# Patient Record
Sex: MALE | Race: WHITE | NOT HISPANIC OR LATINO | Employment: STUDENT | URBAN - METROPOLITAN AREA
[De-identification: names, ages, dates, MRNs, and addresses within clinical notes are randomized per-mention and may not be internally consistent; named-entity substitution may affect disease eponyms.]

---
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
## 2022-02-27 ENCOUNTER — OFFICE VISIT (OUTPATIENT)
Dept: URGENT CARE | Facility: CLINIC | Age: 22
End: 2022-02-27
Payer: MEDICAID

## 2022-02-27 VITALS
TEMPERATURE: 98 F | BODY MASS INDEX: 17.5 KG/M2 | OXYGEN SATURATION: 98 % | HEIGHT: 72 IN | RESPIRATION RATE: 18 BRPM | SYSTOLIC BLOOD PRESSURE: 112 MMHG | HEART RATE: 110 BPM | DIASTOLIC BLOOD PRESSURE: 71 MMHG

## 2022-02-27 DIAGNOSIS — J40 BRONCHITIS: Primary | ICD-10-CM

## 2022-02-27 DIAGNOSIS — H65.93 BILATERAL SEROUS OTITIS MEDIA, UNSPECIFIED CHRONICITY: ICD-10-CM

## 2022-02-27 DIAGNOSIS — R05.9 COUGH: ICD-10-CM

## 2022-02-27 DIAGNOSIS — R06.2 WHEEZE: ICD-10-CM

## 2022-02-27 DIAGNOSIS — R11.10 VOMITING, INTRACTABILITY OF VOMITING NOT SPECIFIED, PRESENCE OF NAUSEA NOT SPECIFIED, UNSPECIFIED VOMITING TYPE: ICD-10-CM

## 2022-02-27 DIAGNOSIS — R50.9 FEVER IN ADULT: ICD-10-CM

## 2022-02-27 LAB
CTP QC/QA: YES
SARS-COV-2 RDRP RESP QL NAA+PROBE: NEGATIVE

## 2022-02-27 PROCEDURE — U0002 COVID-19 LAB TEST NON-CDC: HCPCS | Mod: QW,S$GLB,, | Performed by: NURSE PRACTITIONER

## 2022-02-27 PROCEDURE — 71046 XR CHEST PA AND LATERAL: ICD-10-PCS | Mod: FY,S$GLB,, | Performed by: RADIOLOGY

## 2022-02-27 PROCEDURE — 1159F MED LIST DOCD IN RCRD: CPT | Mod: CPTII,S$GLB,, | Performed by: NURSE PRACTITIONER

## 2022-02-27 PROCEDURE — 71046 X-RAY EXAM CHEST 2 VIEWS: CPT | Mod: FY,S$GLB,, | Performed by: RADIOLOGY

## 2022-02-27 PROCEDURE — 3008F BODY MASS INDEX DOCD: CPT | Mod: CPTII,S$GLB,, | Performed by: NURSE PRACTITIONER

## 2022-02-27 PROCEDURE — 1160F RVW MEDS BY RX/DR IN RCRD: CPT | Mod: CPTII,S$GLB,, | Performed by: NURSE PRACTITIONER

## 2022-02-27 PROCEDURE — 3008F PR BODY MASS INDEX (BMI) DOCUMENTED: ICD-10-PCS | Mod: CPTII,S$GLB,, | Performed by: NURSE PRACTITIONER

## 2022-02-27 PROCEDURE — 3074F SYST BP LT 130 MM HG: CPT | Mod: CPTII,S$GLB,, | Performed by: NURSE PRACTITIONER

## 2022-02-27 PROCEDURE — 99203 OFFICE O/P NEW LOW 30 MIN: CPT | Mod: S$GLB,CS,, | Performed by: NURSE PRACTITIONER

## 2022-02-27 PROCEDURE — 3074F PR MOST RECENT SYSTOLIC BLOOD PRESSURE < 130 MM HG: ICD-10-PCS | Mod: CPTII,S$GLB,, | Performed by: NURSE PRACTITIONER

## 2022-02-27 PROCEDURE — 1160F PR REVIEW ALL MEDS BY PRESCRIBER/CLIN PHARMACIST DOCUMENTED: ICD-10-PCS | Mod: CPTII,S$GLB,, | Performed by: NURSE PRACTITIONER

## 2022-02-27 PROCEDURE — 1159F PR MEDICATION LIST DOCUMENTED IN MEDICAL RECORD: ICD-10-PCS | Mod: CPTII,S$GLB,, | Performed by: NURSE PRACTITIONER

## 2022-02-27 PROCEDURE — 99203 PR OFFICE/OUTPT VISIT, NEW, LEVL III, 30-44 MIN: ICD-10-PCS | Mod: S$GLB,CS,, | Performed by: NURSE PRACTITIONER

## 2022-02-27 PROCEDURE — U0002: ICD-10-PCS | Mod: QW,S$GLB,, | Performed by: NURSE PRACTITIONER

## 2022-02-27 PROCEDURE — 3078F DIAST BP <80 MM HG: CPT | Mod: CPTII,S$GLB,, | Performed by: NURSE PRACTITIONER

## 2022-02-27 PROCEDURE — 3078F PR MOST RECENT DIASTOLIC BLOOD PRESSURE < 80 MM HG: ICD-10-PCS | Mod: CPTII,S$GLB,, | Performed by: NURSE PRACTITIONER

## 2022-02-27 RX ORDER — PREDNISONE 20 MG/1
40 TABLET ORAL DAILY
Qty: 10 TABLET | Refills: 0 | Status: SHIPPED | OUTPATIENT
Start: 2022-02-27 | End: 2022-03-04

## 2022-02-27 RX ORDER — IBUPROFEN 400 MG/1
400 TABLET ORAL EVERY 6 HOURS PRN
Qty: 30 TABLET | Refills: 0 | OUTPATIENT
Start: 2022-02-27 | End: 2022-09-07

## 2022-02-27 RX ORDER — BENZONATATE 100 MG/1
100 CAPSULE ORAL 3 TIMES DAILY PRN
Qty: 30 CAPSULE | Refills: 0 | Status: SHIPPED | OUTPATIENT
Start: 2022-02-27 | End: 2022-03-09

## 2022-02-27 RX ORDER — ALBUTEROL SULFATE 90 UG/1
2 AEROSOL, METERED RESPIRATORY (INHALATION) EVERY 4 HOURS PRN
Qty: 18 G | Refills: 0 | Status: SHIPPED | OUTPATIENT
Start: 2022-02-27

## 2022-02-27 RX ORDER — AMOXICILLIN AND CLAVULANATE POTASSIUM 875; 125 MG/1; MG/1
1 TABLET, FILM COATED ORAL EVERY 12 HOURS
Qty: 20 TABLET | Refills: 0 | Status: SHIPPED | OUTPATIENT
Start: 2022-02-27 | End: 2022-03-09

## 2022-02-27 RX ORDER — ONDANSETRON 8 MG/1
8 TABLET, ORALLY DISINTEGRATING ORAL EVERY 8 HOURS PRN
Qty: 12 TABLET | Refills: 0 | Status: SHIPPED | OUTPATIENT
Start: 2022-02-27

## 2022-02-27 NOTE — PROGRESS NOTES
Subjective:       Patient ID: Shekhar Snyder is a 21 y.o. male.    Vitals:  height is 6' (1.829 m). His oral temperature is 98.2 °F (36.8 °C). His blood pressure is 112/71 and his pulse is 110. His respiration is 18 and oxygen saturation is 98%.     Chief Complaint: URI    20 y/o male here with bilateral ear pain, sinus pressure, productive cough, fever (tactile), and chest tightness x7-10 days.  He has also been feeling fatigued with malaise.  He has had some episodes of vomiting at over the last few nights. Reports he woke up on bathroom floor twice, not realizing he fell asleep on the floor.    Pt appears stable now, no apparent distress. Well hydrated and non toxic appearance.     URI   This is a new problem. Episode onset: 5 days ago  Maximum temperature: unmeasured. Associated symptoms include congestion, coughing, ear pain, headaches (10/10), sinus pain and vomiting (after eating). Pertinent negatives include no nausea or sore throat. Treatments tried: dayquil, OTC sinus medication, ibuprofen.       Constitution: Positive for fatigue and fever.   HENT: Positive for ear pain, congestion, sinus pain and sinus pressure. Negative for sore throat.    Respiratory: Positive for cough and sputum production.    Gastrointestinal: Positive for vomiting (after eating). Negative for nausea.   Neurological: Positive for headaches (10/10).       Objective:      Physical Exam   Constitutional: He is oriented to person, place, and time. He appears well-developed. He is cooperative.  Non-toxic appearance. He does not appear ill. No distress.   HENT:   Head: Normocephalic and atraumatic.   Ears:   Right Ear: Hearing, external ear and ear canal normal. Tympanic membrane is bulging (dull).   Left Ear: Hearing, external ear and ear canal normal. Tympanic membrane is bulging (dull).   Nose: No mucosal edema (has nasal congestion), rhinorrhea or nasal deformity. No epistaxis. Right sinus exhibits maxillary sinus tenderness and  frontal sinus tenderness. Left sinus exhibits maxillary sinus tenderness and frontal sinus tenderness.   Mouth/Throat: Uvula is midline and mucous membranes are normal. No trismus in the jaw. Normal dentition. No uvula swelling. Posterior oropharyngeal erythema present. No oropharyngeal exudate or posterior oropharyngeal edema.   Eyes: Conjunctivae and lids are normal. No scleral icterus.   Neck: Trachea normal and phonation normal. Neck supple. No edema present. No erythema present. No neck rigidity present.   Cardiovascular: Normal rate, regular rhythm, normal heart sounds and normal pulses.   Pulmonary/Chest: Effort normal. No accessory muscle usage. No tachypnea and no bradypnea. No respiratory distress. He has no decreased breath sounds. He has wheezes. He has rhonchi.   Abdominal: Normal appearance. Soft. Bowel sounds are increased. flat abdomenThere is no abdominal tenderness. There is no rebound and no guarding.   Musculoskeletal: Normal range of motion.         General: No deformity. Normal range of motion.   Neurological: He is alert and oriented to person, place, and time. He exhibits normal muscle tone. Coordination normal.   Skin: Skin is warm, dry, intact, not diaphoretic and not pale.   Psychiatric: His speech is normal and behavior is normal. Judgment and thought content normal.   Nursing note and vitals reviewed.        Assessment:       1. Bronchitis    2. Wheeze    3. Fever in adult    4. Vomiting, intractability of vomiting not specified, presence of nausea not specified, unspecified vomiting type    5. Cough    6. Bilateral serous otitis media, unspecified chronicity              Results for orders placed or performed in visit on 02/27/22   POCT COVID-19 Rapid Screening   Result Value Ref Range    POC Rapid COVID Negative Negative     Acceptable Yes        XR CHEST PA AND LATERAL    Result Date: 2/27/2022  EXAMINATION: XR CHEST PA AND LATERAL CLINICAL HISTORY: fever, cough,  wheeze; Cough, unspecified TECHNIQUE: PA and lateral views of the chest were performed. COMPARISON: October 2016. FINDINGS: Cardiac silhouette is normal in size.  Lungs are symmetrically expanded.  No evidence of focal consolidative process, pneumothorax, or significant pleural effusion.  No acute osseous abnormality identified.     No acute cardiopulmonary process identified. Electronically signed by: Boone Leger MD Date:    02/27/2022 Time:    17:06  Plan:         Bronchitis  -     POCT COVID-19 Rapid Screening  -     XR CHEST PA AND LATERAL; Future; Expected date: 02/27/2022  -     amoxicillin-clavulanate 875-125mg (AUGMENTIN) 875-125 mg per tablet; Take 1 tablet by mouth every 12 (twelve) hours. for 10 days  Dispense: 20 tablet; Refill: 0  -     benzonatate (TESSALON) 100 MG capsule; Take 1 capsule (100 mg total) by mouth 3 (three) times daily as needed for Cough.  Dispense: 30 capsule; Refill: 0    Wheeze  -     XR CHEST PA AND LATERAL; Future; Expected date: 02/27/2022  -     predniSONE (DELTASONE) 20 MG tablet; Take 2 tablets (40 mg total) by mouth once daily. for 5 days  Dispense: 10 tablet; Refill: 0  -     albuterol (VENTOLIN HFA) 90 mcg/actuation inhaler; Inhale 2 puffs into the lungs every 4 (four) hours as needed for Wheezing or Shortness of Breath.  Dispense: 18 g; Refill: 0    Fever in adult  -     XR CHEST PA AND LATERAL; Future; Expected date: 02/27/2022  -     ibuprofen (ADVIL,MOTRIN) 400 MG tablet; Take 1 tablet (400 mg total) by mouth every 6 (six) hours as needed for Other or Temperature greater than (100.04).  Dispense: 30 tablet; Refill: 0    Vomiting, intractability of vomiting not specified, presence of nausea not specified, unspecified vomiting type  -     ondansetron (ZOFRAN-ODT) 8 MG TbDL; Take 1 tablet (8 mg total) by mouth every 8 (eight) hours as needed (vomiting).  Dispense: 12 tablet; Refill: 0    Cough  -     benzonatate (TESSALON) 100 MG capsule; Take 1 capsule (100 mg total) by  mouth 3 (three) times daily as needed for Cough.  Dispense: 30 capsule; Refill: 0    Bilateral serous otitis media, unspecified chronicity  -     amoxicillin-clavulanate 875-125mg (AUGMENTIN) 875-125 mg per tablet; Take 1 tablet by mouth every 12 (twelve) hours. for 10 days  Dispense: 20 tablet; Refill: 0         Patient Instructions   Oral fluids  Rest  Blow nose often  Cough therapy

## 2022-02-28 NOTE — TELEPHONE ENCOUNTER
----- Message from Bill Taylor MD sent at 6/20/2017  3:53 PM CDT -----  Contact: Pharmacy [facility]  Please call mother and let her know. Thank you  ----- Message -----  From: Cathie Diamond MA  Sent: 6/20/2017   1:19 PM  To: Bill Taylor MD    [phone] [facility]    Focalin extended release. Brand name-- ins will not pay for     Subjective:      Patient ID: Ervin Cyr is a 80 y.o. male    Hyperlipidemia  This is a chronic problem. The current episode started more than 1 year ago. The problem is controlled. Associated symptoms include chest pain. Chest Pain   Pertinent negatives include no fever, vomiting or weakness. His past medical history is significant for hyperlipidemia. Rectal Bleeding   Associated symptoms include chest pain. Pertinent negatives include no fever, no diarrhea, no vomiting and no rash. here for physical    Here with several episodes of left sided chest pain with tingling feeling sensation in that area. Seems to have resolved currently. Noted some sob associated with this at times. .    Also noted some rectal bleeding on occasion after exercising over the last month or so. No rectal pain. No diarrhea. Review of Systems   Constitutional: Negative for chills and fever. Cardiovascular: Positive for chest pain. Gastrointestinal: Positive for hematochezia. Negative for diarrhea and vomiting. Genitourinary: Negative for dysuria. Skin: Negative for rash. Neurological: Negative for weakness.      Reviewed allergy, medical, social, surgical, family and med list changes and updated   Files     Social History     Socioeconomic History    Marital status:      Spouse name: None    Number of children: None    Years of education: None    Highest education level: None   Occupational History    None   Tobacco Use    Smoking status: Former Smoker    Smokeless tobacco: Never Used   Vaping Use    Vaping Use: None   Substance and Sexual Activity    Alcohol use: Not Currently    Drug use: Not Currently    Sexual activity: Yes     Partners: Female   Other Topics Concern    None   Social History Narrative    None     Social Determinants of Health     Financial Resource Strain:     Difficulty of Paying Living Expenses: Not on file   Food Insecurity:     Worried About Running Out of [employer] [place] in the Last Year: Not on file    Ran Out of Food in the Last Year: Not on file   Transportation Needs:     Lack of Transportation (Medical): Not on file    Lack of Transportation (Non-Medical): Not on file   Physical Activity:     Days of Exercise per Week: Not on file    Minutes of Exercise per Session: Not on file   Stress:     Feeling of Stress : Not on file   Social Connections:     Frequency of Communication with Friends and Family: Not on file    Frequency of Social Gatherings with Friends and Family: Not on file    Attends Anabaptist Services: Not on file    Active Member of [address] [place] [address] [employer] or Organizations: Not on file    Attends Club or Organization Meetings: Not on file    Marital Status: Not on file   Intimate Partner Violence:     Fear of Current or Ex-Partner: Not on file    Emotionally Abused: Not on file    Physically Abused: Not on file    Sexually Abused: Not on file   Housing Stability:     Unable to Pay for Housing in the Last Year: Not on file    Number of [place] in the Last Year: Not on file    Unstable Housing in the Last Year: Not on file     Current Outpatient Medications   Medication Sig Dispense Refill    rivaroxaban (XARELTO) 20 MG TABS tablet Take 1 tablet by mouth daily (with breakfast) (Patient taking differently: Take 10 mg by mouth daily (with breakfast) ) 90 tablet 3     No current facility-administered medications for this visit. Family History   Problem Relation Age of Onset    Colon Cancer Maternal Grandmother    Hemant Riddles Cancer Mother     Other Father     Heart Attack Father     Heart Disease Father     High Blood Pressure Father     Cancer Brother      Past Medical History:   Diagnosis Date    History of DVT (deep vein thrombosis) 6/26/2020    History of pulmonary embolism 6/26/2020     Objective:   /84   Pulse 80   Resp 16   Ht 6' 1\" (1.854 m)   SpO2 97%   BMI 33.78 kg/m²     Physical Exam      PHYSICAL EXAMINATION:  Vital signs as recorded. GENERAL:  Alert, oriented male, well nourished, well developed with no acute distress with normal affect. HEENT:   Pupils equal and reactive to light and accommodation. Nares negative. Pharynx without erythema or exudate. TMs are clear. Sclerae and conjunctiva are also clear. No masses visible. NECK:  supple without masses, no adenopathy or bruits noted. Thyroid without any enlargements or nodularity. HEART:  RRR without murmurs, rubs or gallops. LUNGS:  clear to auscultation with normal breath sounds. No acute distress noted. Negative for rhonchi or wheezes. ABDOMEN:  soft times 4, nontender. Bowel sounds positive times 4. No guarding or rebound or bruits. No hepatosplenomegaly. No masses. GENITAL EXAM:  normal penis with no abnormalities noted. No hernias or testicular masses appreciated. EXTREMITIES:   Trace edema of both legs  No joint deformity or swelling noted either. SKIN: so suspicious skin lesions or masses noted. NEURO: No focal deficits noted at all. RECTAL;   Prostate size normal.   No masses. No prostate nodule  No hemorrhoids or fissures    PULSES:  Radial 2+ and equal while P.T 1+ and equal.          Assessment:          Diagnosis Orders   1. Chest pain, unspecified type  EKG 12 Lead    Scot Calvert MD, Cardiology, [place]   2. Rectal bleeding  Shania Maza MD, Gastroenterology, [place]   3. Hypertriglyceridemia     4. Special screening for malignant neoplasm of prostate  POCT Urinalysis no Micro   5. Other chronic pulmonary embolism without acute cor pulmonale (HCC)     6.  At high risk for falls           Plan:      Copd stable -on no meds   Orders Placed This Encounter   Procedures    INFLUENZA, QUADV, ADJUVANTED, 65 YRS =, IM, PF, PREFILL SYR, 0.5ML (FLUAD)    Lipid Panel     Standing Status:   Future     Standing Expiration Date:   2/28/2023     Order Specific Question:   Is Patient Fasting?/# of Hours     Answer:   9    Comprehensive Metabolic Panel Standing Status:   Future     Standing Expiration Date:   2/28/2023    CBC with Auto Differential     Standing Status:   Future     Standing Expiration Date:   8/28/2022    PSA Screening     Standing Status:   Future     Standing Expiration Date:   2/28/2023   Mercedes Chung MD, Cardiology, [place]     Referral Priority:   Routine     Referral Type:   Eval and Treat     Referral Reason:   Specialty Services Required     Referred to Provider:   Keren Hernández MD     Requested Specialty:   Cardiology     Number of Visits Requested:   Jennifer Thompson MD, Gastroenterology, [place]     Referral Priority:   Routine     Referral Type:   Eval and Treat     Referral Reason:   Specialty Services Required     Referred to Provider:   Randy Zamora MD     Requested Specialty:   Gastroenterology     Number of Visits Requested:   1    POCT Urinalysis no Micro    EKG 12 Lead     Order Specific Question:   Reason for Exam?     Answer:   Chest pain   f/u after all testing done
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
## 2022-09-07 ENCOUNTER — HOSPITAL ENCOUNTER (EMERGENCY)
Facility: HOSPITAL | Age: 22
Discharge: HOME | End: 2022-09-07
Attending: EMERGENCY MEDICINE
Payer: MEDICAID

## 2022-09-07 VITALS
RESPIRATION RATE: 16 BRPM | SYSTOLIC BLOOD PRESSURE: 132 MMHG | WEIGHT: 140 LBS | DIASTOLIC BLOOD PRESSURE: 82 MMHG | TEMPERATURE: 98 F | OXYGEN SATURATION: 100 % | BODY MASS INDEX: 18.99 KG/M2 | HEART RATE: 97 BPM

## 2022-09-07 DIAGNOSIS — M89.8X1 CLAVICLE PAIN: ICD-10-CM

## 2022-09-07 DIAGNOSIS — S42.002A CLOSED DISPLACED FRACTURE OF LEFT CLAVICLE, UNSPECIFIED PART OF CLAVICLE, INITIAL ENCOUNTER: Primary | ICD-10-CM

## 2022-09-07 DIAGNOSIS — S49.90XA SHOULDER INJURY: ICD-10-CM

## 2022-09-07 PROCEDURE — 99284 EMERGENCY DEPT VISIT MOD MDM: CPT

## 2022-09-07 PROCEDURE — 25000003 PHARM REV CODE 250: Performed by: NURSE PRACTITIONER

## 2022-09-07 RX ORDER — IBUPROFEN 800 MG/1
800 TABLET ORAL EVERY 6 HOURS PRN
Qty: 20 TABLET | Refills: 0 | Status: ON HOLD | OUTPATIENT
Start: 2022-09-07 | End: 2022-09-20 | Stop reason: HOSPADM

## 2022-09-07 RX ORDER — METHOCARBAMOL 750 MG/1
750 TABLET, FILM COATED ORAL 3 TIMES DAILY
Qty: 15 TABLET | Refills: 0 | Status: SHIPPED | OUTPATIENT
Start: 2022-09-07 | End: 2022-09-12

## 2022-09-07 RX ORDER — HYDROCODONE BITARTRATE AND ACETAMINOPHEN 5; 325 MG/1; MG/1
1 TABLET ORAL
Status: COMPLETED | OUTPATIENT
Start: 2022-09-07 | End: 2022-09-07

## 2022-09-07 RX ADMIN — HYDROCODONE BITARTRATE AND ACETAMINOPHEN 1 TABLET: 5; 325 TABLET ORAL at 01:09
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
## 2022-09-07 NOTE — Clinical Note
"Shekhar Elaine" Lillian was seen and treated in our emergency department on 9/7/2022.  He may return to work on 09/09/2022.       If you have any questions or concerns, please don't hesitate to call.      Ayah Aiken NP"

## 2022-09-07 NOTE — DISCHARGE INSTRUCTIONS

## 2022-09-14 ENCOUNTER — TELEPHONE (OUTPATIENT)
Dept: ORTHOPEDICS | Facility: CLINIC | Age: 22
End: 2022-09-14

## 2022-09-14 ENCOUNTER — HOSPITAL ENCOUNTER (OUTPATIENT)
Dept: RADIOLOGY | Facility: HOSPITAL | Age: 22
Discharge: HOME | End: 2022-09-14
Attending: ORTHOPAEDIC SURGERY
Payer: MEDICAID

## 2022-09-14 ENCOUNTER — OFFICE VISIT (OUTPATIENT)
Dept: ORTHOPEDICS | Facility: CLINIC | Age: 22
End: 2022-09-14
Payer: MEDICAID

## 2022-09-14 VITALS
DIASTOLIC BLOOD PRESSURE: 79 MMHG | HEIGHT: 72 IN | WEIGHT: 145.5 LBS | SYSTOLIC BLOOD PRESSURE: 125 MMHG | HEART RATE: 64 BPM | BODY MASS INDEX: 19.71 KG/M2

## 2022-09-14 DIAGNOSIS — Z01.818 PRE-OP TESTING: ICD-10-CM

## 2022-09-14 DIAGNOSIS — Z01.818 PREOP EXAMINATION: ICD-10-CM

## 2022-09-14 DIAGNOSIS — Z72.0 NICOTINE ABUSE: ICD-10-CM

## 2022-09-14 DIAGNOSIS — S42.022A CLOSED DISPLACED FRACTURE OF SHAFT OF LEFT CLAVICLE, INITIAL ENCOUNTER: Primary | ICD-10-CM

## 2022-09-14 PROCEDURE — 3074F SYST BP LT 130 MM HG: CPT | Mod: CPTII,,, | Performed by: ORTHOPAEDIC SURGERY

## 2022-09-14 PROCEDURE — 99215 OFFICE O/P EST HI 40 MIN: CPT | Mod: PBBFAC,PN,25 | Performed by: ORTHOPAEDIC SURGERY

## 2022-09-14 PROCEDURE — 71045 X-RAY EXAM CHEST 1 VIEW: CPT | Mod: 26,,, | Performed by: RADIOLOGY

## 2022-09-14 PROCEDURE — 1159F MED LIST DOCD IN RCRD: CPT | Mod: CPTII,,, | Performed by: ORTHOPAEDIC SURGERY

## 2022-09-14 PROCEDURE — 99204 OFFICE O/P NEW MOD 45 MIN: CPT | Mod: S$PBB,,, | Performed by: ORTHOPAEDIC SURGERY

## 2022-09-14 PROCEDURE — 71045 X-RAY EXAM CHEST 1 VIEW: CPT | Mod: TC,FY

## 2022-09-14 PROCEDURE — 3074F PR MOST RECENT SYSTOLIC BLOOD PRESSURE < 130 MM HG: ICD-10-PCS | Mod: CPTII,,, | Performed by: ORTHOPAEDIC SURGERY

## 2022-09-14 PROCEDURE — 3008F PR BODY MASS INDEX (BMI) DOCUMENTED: ICD-10-PCS | Mod: CPTII,,, | Performed by: ORTHOPAEDIC SURGERY

## 2022-09-14 PROCEDURE — 71045 XR CHEST 1 VIEW PRE-OP: ICD-10-PCS | Mod: 26,,, | Performed by: RADIOLOGY

## 2022-09-14 PROCEDURE — 99999 PR PBB SHADOW E&M-EST. PATIENT-LVL V: ICD-10-PCS | Mod: PBBFAC,,, | Performed by: ORTHOPAEDIC SURGERY

## 2022-09-14 PROCEDURE — 1159F PR MEDICATION LIST DOCUMENTED IN MEDICAL RECORD: ICD-10-PCS | Mod: CPTII,,, | Performed by: ORTHOPAEDIC SURGERY

## 2022-09-14 PROCEDURE — 99999 PR PBB SHADOW E&M-EST. PATIENT-LVL V: CPT | Mod: PBBFAC,,, | Performed by: ORTHOPAEDIC SURGERY

## 2022-09-14 PROCEDURE — 3078F PR MOST RECENT DIASTOLIC BLOOD PRESSURE < 80 MM HG: ICD-10-PCS | Mod: CPTII,,, | Performed by: ORTHOPAEDIC SURGERY

## 2022-09-14 PROCEDURE — 3078F DIAST BP <80 MM HG: CPT | Mod: CPTII,,, | Performed by: ORTHOPAEDIC SURGERY

## 2022-09-14 PROCEDURE — 99204 PR OFFICE/OUTPT VISIT, NEW, LEVL IV, 45-59 MIN: ICD-10-PCS | Mod: S$PBB,,, | Performed by: ORTHOPAEDIC SURGERY

## 2022-09-14 PROCEDURE — 3008F BODY MASS INDEX DOCD: CPT | Mod: CPTII,,, | Performed by: ORTHOPAEDIC SURGERY

## 2022-09-14 RX ORDER — ACETAMINOPHEN 325 MG/1
325 TABLET ORAL EVERY 6 HOURS PRN
COMMUNITY
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
## 2022-09-16 ENCOUNTER — LAB VISIT (OUTPATIENT)
Dept: FAMILY MEDICINE | Facility: CLINIC | Age: 22
End: 2022-09-16
Payer: MEDICAID

## 2022-09-16 DIAGNOSIS — Z01.818 PRE-OP TESTING: ICD-10-CM

## 2022-09-16 LAB — SARS-COV-2 RNA RESP QL NAA+PROBE: NOT DETECTED

## 2022-09-16 PROCEDURE — U0005 INFEC AGEN DETEC AMPLI PROBE: HCPCS | Performed by: ORTHOPAEDIC SURGERY

## 2022-09-16 PROCEDURE — U0003 INFECTIOUS AGENT DETECTION BY NUCLEIC ACID (DNA OR RNA); SEVERE ACUTE RESPIRATORY SYNDROME CORONAVIRUS 2 (SARS-COV-2) (CORONAVIRUS DISEASE [COVID-19]), AMPLIFIED PROBE TECHNIQUE, MAKING USE OF HIGH THROUGHPUT TECHNOLOGIES AS DESCRIBED BY CMS-2020-01-R: HCPCS | Performed by: ORTHOPAEDIC SURGERY

## 2022-09-20 ENCOUNTER — ANESTHESIA (OUTPATIENT)
Dept: SURGERY | Facility: HOSPITAL | Age: 22
End: 2022-09-20
Payer: MEDICAID

## 2022-09-20 ENCOUNTER — HOSPITAL ENCOUNTER (OUTPATIENT)
Facility: HOSPITAL | Age: 22
Discharge: HOME | End: 2022-09-20
Attending: ORTHOPAEDIC SURGERY | Admitting: ORTHOPAEDIC SURGERY
Payer: MEDICAID

## 2022-09-20 ENCOUNTER — ANESTHESIA EVENT (OUTPATIENT)
Dept: SURGERY | Facility: HOSPITAL | Age: 22
End: 2022-09-20
Payer: MEDICAID

## 2022-09-20 VITALS
HEART RATE: 73 BPM | RESPIRATION RATE: 18 BRPM | WEIGHT: 145 LBS | DIASTOLIC BLOOD PRESSURE: 95 MMHG | BODY MASS INDEX: 18.61 KG/M2 | TEMPERATURE: 98 F | OXYGEN SATURATION: 95 % | HEIGHT: 74 IN | SYSTOLIC BLOOD PRESSURE: 125 MMHG

## 2022-09-20 DIAGNOSIS — S42.022A CLOSED DISPLACED FRACTURE OF SHAFT OF LEFT CLAVICLE, INITIAL ENCOUNTER: Primary | ICD-10-CM

## 2022-09-20 DIAGNOSIS — S42.002A FRACTURE OF UNSPECIFIED PART OF LEFT CLAVICLE, INITIAL ENCOUNTER FOR CLOSED FRACTURE: ICD-10-CM

## 2022-09-20 LAB
CTP QC/QA: YES
CTP QC/QA: YES
SARS-COV-2 AG RESP QL IA.RAPID: NEGATIVE
SARS-COV-2 AG RESP QL IA.RAPID: NEGATIVE

## 2022-09-20 PROCEDURE — 36000710: Performed by: ORTHOPAEDIC SURGERY

## 2022-09-20 PROCEDURE — 00450 ANES PX CLAV&SCAPULA NOS: CPT | Performed by: ORTHOPAEDIC SURGERY

## 2022-09-20 PROCEDURE — C1713 ANCHOR/SCREW BN/BN,TIS/BN: HCPCS | Performed by: ORTHOPAEDIC SURGERY

## 2022-09-20 PROCEDURE — 37000008 HC ANESTHESIA 1ST 15 MINUTES: Performed by: ORTHOPAEDIC SURGERY

## 2022-09-20 PROCEDURE — 25000003 PHARM REV CODE 250: Performed by: ORTHOPAEDIC SURGERY

## 2022-09-20 PROCEDURE — 37000009 HC ANESTHESIA EA ADD 15 MINS: Performed by: ORTHOPAEDIC SURGERY

## 2022-09-20 PROCEDURE — 36000711: Performed by: ORTHOPAEDIC SURGERY

## 2022-09-20 PROCEDURE — 71000033 HC RECOVERY, INTIAL HOUR: Performed by: ORTHOPAEDIC SURGERY

## 2022-09-20 PROCEDURE — 25000003 PHARM REV CODE 250: Performed by: STUDENT IN AN ORGANIZED HEALTH CARE EDUCATION/TRAINING PROGRAM

## 2022-09-20 PROCEDURE — 27201423 OPTIME MED/SURG SUP & DEVICES STERILE SUPPLY: Performed by: ORTHOPAEDIC SURGERY

## 2022-09-20 PROCEDURE — 63600175 PHARM REV CODE 636 W HCPCS: Performed by: NURSE ANESTHETIST, CERTIFIED REGISTERED

## 2022-09-20 PROCEDURE — 71000039 HC RECOVERY, EACH ADD'L HOUR: Performed by: ORTHOPAEDIC SURGERY

## 2022-09-20 PROCEDURE — 63600175 PHARM REV CODE 636 W HCPCS: Performed by: ANESTHESIOLOGY

## 2022-09-20 PROCEDURE — 63600175 PHARM REV CODE 636 W HCPCS: Performed by: STUDENT IN AN ORGANIZED HEALTH CARE EDUCATION/TRAINING PROGRAM

## 2022-09-20 PROCEDURE — 71000015 HC POSTOP RECOV 1ST HR: Performed by: ORTHOPAEDIC SURGERY

## 2022-09-20 PROCEDURE — C9290 INJ, BUPIVACAINE LIPOSOME: HCPCS | Performed by: STUDENT IN AN ORGANIZED HEALTH CARE EDUCATION/TRAINING PROGRAM

## 2022-09-20 PROCEDURE — 23515 PR OPEN TREATMENT CLAVICULAR FRACTURE INTERNAL FX: ICD-10-PCS | Mod: LT,,, | Performed by: ORTHOPAEDIC SURGERY

## 2022-09-20 PROCEDURE — 25000003 PHARM REV CODE 250: Performed by: NURSE ANESTHETIST, CERTIFIED REGISTERED

## 2022-09-20 PROCEDURE — 23515 OPTX CLAVICULAR FX W/INT FIX: CPT | Mod: LT,,, | Performed by: ORTHOPAEDIC SURGERY

## 2022-09-20 DEVICE — SCREW BNE N LOK HEXLB 3.5X12: Type: IMPLANTABLE DEVICE | Site: CLAVICLE | Status: FUNCTIONAL

## 2022-09-20 RX ORDER — DEXAMETHASONE SODIUM PHOSPHATE 4 MG/ML
INJECTION, SOLUTION INTRA-ARTICULAR; INTRALESIONAL; INTRAMUSCULAR; INTRAVENOUS; SOFT TISSUE
Status: DISCONTINUED | OUTPATIENT
Start: 2022-09-20 | End: 2022-09-20

## 2022-09-20 RX ORDER — OXYCODONE HYDROCHLORIDE 5 MG/1
5 TABLET ORAL EVERY 6 HOURS PRN
Qty: 30 TABLET | Refills: 0 | Status: SHIPPED | OUTPATIENT
Start: 2022-09-20

## 2022-09-20 RX ORDER — PREGABALIN 75 MG/1
300 CAPSULE ORAL ONCE
Status: COMPLETED | OUTPATIENT
Start: 2022-09-20 | End: 2022-09-20

## 2022-09-20 RX ORDER — ACETAMINOPHEN 500 MG
1000 TABLET ORAL EVERY 8 HOURS
Qty: 84 TABLET | Refills: 0 | Status: SHIPPED | OUTPATIENT
Start: 2022-09-20 | End: 2022-10-04

## 2022-09-20 RX ORDER — PROPOFOL 10 MG/ML
VIAL (ML) INTRAVENOUS
Status: DISCONTINUED | OUTPATIENT
Start: 2022-09-20 | End: 2022-09-20

## 2022-09-20 RX ORDER — GABAPENTIN 300 MG/1
300 CAPSULE ORAL NIGHTLY
Qty: 14 CAPSULE | Refills: 0 | Status: SHIPPED | OUTPATIENT
Start: 2022-09-20 | End: 2022-10-10

## 2022-09-20 RX ORDER — SODIUM CHLORIDE 0.9 % (FLUSH) 0.9 %
10 SYRINGE (ML) INJECTION
Status: DISCONTINUED | OUTPATIENT
Start: 2022-09-20 | End: 2022-09-20 | Stop reason: HOSPADM

## 2022-09-20 RX ORDER — ONDANSETRON HYDROCHLORIDE 2 MG/ML
INJECTION, SOLUTION INTRAMUSCULAR; INTRAVENOUS
Status: DISCONTINUED | OUTPATIENT
Start: 2022-09-20 | End: 2022-09-20

## 2022-09-20 RX ORDER — ONDANSETRON 2 MG/ML
4 INJECTION INTRAMUSCULAR; INTRAVENOUS ONCE AS NEEDED
Status: DISCONTINUED | OUTPATIENT
Start: 2022-09-20 | End: 2022-09-20 | Stop reason: HOSPADM

## 2022-09-20 RX ORDER — ROCURONIUM BROMIDE 10 MG/ML
INJECTION, SOLUTION INTRAVENOUS
Status: DISCONTINUED | OUTPATIENT
Start: 2022-09-20 | End: 2022-09-20

## 2022-09-20 RX ORDER — SUCCINYLCHOLINE CHLORIDE 20 MG/ML
INJECTION INTRAMUSCULAR; INTRAVENOUS
Status: DISCONTINUED | OUTPATIENT
Start: 2022-09-20 | End: 2022-09-20

## 2022-09-20 RX ORDER — HYDROMORPHONE HYDROCHLORIDE 2 MG/ML
0.5 INJECTION, SOLUTION INTRAMUSCULAR; INTRAVENOUS; SUBCUTANEOUS EVERY 5 MIN PRN
Status: DISCONTINUED | OUTPATIENT
Start: 2022-09-20 | End: 2022-09-20 | Stop reason: HOSPADM

## 2022-09-20 RX ORDER — FENTANYL CITRATE 50 UG/ML
INJECTION, SOLUTION INTRAMUSCULAR; INTRAVENOUS
Status: DISCONTINUED | OUTPATIENT
Start: 2022-09-20 | End: 2022-09-20

## 2022-09-20 RX ORDER — MIDAZOLAM HYDROCHLORIDE 1 MG/ML
INJECTION, SOLUTION INTRAMUSCULAR; INTRAVENOUS
Status: DISCONTINUED | OUTPATIENT
Start: 2022-09-20 | End: 2022-09-20

## 2022-09-20 RX ORDER — CLINDAMYCIN PHOSPHATE 900 MG/50ML
900 INJECTION, SOLUTION INTRAVENOUS
Status: DISCONTINUED | OUTPATIENT
Start: 2022-09-20 | End: 2022-09-20 | Stop reason: HOSPADM

## 2022-09-20 RX ORDER — ACETAMINOPHEN 500 MG
1000 TABLET ORAL ONCE
Status: COMPLETED | OUTPATIENT
Start: 2022-09-20 | End: 2022-09-20

## 2022-09-20 RX ORDER — BUPIVACAINE HYDROCHLORIDE AND EPINEPHRINE 5; 5 MG/ML; UG/ML
INJECTION, SOLUTION EPIDURAL; INTRACAUDAL; PERINEURAL
Status: DISCONTINUED | OUTPATIENT
Start: 2022-09-20 | End: 2022-09-20 | Stop reason: HOSPADM

## 2022-09-20 RX ORDER — LIDOCAINE HCL/PF 100 MG/5ML
SYRINGE (ML) INTRAVENOUS
Status: DISCONTINUED | OUTPATIENT
Start: 2022-09-20 | End: 2022-09-20

## 2022-09-20 RX ORDER — NEOSTIGMINE METHYLSULFATE 1 MG/ML
INJECTION, SOLUTION INTRAVENOUS
Status: DISCONTINUED | OUTPATIENT
Start: 2022-09-20 | End: 2022-09-20

## 2022-09-20 RX ORDER — SODIUM CHLORIDE 9 MG/ML
INJECTION, SOLUTION INTRAVENOUS CONTINUOUS
Status: DISCONTINUED | OUTPATIENT
Start: 2022-09-20 | End: 2022-09-20 | Stop reason: HOSPADM

## 2022-09-20 RX ORDER — PHENYLEPHRINE HYDROCHLORIDE 10 MG/ML
INJECTION INTRAVENOUS
Status: DISCONTINUED | OUTPATIENT
Start: 2022-09-20 | End: 2022-09-20

## 2022-09-20 RX ORDER — ASPIRIN 81 MG/1
81 TABLET ORAL DAILY
Qty: 30 TABLET | Refills: 0 | Status: SHIPPED | OUTPATIENT
Start: 2022-09-20 | End: 2022-12-21

## 2022-09-20 RX ORDER — BUPIVACAINE HYDROCHLORIDE 2.5 MG/ML
INJECTION, SOLUTION EPIDURAL; INFILTRATION; INTRACAUDAL
Status: DISCONTINUED | OUTPATIENT
Start: 2022-09-20 | End: 2022-09-20

## 2022-09-20 RX ADMIN — DEXAMETHASONE SODIUM PHOSPHATE 8 MG: 4 INJECTION, SOLUTION INTRA-ARTICULAR; INTRALESIONAL; INTRAMUSCULAR; INTRAVENOUS; SOFT TISSUE at 12:09

## 2022-09-20 RX ADMIN — BUPIVACAINE 10 ML: 13.3 INJECTION, SUSPENSION, LIPOSOMAL INFILTRATION at 11:09

## 2022-09-20 RX ADMIN — HYDROMORPHONE HYDROCHLORIDE 0.5 MG: 2 INJECTION, SOLUTION INTRAMUSCULAR; INTRAVENOUS; SUBCUTANEOUS at 03:09

## 2022-09-20 RX ADMIN — NEOSTIGMINE METHYLSULFATE 4 MG: 1 INJECTION INTRAVENOUS at 01:09

## 2022-09-20 RX ADMIN — PHENYLEPHRINE HYDROCHLORIDE 0.25 MCG/KG/MIN: 10 INJECTION INTRAVENOUS at 12:09

## 2022-09-20 RX ADMIN — ONDANSETRON 8 MG: 2 INJECTION, SOLUTION INTRAMUSCULAR; INTRAVENOUS at 01:09

## 2022-09-20 RX ADMIN — FENTANYL CITRATE 150 MCG: 50 INJECTION, SOLUTION INTRAMUSCULAR; INTRAVENOUS at 11:09

## 2022-09-20 RX ADMIN — ACETAMINOPHEN 1000 MG: 500 TABLET ORAL at 09:09

## 2022-09-20 RX ADMIN — SUCCINYLCHOLINE CHLORIDE 120 MG: 20 INJECTION, SOLUTION INTRAMUSCULAR; INTRAVENOUS at 11:09

## 2022-09-20 RX ADMIN — PHENYLEPHRINE HYDROCHLORIDE 150 MCG: 10 INJECTION INTRAVENOUS at 12:09

## 2022-09-20 RX ADMIN — MIDAZOLAM 5 MG: 1 INJECTION INTRAMUSCULAR; INTRAVENOUS at 11:09

## 2022-09-20 RX ADMIN — PREGABALIN 300 MG: 75 CAPSULE ORAL at 09:09

## 2022-09-20 RX ADMIN — ROCURONIUM BROMIDE 45 MG: 10 INJECTION, SOLUTION INTRAVENOUS at 12:09

## 2022-09-20 RX ADMIN — PROPOFOL 40 MG: 10 INJECTION, EMULSION INTRAVENOUS at 01:09

## 2022-09-20 RX ADMIN — PHENYLEPHRINE HYDROCHLORIDE 100 MCG: 10 INJECTION INTRAVENOUS at 12:09

## 2022-09-20 RX ADMIN — ROCURONIUM BROMIDE 5 MG: 10 INJECTION, SOLUTION INTRAVENOUS at 11:09

## 2022-09-20 RX ADMIN — SODIUM CHLORIDE, SODIUM LACTATE, POTASSIUM CHLORIDE, AND CALCIUM CHLORIDE: .6; .31; .03; .02 INJECTION, SOLUTION INTRAVENOUS at 11:09

## 2022-09-20 RX ADMIN — BUPIVACAINE HYDROCHLORIDE 10 ML: 2.5 INJECTION, SOLUTION EPIDURAL; INFILTRATION; INTRACAUDAL; PERINEURAL at 11:09

## 2022-09-20 RX ADMIN — PROPOFOL 150 MG: 10 INJECTION, EMULSION INTRAVENOUS at 11:09

## 2022-09-20 RX ADMIN — CLINDAMYCIN PHOSPHATE 900 MG: 18 INJECTION, SOLUTION INTRAVENOUS at 12:09

## 2022-09-20 RX ADMIN — PHENYLEPHRINE HYDROCHLORIDE 200 MCG: 10 INJECTION INTRAVENOUS at 12:09

## 2022-09-20 RX ADMIN — LIDOCAINE HYDROCHLORIDE 80 MG: 20 INJECTION, SOLUTION INTRAVENOUS at 11:09

## 2022-09-20 RX ADMIN — GLYCOPYRROLATE 0.6 MG: 0.2 INJECTION, SOLUTION INTRAMUSCULAR; INTRAVITREAL at 01:09

## 2022-09-20 RX ADMIN — PHENYLEPHRINE HYDROCHLORIDE 150 MCG: 10 INJECTION INTRAVENOUS at 11:09

## 2022-09-20 NOTE — ANESTHESIA PROCEDURE NOTES
Peripheral Block    Patient location during procedure: pre-op   Block not for primary anesthetic.  Reason for block: at surgeon's request and post-op pain management   Post-op Pain Location: L shoulder   Start time: 9/20/2022 11:29 AM  Timeout: 9/20/2022 11:28 AM   End time: 9/20/2022 11:31 AM    Staffing  Authorizing Provider: Mikel Ramos MD  Performing Provider: Kacey Buenrostro MD    Preanesthetic Checklist  Completed: patient identified, IV checked, site marked, risks and benefits discussed, surgical consent, monitors and equipment checked, pre-op evaluation and timeout performed  Peripheral Block  Patient position: supine  Prep: ChloraPrep  Patient monitoring: heart rate, continuous pulse ox and frequent blood pressure checks  Block type: interscalene  Laterality: left  Injection technique: single shot  Needle  Needle type: Echogenic   Needle gauge: 21 G  Needle length: 2 in  Needle localization: ultrasound guidance     Assessment  Injection assessment: negative parasthesia, negative aspiration and local visualized surrounding nerve  Paresthesia pain: none  Heart rate change: no  Slow fractionated injection: yes  Pain Tolerance: comfortable throughout block and no complaints      Additional Notes  C5, C6, C7 nerve roots, carotid artery, internal jugular vein, anterior scalene, middle scalene, sternocleidomastoid, omohyoid mucle identified under ultrasound. Area cleaned with chloraprep prior to insertion of stimuplex needle. Prior to local injection, aspiration negative for heme. Posterior C5 and C6 nerve root coverage by local anesthetic visualized under ultrasound - tracked distally to see nerve with continuous local anesthetic coverage. No complaints of parasthesias or discomfort throughout block. Vitals monitored and stable throughout.

## 2022-09-20 NOTE — ANESTHESIA PROCEDURE NOTES
Intubation    Date/Time: 9/20/2022 11:55 AM  Performed by: Sasha Cao CRNA  Authorized by: Domitila Machado MD     Intubation:     Induction:  Intravenous    Intubated:  Postinduction    Mask Ventilation:  Easy mask    Attempts:  1    Attempted By:  Student (Fabi Moraes)    Method of Intubation:  Direct    Blade:  Neil 3    Laryngeal View Grade: Grade I - full view of cords      Difficult Airway Encountered?: No      Complications:  None    Airway Device:  Oral endotracheal tube    Airway Device Size:  7.5    Style/Cuff Inflation:  Cuffed (inflated to minimal occlusive pressure)    Tube secured:  23    Secured at:  The lips    Placement Verified By:  Capnometry    Complicating Factors:  None    Findings Post-Intubation:  BS equal bilateral and atraumatic/condition of teeth unchanged

## 2022-09-20 NOTE — BRIEF OP NOTE
[facility] - [facility] (Hospital)  Brief Operative Note    Surgery Date: 9/20/2022     Surgeon(s) and Role:     * Satya Reynoso IV, MD - Primary     * Saul Baldwin MD - Resident - Assisting        Pre-op Diagnosis:  Closed displaced fracture of shaft of left clavicle, initial encounter [S42.022A]    Post-op Diagnosis:  Post-Op Diagnosis Codes:     * Closed displaced fracture of shaft of left clavicle, initial encounter [S42.022A]    Procedure(s) (LRB):  ORIF, FRACTURE, CLAVICLE (Left)    Anesthesia: General    Operative Findings: see op note    Estimated Blood Loss: * No values recorded between 9/20/2022 12:25 PM and 9/20/2022  1:56 PM *         Specimens:   Specimen (24h ago, onward)      None              Discharge Note    OUTCOME: Patient tolerated treatment/procedure well without complication and is now ready for discharge.    DISPOSITION: Home or Self Care    FINAL DIAGNOSIS:  Closed displaced fracture of shaft of left clavicle    FOLLOWUP: In clinic    DISCHARGE INSTRUCTIONS:    Discharge Procedure Orders   Diet Adult Regular     Ice to affected area     Notify your health care provider if you experience any of the following:  temperature >100.4     Change dressing (specify)   Order Comments: Remove Aquacel dressing in 7 days after surgery, then cover if draining; otherwise leave open to air.    Keep wound dry until seen in follow up after surgery.     Weight bearing restrictions (specify):   Order Comments: ARACELI RENAE
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
## 2022-09-20 NOTE — PATIENT INSTRUCTIONS
Time Line After Clavicle ORIF    Day 1-2 after surgery   Out of bed as much as possible  Keep your operative arm in the sling  Do not lift anything with your operative arm    Day 3 after surgery   If bandage comes off, cover with waterproof bandage and keep incision dry    Day 10-14   Follow up with surgeon for suture or staple removal   Keep bandage in place until day 10 then remove and replace with dry sterile bandage if needed       Pain Medications  -You were given a nerve block in conjunction with your surgical procedure.  This nerve block will wear off after surgery.  When you begin feeling pain it is important for you to take your pain medication as directed.    You are prescribed the following medications; it is important to take the medications as prescribed.   -Acetaminophen 1000mg every 8 hours.  Start taking this medication as soon as you get home even if you don't have pain, then continue taking as directed.  -Gabapentin 300mg before bedtime.  Take your first dose the evening of surgery.  -Oxycodone 5mg every 6 hours AS NEEDED ONLY.  Take your first dose when you begin feeling knee pain when your nerve block wears off.  After this first dose, only take this medication if your pain is extreme.  We will not refill this medication after surgery because it is a Narcotic Pain medication.       -Aspirin 81mg daily.  Take this medication the night of surgery, then continue taking daily for 30 days to reduce risk of blood clot after surgery.

## 2022-09-20 NOTE — ANESTHESIA PREPROCEDURE EVALUATION
09/20/2022  Shekhar Snyder is a 21 y.o., male.      Pre-op Assessment     I have reviewed the Nursing Notes.    I have reviewed the Medications.     Review of Systems  Anesthesia Hx:  No problems with previous Anesthesia Denies Hx of Anesthetic complications  Denies Family Hx of Anesthesia complications.    Social:  Non-Smoker, No Alcohol Use    Hematology/Oncology:  Hematology Normal   Oncology Normal     EENT/Dental:EENT/Dental Normal   Cardiovascular:  Cardiovascular Normal Exercise tolerance: good     Pulmonary:  Pulmonary Normal    Renal/:  Renal/ Normal     Hepatic/GI:  Hepatic/GI Normal    Musculoskeletal:  Musculoskeletal Normal    Neurological:  Neurology Normal    Endocrine:  Endocrine Normal        Physical Exam  General: Well nourished    Airway:  Mallampati: II / II  Mouth Opening: Normal  TM Distance: Normal  Tongue: Normal  Neck ROM: Normal ROM    Dental:  Intact        Anesthesia Plan  Type of Anesthesia, risks & benefits discussed:    Anesthesia Type: Gen ETT  Intra-op Monitoring Plan: Standard ASA Monitors  Post Op Pain Control Plan: multimodal analgesia  Induction:  IV  Airway Plan: Direct, Post-Induction  Informed Consent: Informed consent signed with the Patient and all parties understand the risks and agree with anesthesia plan.  All questions answered.   ASA Score: 2    Ready For Surgery From Anesthesia Perspective.     .

## 2022-09-20 NOTE — ANESTHESIA POSTPROCEDURE EVALUATION
Anesthesia Post Evaluation    Patient: Shekhar Snyder    Procedure(s) Performed: Procedure(s) (LRB):  ORIF, FRACTURE, CLAVICLE (Left)    Final Anesthesia Type: MAC      Patient location during evaluation: PACU  Patient participation: Yes- Able to Participate  Level of consciousness: awake and alert  Post-procedure vital signs: reviewed and stable  Pain management: adequate  Airway patency: patent    PONV status at discharge: No PONV  Anesthetic complications: no      Cardiovascular status: blood pressure returned to baseline  Respiratory status: unassisted  Hydration status: euvolemic  Follow-up not needed.          Vitals Value Taken Time   /66 09/20/22 1425   Temp 37 °C (98.6 °F) 09/20/22 1405   Pulse 66 09/20/22 1428   Resp 15 09/20/22 1428   SpO2 95 % 09/20/22 1428   Vitals shown include unvalidated device data.      No case tracking events are documented in the log.      Pain/Isabela Score: Pain Rating Prior to Med Admin: 0 (9/20/2022  9:56 AM)  Isabela Score: 9 (9/20/2022  2:05 PM)
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
## 2022-10-03 ENCOUNTER — TELEPHONE (OUTPATIENT)
Dept: ORTHOPEDICS | Facility: CLINIC | Age: 22
End: 2022-10-03
Payer: MEDICAID

## 2022-10-03 DIAGNOSIS — G89.18 POST-OP PAIN: Primary | ICD-10-CM

## 2022-10-06 ENCOUNTER — HOSPITAL ENCOUNTER (OUTPATIENT)
Dept: RADIOLOGY | Facility: HOSPITAL | Age: 22
Discharge: HOME | End: 2022-10-06
Attending: ORTHOPAEDIC SURGERY
Payer: MEDICAID

## 2022-10-06 DIAGNOSIS — G89.18 POST-OP PAIN: ICD-10-CM

## 2022-10-06 PROCEDURE — 73000 X-RAY EXAM OF COLLAR BONE: CPT | Mod: 26,LT,, | Performed by: RADIOLOGY

## 2022-10-06 PROCEDURE — 73000 XR CLAVICLE LEFT: ICD-10-PCS | Mod: 26,LT,, | Performed by: RADIOLOGY

## 2022-10-06 PROCEDURE — 73000 X-RAY EXAM OF COLLAR BONE: CPT | Mod: TC,FY,LT

## 2022-10-10 ENCOUNTER — OFFICE VISIT (OUTPATIENT)
Dept: ORTHOPEDICS | Facility: CLINIC | Age: 22
End: 2022-10-10
Payer: MEDICAID

## 2022-10-10 VITALS
SYSTOLIC BLOOD PRESSURE: 121 MMHG | BODY MASS INDEX: 18.39 KG/M2 | WEIGHT: 143.31 LBS | HEART RATE: 86 BPM | DIASTOLIC BLOOD PRESSURE: 77 MMHG | HEIGHT: 74 IN

## 2022-10-10 DIAGNOSIS — S42.022A CLOSED DISPLACED FRACTURE OF SHAFT OF LEFT CLAVICLE, INITIAL ENCOUNTER: Primary | ICD-10-CM

## 2022-10-10 DIAGNOSIS — Z72.0 NICOTINE ABUSE: ICD-10-CM

## 2022-10-10 PROCEDURE — 1160F PR REVIEW ALL MEDS BY PRESCRIBER/CLIN PHARMACIST DOCUMENTED: ICD-10-PCS | Mod: CPTII,,, | Performed by: PHYSICIAN ASSISTANT

## 2022-10-10 PROCEDURE — 99999 PR PBB SHADOW E&M-EST. PATIENT-LVL III: CPT | Mod: PBBFAC,,, | Performed by: PHYSICIAN ASSISTANT

## 2022-10-10 PROCEDURE — 1159F PR MEDICATION LIST DOCUMENTED IN MEDICAL RECORD: ICD-10-PCS | Mod: CPTII,,, | Performed by: PHYSICIAN ASSISTANT

## 2022-10-10 PROCEDURE — 1159F MED LIST DOCD IN RCRD: CPT | Mod: CPTII,,, | Performed by: PHYSICIAN ASSISTANT

## 2022-10-10 PROCEDURE — 3078F PR MOST RECENT DIASTOLIC BLOOD PRESSURE < 80 MM HG: ICD-10-PCS | Mod: CPTII,,, | Performed by: PHYSICIAN ASSISTANT

## 2022-10-10 PROCEDURE — 3074F SYST BP LT 130 MM HG: CPT | Mod: CPTII,,, | Performed by: PHYSICIAN ASSISTANT

## 2022-10-10 PROCEDURE — 3008F BODY MASS INDEX DOCD: CPT | Mod: CPTII,,, | Performed by: PHYSICIAN ASSISTANT

## 2022-10-10 PROCEDURE — 99024 PR POST-OP FOLLOW-UP VISIT: ICD-10-PCS | Mod: ,,, | Performed by: PHYSICIAN ASSISTANT

## 2022-10-10 PROCEDURE — 99024 POSTOP FOLLOW-UP VISIT: CPT | Mod: ,,, | Performed by: PHYSICIAN ASSISTANT

## 2022-10-10 PROCEDURE — 3008F PR BODY MASS INDEX (BMI) DOCUMENTED: ICD-10-PCS | Mod: CPTII,,, | Performed by: PHYSICIAN ASSISTANT

## 2022-10-10 PROCEDURE — 3074F PR MOST RECENT SYSTOLIC BLOOD PRESSURE < 130 MM HG: ICD-10-PCS | Mod: CPTII,,, | Performed by: PHYSICIAN ASSISTANT

## 2022-10-10 PROCEDURE — 3078F DIAST BP <80 MM HG: CPT | Mod: CPTII,,, | Performed by: PHYSICIAN ASSISTANT

## 2022-10-10 PROCEDURE — 99213 OFFICE O/P EST LOW 20 MIN: CPT | Mod: PBBFAC,PN | Performed by: PHYSICIAN ASSISTANT

## 2022-10-10 PROCEDURE — 99999 PR PBB SHADOW E&M-EST. PATIENT-LVL III: ICD-10-PCS | Mod: PBBFAC,,, | Performed by: PHYSICIAN ASSISTANT

## 2022-10-10 PROCEDURE — 1160F RVW MEDS BY RX/DR IN RCRD: CPT | Mod: CPTII,,, | Performed by: PHYSICIAN ASSISTANT

## 2022-10-10 NOTE — PROGRESS NOTES
"  [facility], Orthopedics and Sports Medicine  [facility]    Shoulder Post-op Visit  10/10/2022     Diagnosis:  ***    Procedure:   (***) ***    Subjective:      Shekhar Snyder is a 22 y.o. male who is now {numbers 0-10:33242} {units:11} status post {l/r/bi:83623} shoulder surgery. {pain control:88976} The patient denies {problems:44273::"fever" , "wound drainage" , "increasing redness","pus","increasing pain","increasing swelling"}. Post op problems reported: {Problems:31540::"none"}.    ***     Objective:      Ortho/SPM Exam  General: {Exam; general:37175::"alert","appears stated age","cooperative"}   Sutures: {sutures:59343::"Sutures in place and will be removed today."}  Incision: {incision:11771::"healing well","no significant drainage","no dehiscence","no significant erythema"}  Tenderness: {wound tenderness:12262::"none"}  Forward Flexion: {degrees by 10:57161} degrees  External Rotation: {degrees by 10:25437} degrees  Elbow/Wrist/Hand: Full ROM  Neuro: Intact to light touch Ax/R/U/M  Vascular: CR<2s. Palpable radial pulse      Imaging:  ***      Assessment:       The patient is status post {l/r/bi:20970} shoulder surgery. There were no encounter diagnoses.  {condition:30932::"Doing well postoperatively."} {Blank Single:75622::"Referal to another provider (***)","Continued non-operative treatment","Operative treatment with ***","Further work-up","Continuation of post-op rehab course"} is recommended at this time. All of the patient's questions were answered.    ***     Plan:      {ppivovpostopplan:99760}       Maria De Jesus Folwer PA-C  Department of Orthopedic Surgery  [facility]  Office: [phone]  Website: [email or web address]        No orders of the defined types were placed in this encounter.       "

## 2022-10-10 NOTE — PROGRESS NOTES
[facility], Orthopedics and Sports Medicine  [facility]    Shoulder Post-op Visit  10/10/2022     Diagnosis:  Closed displaced fracture of shaft of left clavicle, initial encounter     Procedure: 9/20/22   Left Clavicle fracture ORIF     Subjective:      Shekhar Snyder is a 22 y.o. male who is now 3 weeks status post left shoulder surgery. The patient is not having any pain. The patient denies none, fever, wound drainage, increasing redness, pus, increasing pain, increasing swelling. Post op problems reported: none.    Patient missed appt last week. Thought he was coming for xrays only.  Not having any pain. Stopped smoking cigarettes but is continuing vaping.       Objective:      Ortho/SPM Exam  General: alert, appears stated age, and cooperative   Sutures: Dermabond in place.  Incision: healing well, no significant drainage, no dehiscence, no significant erythema  Tenderness: none  Forward Flexion: 40 degrees  External Rotation: 30 degrees  Elbow/Wrist/Hand: Full ROM  Neuro: Intact to light touch Ax/R/U/M  Vascular: CR<2s. Palpable radial pulse      Imaging:  X-Ray Clavicle Left  Narrative: EXAMINATION:  XR CLAVICLE LEFT    CLINICAL HISTORY:  Other acute postprocedural pain    TECHNIQUE:  Left clavicle radiograph.    COMPARISON:  Left clavicle radiograph dated 09/07/2022    FINDINGS:  Interval screw fixation of the left clavicle with improved anatomic alignment.  Left AC joint and glenohumeral articulations appear intact.  Impression: As above.    Electronically signed by: Kash Denny  Date:    10/06/2022  Time:    13:50    I have reviewed the above radiograph and agree with the findings stated by the radiologist.         Assessment:       The patient is status post left shoulder surgery. The primary encounter diagnosis was Closed displaced fracture of shaft of left clavicle, initial encounter. A diagnosis of Nicotine abuse was also pertinent to this visit.  Doing well  postoperatively. Continuation of post-op rehab course is recommended at this time. All of the patient's questions were answered.    Continue NWB LUE until 6 week PO. Continue brace use. Urged patient to stop vaping/ cigarette use.     Plan:      Tylenol 650mg TID PRN for pain.  Nonweightbearing on the operative extremity until 6 weeks after surgery.  Follow up at 6 weeks after surgery.       Maria De Jesus Flower PA-C  Department of Orthopedic Surgery  [facility]  Office: [phone]  Website: [email or web address]        No orders of the defined types were placed in this encounter.

## 2022-10-31 ENCOUNTER — OFFICE VISIT (OUTPATIENT)
Dept: URGENT CARE | Facility: CLINIC | Age: 22
End: 2022-10-31
Payer: MEDICAID

## 2022-10-31 VITALS
SYSTOLIC BLOOD PRESSURE: 117 MMHG | HEIGHT: 74 IN | BODY MASS INDEX: 18.35 KG/M2 | HEART RATE: 68 BPM | WEIGHT: 143 LBS | RESPIRATION RATE: 19 BRPM | OXYGEN SATURATION: 98 % | DIASTOLIC BLOOD PRESSURE: 76 MMHG | TEMPERATURE: 98 F

## 2022-10-31 DIAGNOSIS — Z20.2 EXPOSURE TO CHLAMYDIA: ICD-10-CM

## 2022-10-31 DIAGNOSIS — Z20.2 EXPOSURE TO SEXUALLY TRANSMITTED DISEASE (STD): Primary | ICD-10-CM

## 2022-10-31 LAB
BILIRUB UR QL STRIP: NEGATIVE
GLUCOSE UR QL STRIP: NEGATIVE
KETONES UR QL STRIP: NEGATIVE
LEUKOCYTE ESTERASE UR QL STRIP: NEGATIVE
PH, POC UA: 7.5 (ref 5–8)
POC BLOOD, URINE: NEGATIVE
POC NITRATES, URINE: NEGATIVE
PROT UR QL STRIP: NEGATIVE
SP GR UR STRIP: 1.01 (ref 1–1.03)
UROBILINOGEN UR STRIP-ACNC: NORMAL (ref 0.3–2.2)

## 2022-10-31 PROCEDURE — 1159F PR MEDICATION LIST DOCUMENTED IN MEDICAL RECORD: ICD-10-PCS | Mod: CPTII,S$GLB,, | Performed by: NURSE PRACTITIONER

## 2022-10-31 PROCEDURE — 99213 OFFICE O/P EST LOW 20 MIN: CPT | Mod: S$GLB,,, | Performed by: NURSE PRACTITIONER

## 2022-10-31 PROCEDURE — 3074F SYST BP LT 130 MM HG: CPT | Mod: CPTII,S$GLB,, | Performed by: NURSE PRACTITIONER

## 2022-10-31 PROCEDURE — 1159F MED LIST DOCD IN RCRD: CPT | Mod: CPTII,S$GLB,, | Performed by: NURSE PRACTITIONER

## 2022-10-31 PROCEDURE — 3078F DIAST BP <80 MM HG: CPT | Mod: CPTII,S$GLB,, | Performed by: NURSE PRACTITIONER

## 2022-10-31 PROCEDURE — 1160F RVW MEDS BY RX/DR IN RCRD: CPT | Mod: CPTII,S$GLB,, | Performed by: NURSE PRACTITIONER

## 2022-10-31 PROCEDURE — 87491 CHLMYD TRACH DNA AMP PROBE: CPT | Performed by: NURSE PRACTITIONER

## 2022-10-31 PROCEDURE — 3074F PR MOST RECENT SYSTOLIC BLOOD PRESSURE < 130 MM HG: ICD-10-PCS | Mod: CPTII,S$GLB,, | Performed by: NURSE PRACTITIONER

## 2022-10-31 PROCEDURE — 99213 PR OFFICE/OUTPT VISIT, EST, LEVL III, 20-29 MIN: ICD-10-PCS | Mod: S$GLB,,, | Performed by: NURSE PRACTITIONER

## 2022-10-31 PROCEDURE — 87591 N.GONORRHOEAE DNA AMP PROB: CPT | Performed by: NURSE PRACTITIONER

## 2022-10-31 PROCEDURE — 81003 POCT URINALYSIS, DIPSTICK, AUTOMATED, W/O SCOPE: ICD-10-PCS | Mod: QW,S$GLB,, | Performed by: NURSE PRACTITIONER

## 2022-10-31 PROCEDURE — 87661 TRICHOMONAS VAGINALIS AMPLIF: CPT | Performed by: NURSE PRACTITIONER

## 2022-10-31 PROCEDURE — 1160F PR REVIEW ALL MEDS BY PRESCRIBER/CLIN PHARMACIST DOCUMENTED: ICD-10-PCS | Mod: CPTII,S$GLB,, | Performed by: NURSE PRACTITIONER

## 2022-10-31 PROCEDURE — 3078F PR MOST RECENT DIASTOLIC BLOOD PRESSURE < 80 MM HG: ICD-10-PCS | Mod: CPTII,S$GLB,, | Performed by: NURSE PRACTITIONER

## 2022-10-31 PROCEDURE — 81003 URINALYSIS AUTO W/O SCOPE: CPT | Mod: QW,S$GLB,, | Performed by: NURSE PRACTITIONER

## 2022-10-31 RX ORDER — DOXYCYCLINE 100 MG/1
100 CAPSULE ORAL 2 TIMES DAILY
Qty: 14 CAPSULE | Refills: 0 | Status: SHIPPED | OUTPATIENT
Start: 2022-10-31 | End: 2022-11-07

## 2022-10-31 NOTE — PATIENT INSTRUCTIONS
STD Screening  You were tested for STDs today, we will call you in 5-7 days with the results of the testing  You were treated for chlamydia today  Please take all antibiotics as directed to completion  If you need more medication when the labs result come in, we will call you and phone them in for you.    Increase condom use to prevent occurance.      IF POSITIVE:  Notify sexual partners of the need for testing.    Complete ALL medication prescribed.    NO sexual intercourse for 7 days after treatment.   Retest to ensure infection has cleared-there are infections that require more agressive treatment.  Retest for all in 6 weeks and again in 6 months to ensure true negative results.      Today's testing will give no crediable information if you have unprotected sexual activities going forward.     Syphillis cases are rising!    Gonorrhea has RESISTANT strains which is why repeat testing after treatment is important.    Gonorrhea may be present in multiple sites from just ONE area of exposure.      REMEMBER WEAR CONDOMS AND GET TESTED OFTEN.

## 2022-10-31 NOTE — PROGRESS NOTES
"Subjective:       Patient ID: Shekhar Snyder is a 22 y.o. male.    Vitals:  height is 6' 2" (1.88 m) and weight is 64.9 kg (143 lb). His temperature is 97.8 °F (36.6 °C). His blood pressure is 117/76 and his pulse is 68. His respiration is 19 and oxygen saturation is 98%.     Chief Complaint: Exposure to STD    This is a 22 y.o. male who presents today with a chief complaint of STD concerns that started several weeks ago.Pt explain that he is having a burning feeling when he urinate.  Patient reports his new girlfriend tested positive for chlamydia recently, patient reports he had unprotected sex, Denies frequency urgency, denies back pain or flank pain, denies this testicular pain or scrotal pain or tenderness, denies penile pain or discharge, or any other symptoms      Exposure to STD  The patient's pertinent negatives include no penile pain. This is a new problem. The current episode started 1 to 4 weeks ago. The problem occurs constantly. The problem has been unchanged. The pain is mild. Associated symptoms include dysuria. Pertinent negatives include no discolored urine, flank pain, frequency or urgency. The symptoms are aggravated by urination. He has tried nothing for the symptoms. The treatment provided no relief. He is sexually active. He inconsistently uses condoms. It is possible that his partner has an STD.     Genitourinary:  Positive for dysuria. Negative for frequency, urgency, flank pain and penile pain.     Objective:      Physical Exam   Constitutional: He is oriented to person, place, and time. He appears well-developed. No distress.   HENT:   Head: Normocephalic and atraumatic.   Ears:   Right Ear: External ear normal.   Left Ear: External ear normal.   Nose: Nose normal. No nasal deformity. No epistaxis.   Mouth/Throat: Oropharynx is clear and moist and mucous membranes are normal.   Eyes: Conjunctivae and lids are normal.   Neck: Trachea normal and phonation normal. Neck supple. "   Cardiovascular: Normal rate, regular rhythm and normal heart sounds.   Pulmonary/Chest: Effort normal and breath sounds normal.   Abdominal: Normal appearance and bowel sounds are normal. He exhibits no distension. Soft. There is no abdominal tenderness.   Musculoskeletal: Normal range of motion.         General: Normal range of motion.   Neurological: He is alert and oriented to person, place, and time. He has normal reflexes.   Skin: Skin is warm, dry, intact and not diaphoretic.   Psychiatric: His speech is normal and behavior is normal. Judgment and thought content normal.   Nursing note and vitals reviewed.      Results for orders placed or performed in visit on 10/31/22   POCT Urinalysis, Dipstick, Automated, W/O Scope   Result Value Ref Range    POC Blood, Urine Negative Negative    POC Bilirubin, Urine Negative Negative    POC Urobilinogen, Urine Norm 0.3 - 2.2    POC Ketones, Urine Negative Negative    POC Protein, Urine Negative Negative    POC Nitrates, Urine Negative Negative    POC Glucose, Urine Negative Negative    pH, UA 7.5 5 - 8    POC Specific Gravity, Urine 1.015 1.003 - 1.029    POC Leukocytes, Urine Negative Negative       Patient in no acute distress.  Vitals reassuring.  Discussed results/diagnosis/plan in depth with patient in clinic. Strict precautions given to patient to monitor for worsening signs and symptoms. Advised to follow up with primary.All questions answered. Strict ER precautions given. If your symptoms worsens or fail to improve you should go to the Emergency Room. Discharge and follow-up instructions given verbally/printed. Discharge and follow-up instructions discussed with the patient who expressed understanding and willingness to comply with my recommendations.Patient voiced understanding and in agreement with current treatment plan.     Please be advised this text was dictated with CENX software and may contain errors due to translation.    Assessment:       1.  Exposure to sexually transmitted disease (STD)    2. Exposure to chlamydia          Plan:         Exposure to sexually transmitted disease (STD)  -     POCT Urinalysis, Dipstick, Automated, W/O Scope  -     C. trachomatis/N. gonorrhoeae by AMP DNA  -     Trichomonas vaginalis, RNA, Qual, Urine    Exposure to chlamydia  -     doxycycline (MONODOX) 100 MG capsule; Take 1 capsule (100 mg total) by mouth 2 (two) times daily. for 7 days  Dispense: 14 capsule; Refill: 0         Medical Decision Making:   Clinical Tests:   Lab Tests: Reviewed  Urgent Care Management:  Patient in no acute distress.  Vitals reassuring.  On exam, patient is nontoxic appearing and afebrile.  Patient with exposure to chlamydia.  STD lab work sent.  Will call Back results in 3-5 days.  Detailed education provided about STD precautions.  Patient declined STD lab work.  Proper hydration advised.  Medication prescribed and over-the-counter medications discussed.Patient voiced understanding and in agreement with current treatment plan.         Patient Instructions   STD Screening  You were tested for STDs today, we will call you in 5-7 days with the results of the testing  You were treated for chlamydia today  Please take all antibiotics as directed to completion  If you need more medication when the labs result come in, we will call you and phone them in for you.    Increase condom use to prevent occurance.      IF POSITIVE:  Notify sexual partners of the need for testing.    Complete ALL medication prescribed.    NO sexual intercourse for 7 days after treatment.   Retest to ensure infection has cleared-there are infections that require more agressive treatment.  Retest for all in 6 weeks and again in 6 months to ensure true negative results.      Today's testing will give no crediable information if you have unprotected sexual activities going forward.     Syphillis cases are rising!    Gonorrhea has RESISTANT strains which is why repeat testing  after treatment is important.    Gonorrhea may be present in multiple sites from just ONE area of exposure.      REMEMBER WEAR CONDOMS AND GET TESTED OFTEN.

## 2022-11-01 LAB
C TRACH DNA SPEC QL NAA+PROBE: DETECTED
N GONORRHOEA DNA SPEC QL NAA+PROBE: NOT DETECTED

## 2022-11-03 ENCOUNTER — TELEPHONE (OUTPATIENT)
Dept: URGENT CARE | Facility: CLINIC | Age: 22
End: 2022-11-03
Payer: MEDICAID

## 2022-11-03 LAB
T VAGINALIS RRNA SPEC QL NAA+PROBE: NOT DETECTED
TRICHOMONAS VAGINALIS RNA, QUAL, SOURCE: NORMAL

## 2022-11-09 ENCOUNTER — OFFICE VISIT (OUTPATIENT)
Dept: ORTHOPEDICS | Facility: CLINIC | Age: 22
End: 2022-11-09
Payer: MEDICAID

## 2022-11-09 VITALS
DIASTOLIC BLOOD PRESSURE: 55 MMHG | SYSTOLIC BLOOD PRESSURE: 112 MMHG | BODY MASS INDEX: 18.11 KG/M2 | HEIGHT: 74 IN | WEIGHT: 141.13 LBS | HEART RATE: 87 BPM

## 2022-11-09 DIAGNOSIS — Z72.0 NICOTINE ABUSE: ICD-10-CM

## 2022-11-09 DIAGNOSIS — S42.022D CLOSED DISPLACED FRACTURE OF SHAFT OF LEFT CLAVICLE WITH ROUTINE HEALING, SUBSEQUENT ENCOUNTER: Primary | ICD-10-CM

## 2022-11-09 DIAGNOSIS — S42.022A CLOSED DISPLACED FRACTURE OF SHAFT OF LEFT CLAVICLE, INITIAL ENCOUNTER: Primary | ICD-10-CM

## 2022-11-09 PROCEDURE — 3074F SYST BP LT 130 MM HG: CPT | Mod: CPTII,,, | Performed by: PHYSICIAN ASSISTANT

## 2022-11-09 PROCEDURE — 1159F PR MEDICATION LIST DOCUMENTED IN MEDICAL RECORD: ICD-10-PCS | Mod: CPTII,,, | Performed by: PHYSICIAN ASSISTANT

## 2022-11-09 PROCEDURE — 1159F MED LIST DOCD IN RCRD: CPT | Mod: CPTII,,, | Performed by: PHYSICIAN ASSISTANT

## 2022-11-09 PROCEDURE — 99999 PR PBB SHADOW E&M-EST. PATIENT-LVL III: ICD-10-PCS | Mod: PBBFAC,,, | Performed by: PHYSICIAN ASSISTANT

## 2022-11-09 PROCEDURE — 3008F BODY MASS INDEX DOCD: CPT | Mod: CPTII,,, | Performed by: PHYSICIAN ASSISTANT

## 2022-11-09 PROCEDURE — 3074F PR MOST RECENT SYSTOLIC BLOOD PRESSURE < 130 MM HG: ICD-10-PCS | Mod: CPTII,,, | Performed by: PHYSICIAN ASSISTANT

## 2022-11-09 PROCEDURE — 99999 PR PBB SHADOW E&M-EST. PATIENT-LVL III: CPT | Mod: PBBFAC,,, | Performed by: PHYSICIAN ASSISTANT

## 2022-11-09 PROCEDURE — 99024 POSTOP FOLLOW-UP VISIT: CPT | Mod: ,,, | Performed by: PHYSICIAN ASSISTANT

## 2022-11-09 PROCEDURE — 3078F DIAST BP <80 MM HG: CPT | Mod: CPTII,,, | Performed by: PHYSICIAN ASSISTANT

## 2022-11-09 PROCEDURE — 99024 PR POST-OP FOLLOW-UP VISIT: ICD-10-PCS | Mod: ,,, | Performed by: PHYSICIAN ASSISTANT

## 2022-11-09 PROCEDURE — 99213 OFFICE O/P EST LOW 20 MIN: CPT | Mod: PBBFAC,PN | Performed by: PHYSICIAN ASSISTANT

## 2022-11-09 PROCEDURE — 3008F PR BODY MASS INDEX (BMI) DOCUMENTED: ICD-10-PCS | Mod: CPTII,,, | Performed by: PHYSICIAN ASSISTANT

## 2022-11-09 PROCEDURE — 3078F PR MOST RECENT DIASTOLIC BLOOD PRESSURE < 80 MM HG: ICD-10-PCS | Mod: CPTII,,, | Performed by: PHYSICIAN ASSISTANT

## 2022-11-09 NOTE — PROGRESS NOTES
[facility], Orthopedics and Sports Medicine  [facility]    Shoulder Post-op Visit  11/09/2022     Diagnosis:  Closed displaced fracture of shaft of left clavicle, initial encounter      Procedure: 9/20/22   Left Clavicle fracture ORIF     Subjective:      Shekhar Snyder is a 22 y.o. male who is now 7 weeks status post left shoulder surgery. The patient is not having any pain. The patient denies none, fever, wound drainage, increasing redness, pus, increasing pain, increasing swelling. Post op problems reported: none.    Patient was scheduled today as a 3 mon visit but actually a 6 week follow up. Was supposed to see Dr. Reynoso.   No pain or complaints today. Using his arm and weightbearing without any problems. Still vaping.      Objective:      Ortho/SPM Exam  General: alert, appears stated age, and cooperative   Sutures: Sutures out.  Incision: healing well, no significant drainage, no dehiscence, no significant erythema  Tenderness: none  Forward Flexion: 180 degrees  External Rotation: 90 degrees  Elbow/Wrist/Hand: Full ROM  Neuro: Intact to light touch Ax/R/U/M  Vascular: CR<2s. Palpable radial pulse      Imaging:  No images taken today       Assessment:       The patient is status post left shoulder surgery. The primary encounter diagnosis was Closed displaced fracture of shaft of left clavicle with routine healing, subsequent encounter. A diagnosis of Nicotine abuse was also pertinent to this visit.  Doing well postoperatively. Continuation of post-op rehab course is recommended at this time. All of the patient's questions were answered.    Continue WBAT LUE.      Plan:      Weightbearing as tolerated on operative extremity.  Gradually return to all activities as tolerated.  Follow up at 3 months after surgery.       Maria De Jesus Flower PA-C  Department of Orthopedic Surgery  [facility]  Office: [phone]  Website: [email or web address]        No orders  of the defined types were placed in this encounter.

## 2022-11-20 ENCOUNTER — TELEPHONE (OUTPATIENT)
Dept: URGENT CARE | Facility: CLINIC | Age: 22
End: 2022-11-20
Payer: MEDICAID

## 2022-12-21 ENCOUNTER — OFFICE VISIT (OUTPATIENT)
Dept: ORTHOPEDICS | Facility: CLINIC | Age: 22
End: 2022-12-21
Payer: MEDICAID

## 2022-12-21 VITALS
BODY MASS INDEX: 18.31 KG/M2 | DIASTOLIC BLOOD PRESSURE: 74 MMHG | SYSTOLIC BLOOD PRESSURE: 117 MMHG | HEIGHT: 74 IN | HEART RATE: 71 BPM | WEIGHT: 142.63 LBS

## 2022-12-21 DIAGNOSIS — S42.022D CLOSED DISPLACED FRACTURE OF SHAFT OF LEFT CLAVICLE WITH ROUTINE HEALING, SUBSEQUENT ENCOUNTER: Primary | ICD-10-CM

## 2022-12-21 PROCEDURE — 1160F RVW MEDS BY RX/DR IN RCRD: CPT | Mod: CPTII,,, | Performed by: PHYSICIAN ASSISTANT

## 2022-12-21 PROCEDURE — 1159F PR MEDICATION LIST DOCUMENTED IN MEDICAL RECORD: ICD-10-PCS | Mod: CPTII,,, | Performed by: PHYSICIAN ASSISTANT

## 2022-12-21 PROCEDURE — 3074F SYST BP LT 130 MM HG: CPT | Mod: CPTII,,, | Performed by: PHYSICIAN ASSISTANT

## 2022-12-21 PROCEDURE — 99024 POSTOP FOLLOW-UP VISIT: CPT | Mod: ,,, | Performed by: PHYSICIAN ASSISTANT

## 2022-12-21 PROCEDURE — 3008F PR BODY MASS INDEX (BMI) DOCUMENTED: ICD-10-PCS | Mod: CPTII,,, | Performed by: PHYSICIAN ASSISTANT

## 2022-12-21 PROCEDURE — 99213 OFFICE O/P EST LOW 20 MIN: CPT | Mod: PBBFAC,PN | Performed by: PHYSICIAN ASSISTANT

## 2022-12-21 PROCEDURE — 3008F BODY MASS INDEX DOCD: CPT | Mod: CPTII,,, | Performed by: PHYSICIAN ASSISTANT

## 2022-12-21 PROCEDURE — 3078F DIAST BP <80 MM HG: CPT | Mod: CPTII,,, | Performed by: PHYSICIAN ASSISTANT

## 2022-12-21 PROCEDURE — 3074F PR MOST RECENT SYSTOLIC BLOOD PRESSURE < 130 MM HG: ICD-10-PCS | Mod: CPTII,,, | Performed by: PHYSICIAN ASSISTANT

## 2022-12-21 PROCEDURE — 99024 PR POST-OP FOLLOW-UP VISIT: ICD-10-PCS | Mod: ,,, | Performed by: PHYSICIAN ASSISTANT

## 2022-12-21 PROCEDURE — 1159F MED LIST DOCD IN RCRD: CPT | Mod: CPTII,,, | Performed by: PHYSICIAN ASSISTANT

## 2022-12-21 PROCEDURE — 3078F PR MOST RECENT DIASTOLIC BLOOD PRESSURE < 80 MM HG: ICD-10-PCS | Mod: CPTII,,, | Performed by: PHYSICIAN ASSISTANT

## 2022-12-21 PROCEDURE — 99999 PR PBB SHADOW E&M-EST. PATIENT-LVL III: CPT | Mod: PBBFAC,,, | Performed by: PHYSICIAN ASSISTANT

## 2022-12-21 PROCEDURE — 99999 PR PBB SHADOW E&M-EST. PATIENT-LVL III: ICD-10-PCS | Mod: PBBFAC,,, | Performed by: PHYSICIAN ASSISTANT

## 2022-12-21 PROCEDURE — 1160F PR REVIEW ALL MEDS BY PRESCRIBER/CLIN PHARMACIST DOCUMENTED: ICD-10-PCS | Mod: CPTII,,, | Performed by: PHYSICIAN ASSISTANT

## 2022-12-21 NOTE — PROGRESS NOTES
[facility], Orthopedics and Sports Medicine  [facility]    Shoulder Post-op Visit  12/21/2022     Diagnosis:  Closed displaced fracture of shaft of left clavicle, initial encounter      Procedure: 9/20/22   Left Clavicle fracture ORIF     Subjective:      Shekhar Snyder is a 22 y.o. male who is now 3 months status post left shoulder surgery. The patient is not having any pain. The patient denies none, fever, wound drainage, increasing redness, pus, increasing pain, increasing swelling. Post op problems reported: none.  Patient doing well. Has some stiffness with neck motion.      Objective:      Ortho/SPM Exam  General: alert, appears stated age, and cooperative   Sutures: Sutures out.  Incision: healing well, no significant drainage, no dehiscence, no significant erythema  Tenderness: none  Forward Flexion: 180 degrees  External Rotation: 90 degrees  Elbow/Wrist/Hand: Full ROM  Neuro: Intact to light touch Ax/R/U/M  Vascular: CR<2s. Palpable radial pulse      Imaging:  None       Assessment:       The patient is status post left shoulder surgery. The encounter diagnosis was Closed displaced fracture of shaft of left clavicle with routine healing, subsequent encounter.  Doing well postoperatively. Continuation of post-op rehab course is recommended at this time. All of the patient's questions were answered.    Patient doing well. No complaints today.      Plan:      Weightbearing as tolerated on operative extremity.  Follow up as needed.       Maria De Jesus Flower PA-C  Department of Orthopedic Surgery  [facility]  Office: [phone]  Website: [email or web address]        No orders of the defined types were placed in this encounter.

## 2023-06-12 ENCOUNTER — OFFICE VISIT (OUTPATIENT)
Dept: URGENT CARE | Facility: CLINIC | Age: 23
End: 2023-06-12
Payer: COMMERCIAL

## 2023-06-12 VITALS
RESPIRATION RATE: 18 BRPM | SYSTOLIC BLOOD PRESSURE: 120 MMHG | BODY MASS INDEX: 18.22 KG/M2 | DIASTOLIC BLOOD PRESSURE: 70 MMHG | TEMPERATURE: 98 F | HEART RATE: 84 BPM | WEIGHT: 142 LBS | OXYGEN SATURATION: 99 % | HEIGHT: 74 IN

## 2023-06-12 DIAGNOSIS — K00.7 TOOTH ERUPTION: Primary | ICD-10-CM

## 2023-06-12 PROCEDURE — 99213 OFFICE O/P EST LOW 20 MIN: CPT | Mod: S$GLB,,, | Performed by: NURSE PRACTITIONER

## 2023-06-12 PROCEDURE — 99213 PR OFFICE/OUTPT VISIT, EST, LEVL III, 20-29 MIN: ICD-10-PCS | Mod: S$GLB,,, | Performed by: NURSE PRACTITIONER

## 2023-06-12 NOTE — PROGRESS NOTES
Subjective:      Patient ID: Shekhar Snyder is a 22 y.o. male.    Chief Complaint: No chief complaint on file.    HPI  ROS  Objective:     Physical Exam   Assessment:      No diagnosis found.  Plan:                 No follow-ups on file.

## 2023-06-12 NOTE — PROGRESS NOTES
"Subjective:      Patient ID: Shekhar Snyder is a 22 y.o. male.    Vitals:  height is 6' 2" (1.88 m) and weight is 64.4 kg (142 lb). His temperature is 97.6 °F (36.4 °C). His blood pressure is 120/70 and his pulse is 84. His respiration is 18 and oxygen saturation is 99%.     Chief Complaint: Dental Pain    Pt states he has been having upper gum and dental concern, sx started a week ago, pain scale 0/10 , he does not have a dentist   States it feels like a "hole back there."    Dental Pain   This is a new problem. The current episode started in the past 7 days. The problem occurs constantly. The problem has been gradually worsening. The pain is at a severity of 0/10. The patient is experiencing no pain. Pertinent negatives include no difficulty swallowing, facial pain, fever, oral bleeding, sinus pressure or thermal sensitivity. He has tried nothing for the symptoms.     Constitution: Negative for fever.   HENT:  Negative for sinus pressure.    Skin:  Negative for erythema.    Objective:     Physical Exam   Constitutional: He is oriented to person, place, and time. He appears well-developed.  Non-toxic appearance. He does not appear ill. No distress.   HENT:   Head: Normocephalic and atraumatic. Head is without abrasion, without contusion and without laceration.   Ears:   Right Ear: External ear normal.   Left Ear: External ear normal.   Nose: Nose normal.   Mouth/Throat: Uvula is midline, oropharynx is clear and moist and mucous membranes are normal. No dental abscesses.       Eyes: Conjunctivae, EOM and lids are normal. Pupils are equal, round, and reactive to light.   Neck: Trachea normal and phonation normal. Neck supple.   Cardiovascular: Normal rate, regular rhythm and normal heart sounds.   Pulmonary/Chest: Effort normal and breath sounds normal. No stridor. No respiratory distress.   Musculoskeletal: Normal range of motion.         General: Normal range of motion.   Neurological: He is alert and oriented to " person, place, and time.   Skin: Skin is warm, dry, intact, not diaphoretic and no rash. Capillary refill takes less than 2 seconds. No abrasion, No burn, No bruising, No erythema and No ecchymosis   Psychiatric: His speech is normal and behavior is normal. Judgment and thought content normal.   Nursing note and vitals reviewed.    Assessment:     1. Tooth eruption        Plan:       Tooth eruption        Patient Instructions   Follow up closely with dentist.  Return here or go to the ER as needed for worsening condition.

## 2024-04-24 ENCOUNTER — OFFICE VISIT (OUTPATIENT)
Dept: URGENT CARE | Facility: CLINIC | Age: 24
End: 2024-04-24
Payer: COMMERCIAL

## 2024-04-24 VITALS
HEART RATE: 120 BPM | WEIGHT: 142 LBS | RESPIRATION RATE: 18 BRPM | DIASTOLIC BLOOD PRESSURE: 70 MMHG | BODY MASS INDEX: 18.22 KG/M2 | TEMPERATURE: 102 F | OXYGEN SATURATION: 95 % | SYSTOLIC BLOOD PRESSURE: 121 MMHG | HEIGHT: 74 IN

## 2024-04-24 DIAGNOSIS — R06.2 WHEEZING WITHOUT DIAGNOSIS OF ASTHMA: ICD-10-CM

## 2024-04-24 DIAGNOSIS — J20.8 ACUTE BACTERIAL BRONCHITIS: Primary | ICD-10-CM

## 2024-04-24 DIAGNOSIS — B96.89 ACUTE BACTERIAL BRONCHITIS: Primary | ICD-10-CM

## 2024-04-24 DIAGNOSIS — R05.9 COUGH, UNSPECIFIED TYPE: ICD-10-CM

## 2024-04-24 DIAGNOSIS — R50.9 FEVER, UNSPECIFIED FEVER CAUSE: ICD-10-CM

## 2024-04-24 DIAGNOSIS — R06.2 WHEEZING: ICD-10-CM

## 2024-04-24 LAB
CTP QC/QA: YES
CTP QC/QA: YES
POC MOLECULAR INFLUENZA A AGN: NEGATIVE
POC MOLECULAR INFLUENZA B AGN: NEGATIVE
SARS-COV-2 AG RESP QL IA.RAPID: NEGATIVE

## 2024-04-24 PROCEDURE — 87502 INFLUENZA DNA AMP PROBE: CPT | Mod: QW,S$GLB,,

## 2024-04-24 PROCEDURE — 94640 AIRWAY INHALATION TREATMENT: CPT | Mod: S$GLB,,,

## 2024-04-24 PROCEDURE — 99214 OFFICE O/P EST MOD 30 MIN: CPT | Mod: 25,S$GLB,,

## 2024-04-24 PROCEDURE — 87811 SARS-COV-2 COVID19 W/OPTIC: CPT | Mod: QW,S$GLB,,

## 2024-04-24 PROCEDURE — 71046 X-RAY EXAM CHEST 2 VIEWS: CPT | Mod: FY,S$GLB,, | Performed by: RADIOLOGY

## 2024-04-24 RX ORDER — AZITHROMYCIN 250 MG/1
TABLET, FILM COATED ORAL
Qty: 6 TABLET | Refills: 0 | Status: SHIPPED | OUTPATIENT
Start: 2024-04-24 | End: 2024-04-29

## 2024-04-24 RX ORDER — PROMETHAZINE HYDROCHLORIDE AND DEXTROMETHORPHAN HYDROBROMIDE 6.25; 15 MG/5ML; MG/5ML
5 SYRUP ORAL EVERY 4 HOURS PRN
Qty: 118 ML | Refills: 0 | Status: SHIPPED | OUTPATIENT
Start: 2024-04-24

## 2024-04-24 RX ORDER — ALBUTEROL SULFATE 90 UG/1
2 AEROSOL, METERED RESPIRATORY (INHALATION) EVERY 4 HOURS PRN
Qty: 18 G | Refills: 0 | Status: SHIPPED | OUTPATIENT
Start: 2024-04-24

## 2024-04-24 RX ORDER — PREDNISONE 20 MG/1
40 TABLET ORAL DAILY
Qty: 10 TABLET | Refills: 0 | Status: SHIPPED | OUTPATIENT
Start: 2024-04-24 | End: 2024-04-29

## 2024-04-24 RX ORDER — ALBUTEROL SULFATE 0.83 MG/ML
2.5 SOLUTION RESPIRATORY (INHALATION)
Status: COMPLETED | OUTPATIENT
Start: 2024-04-24 | End: 2024-04-24

## 2024-04-24 RX ORDER — ACETAMINOPHEN 500 MG
1000 TABLET ORAL
Status: COMPLETED | OUTPATIENT
Start: 2024-04-24 | End: 2024-04-24

## 2024-04-24 RX ADMIN — Medication 1000 MG: at 03:04

## 2024-04-24 RX ADMIN — ALBUTEROL SULFATE 2.5 MG: 0.83 SOLUTION RESPIRATORY (INHALATION) at 03:04

## 2024-04-24 NOTE — PROGRESS NOTES
"Subjective:      Patient ID: Shekhar Snyder is a 23 y.o. male.    Vitals:  height is 6' 2" (1.88 m) and weight is 64.4 kg (141 lb 15.6 oz). His oral temperature is 102 °F (38.9 °C) (abnormal). His blood pressure is 121/70 and his pulse is 120 (abnormal). His respiration is 18 and oxygen saturation is 95%.     Chief Complaint: Cough    Pt arrives for cough,runny nose, and wheezing . Symptoms started yesterday . At home tx included ibuprofen with no relief.    Provider note starts below:  Patient presents to clinic for evaluation of cough, wheezing, rhinorrhea, congestion, headache, and fever.  States he recently went on a fishing trip in [place] and returned this morning.  Yesterday patient noticed cough, however most of symptoms started in the middle of the night last night.  He took ibuprofen yesterday but no medications today.  Mohamud contacts.  Patient does report history of pneumonia approximately once a year as a teenager.  No history of asthma.  Patient denies any ear pain, sinus pain, sore throat, abdominal pain, chest pain, palpitations, shortness for breath, blood in sputum, nausea, vomiting.  No other complaints.    Cough  This is a new problem. The current episode started yesterday. The problem has been rapidly worsening. The problem occurs constantly. The cough is Non-productive. Associated symptoms include chills, a fever, headaches, myalgias, nasal congestion, postnasal drip and wheezing. Pertinent negatives include no chest pain, ear pain, hemoptysis, rash, sore throat or shortness of breath. Treatments tried: ibuprofen. The treatment provided no relief.       Constitution: Positive for chills and fever. Negative for activity change, appetite change, sweating, fatigue and generalized weakness.   HENT:  Positive for congestion and postnasal drip. Negative for ear pain, ear discharge, sinus pain, sinus pressure, sore throat, trouble swallowing and voice change.         +rhinorrhea   Neck: Negative for " neck pain and neck stiffness.   Cardiovascular:  Negative for chest pain, leg swelling, palpitations and sob on exertion.   Eyes:  Negative for eye discharge and eye itching.   Respiratory:  Positive for cough and wheezing. Negative for chest tightness, sputum production, bloody sputum, shortness of breath and stridor.    Gastrointestinal:  Negative for abdominal pain, nausea and vomiting.   Musculoskeletal:  Positive for muscle ache. Negative for muscle cramps.   Skin:  Negative for pale and rash.   Neurological:  Positive for headaches. Negative for dizziness, light-headedness, passing out, disorientation, altered mental status, loss of consciousness, numbness and tingling.   Psychiatric/Behavioral:  Negative for altered mental status, disorientation and confusion.       Objective:     Physical Exam   Constitutional: He is oriented to person, place, and time.  Non-toxic appearance. He appears ill. No distress.   HENT:   Head: Normocephalic and atraumatic.   Ears:   Right Ear: Tympanic membrane, external ear and ear canal normal.   Left Ear: Tympanic membrane, external ear and ear canal normal.   Nose: Rhinorrhea present.   Mouth/Throat: Mucous membranes are moist. No oropharyngeal exudate or posterior oropharyngeal erythema. Oropharynx is clear.   Eyes: Conjunctivae are normal. Extraocular movement intact   Neck: Neck supple.   Cardiovascular: Regular rhythm, normal heart sounds and normal pulses. Tachycardia present.   Pulmonary/Chest: Effort normal. No respiratory distress. He has wheezes. He has no rhonchi. He has no rales.         Comments: Inspiratory and expiratory wheezes bilaterally.  Able to speak in full sentences.  No nasal flaring.  No tripoding.  No accessory muscle use.  No cyanosis.    Musculoskeletal: Normal range of motion.         General: Normal range of motion.   Lymphadenopathy:     He has no cervical adenopathy.   Neurological: He is alert, oriented to person, place, and time and at baseline.    Skin: Skin is warm and dry.   Psychiatric: His behavior is normal. Mood normal.   Nursing note and vitals reviewed.    Assessment:     XR Chest  Impression: Clear lungs.   Radiologist: Ernestina Burleson MD     Results for orders placed or performed in visit on 04/24/24   SARS Coronavirus 2 Antigen, POCT Manual Read   Result Value Ref Range    SARS Coronavirus 2 Antigen Negative Negative     Acceptable Yes    POCT Influenza A/B MOLECULAR   Result Value Ref Range    POC Molecular Influenza A Ag Negative Negative    POC Molecular Influenza B Ag Negative Negative     Acceptable Yes        1. Acute bacterial bronchitis    2. Cough, unspecified type    3. Fever, unspecified fever cause    4. Wheezing    5. Wheezing without diagnosis of asthma        Plan:     Acute bacterial bronchitis  -     albuterol (VENTOLIN HFA) 90 mcg/actuation inhaler; Inhale 2 puffs into the lungs every 4 (four) hours as needed for Wheezing or Shortness of Breath. Rescue  Dispense: 18 g; Refill: 0  -     predniSONE (DELTASONE) 20 MG tablet; Take 2 tablets (40 mg total) by mouth once daily. for 5 days  Dispense: 10 tablet; Refill: 0  -     azithromycin (Z-GAYLE) 250 MG tablet; Take 2 tablets by mouth on day 1; Take 1 tablet by mouth on days 2-5  Dispense: 6 tablet; Refill: 0  -     promethazine-dextromethorphan (PROMETHAZINE-DM) 6.25-15 mg/5 mL Syrp; Take 5 mLs by mouth every 4 (four) hours as needed (for cough).  Dispense: 118 mL; Refill: 0    Cough, unspecified type  -     SARS Coronavirus 2 Antigen, POCT Manual Read  -     POCT Influenza A/B MOLECULAR  -     XR CHEST PA AND LATERAL; Future; Expected date: 04/24/2024    Fever, unspecified fever cause  -     acetaminophen tablet 1,000 mg  -     XR CHEST PA AND LATERAL; Future; Expected date: 04/24/2024    Wheezing  -     albuterol nebulizer solution 2.5 mg  -     XR CHEST PA AND LATERAL; Future; Expected date: 04/24/2024    Wheezing without diagnosis of asthma  -      Ambulatory referral/consult to Pulmonology      Medical Decision Making:   Urgent Care Management:  Tylenol 1 g given in clinic for fever 102 upon arrival.  Flu and COVID negative today.  Significant wheezing auscultated upon my exam.    Albuterol treatment given in clinic.  Due to history of pneumonia, fever, and respiratory symptoms, will obtain chest x-ray today.  X-ray shows clear lungs.       Patient Instructions   COVID and flu tests today were negative.  Chest x-ray was normal.  Tylenol 1 g given in clinic for fever.    A referral has been placed for you to be seen with a pulmonologist for further evaluation. You should receive a call from [facility] within the next 1-3 days to set up an appointment. If you do not receive a call, you may call our Referral Hotline at [phone] to make an appointment.    Treatment  Make breathing easier: continue albuterol inhaler every 4h prn SOB  Control coughing: promethazine-DM Take 5 mLs by mouth every 6 (six) hours as needed (Maximum is 30 ML in 24 hours).,   Lower swelling in your airways: prednisone 40 mg daily for 5 days  Treat infection: azithromycin   Control extra mucus: guaifenesin 400 mg every 4h prn sputum production. It is available OTC.  Alternate tylenol and ibuprofen as needed for fever    Care at home  Do not smoke or be in smoke-filled places. Avoid other things that may cause breathing problems like fumes, pollution, dust, and other common allergens.  Drink lots of water, juice, or broth to replace fluids lost in runny nose and fever.  If you have medicines to take when you are feeling short of breath, be sure to carry them with you. Then, you can take them when needed.  If you smoke, stop.  Take warm, steamy showers to help soothe the cough.  Use a cool mist humidifier. This may make it easier to breathe.  Use hard candy or cough drops to soothe sore throat and cough.  Wash your hands often. This will help prevent you from spreading germs to  others.    Return to clinic or go to ED if  Signs of infection. These include a fever of 100.4°F (38°C) or higher, chills, cough, more sputum or change in color of sputum.  You are having so much trouble breathing that you can only say one or two words at a time.  You need to sit upright at all times to be able to breathe and or cannot lie down.  You have trouble breathing when talking or sitting still.  You have a fever of 100.4°F (38°C) or higher or chills.  You have chest pain when you cough, have trouble breathing but can still talk in full sentences, or cough up blood.  You develop a barking cough.  You are still coughing in 3 weeks.    Should you develop any worsening or new symptoms after leaving urgent care, it is recommended that you go to the ER for further/repeat evaluation.      Follow up with your PCP in 3-5 days after your urgent care visit.     Please remember that you have received care at an urgent care today. Urgent cares are not emergency rooms and are not equipped to handle life threatening emergencies and cannot rule in or out certain medical conditions and you may be released before all of your medical problems are known or treated, please schedule all follow up appointments as discussed and if you have worsening symptoms please go to the ER to rule out potential life threatening problems, as discussed.

## 2024-04-24 NOTE — PATIENT INSTRUCTIONS
COVID and flu tests today were negative.  Chest x-ray was normal.  Tylenol 1 g given in clinic for fever.    A referral has been placed for you to be seen with a pulmonologist for further evaluation. You should receive a call from [facility] within the next 1-3 days to set up an appointment. If you do not receive a call, you may call our Referral Hotline at [phone] to make an appointment.    Treatment  Make breathing easier: continue albuterol inhaler every 4h prn SOB  Control coughing: promethazine-DM Take 5 mLs by mouth every 6 (six) hours as needed (Maximum is 30 ML in 24 hours).,   Lower swelling in your airways: prednisone 40 mg daily for 5 days  Treat infection: azithromycin   Control extra mucus: guaifenesin 400 mg every 4h prn sputum production. It is available OTC.  Alternate tylenol and ibuprofen as needed for fever    Care at home  Do not smoke or be in smoke-filled places. Avoid other things that may cause breathing problems like fumes, pollution, dust, and other common allergens.  Drink lots of water, juice, or broth to replace fluids lost in runny nose and fever.  If you have medicines to take when you are feeling short of breath, be sure to carry them with you. Then, you can take them when needed.  If you smoke, stop.  Take warm, steamy showers to help soothe the cough.  Use a cool mist humidifier. This may make it easier to breathe.  Use hard candy or cough drops to soothe sore throat and cough.  Wash your hands often. This will help prevent you from spreading germs to others.    Return to clinic or go to ED if  Signs of infection. These include a fever of 100.4°F (38°C) or higher, chills, cough, more sputum or change in color of sputum.  You are having so much trouble breathing that you can only say one or two words at a time.  You need to sit upright at all times to be able to breathe and or cannot lie down.  You have trouble breathing when talking or sitting still.  You have a fever of 100.4°F  (38°C) or higher or chills.  You have chest pain when you cough, have trouble breathing but can still talk in full sentences, or cough up blood.  You develop a barking cough.  You are still coughing in 3 weeks.    Should you develop any worsening or new symptoms after leaving urgent care, it is recommended that you go to the ER for further/repeat evaluation.      Follow up with your PCP in 3-5 days after your urgent care visit.     Please remember that you have received care at an urgent care today. Urgent cares are not emergency rooms and are not equipped to handle life threatening emergencies and cannot rule in or out certain medical conditions and you may be released before all of your medical problems are known or treated, please schedule all follow up appointments as discussed and if you have worsening symptoms please go to the ER to rule out potential life threatening problems, as discussed.

## 2025-02-06 ENCOUNTER — OFFICE VISIT (OUTPATIENT)
Dept: URGENT CARE | Facility: CLINIC | Age: 25
End: 2025-02-06
Payer: COMMERCIAL

## 2025-02-06 VITALS
TEMPERATURE: 99 F | WEIGHT: 141.13 LBS | HEART RATE: 110 BPM | BODY MASS INDEX: 18.11 KG/M2 | SYSTOLIC BLOOD PRESSURE: 118 MMHG | OXYGEN SATURATION: 95 % | DIASTOLIC BLOOD PRESSURE: 81 MMHG | HEIGHT: 74 IN | RESPIRATION RATE: 18 BRPM

## 2025-02-06 DIAGNOSIS — J45.20 MILD INTERMITTENT ASTHMA WITHOUT COMPLICATION: ICD-10-CM

## 2025-02-06 DIAGNOSIS — R05.8 OTHER COUGH: ICD-10-CM

## 2025-02-06 DIAGNOSIS — R09.81 NASAL CONGESTION: ICD-10-CM

## 2025-02-06 DIAGNOSIS — R11.0 NAUSEA: ICD-10-CM

## 2025-02-06 DIAGNOSIS — J10.1 INFLUENZA A: Primary | ICD-10-CM

## 2025-02-06 LAB
CTP QC/QA: YES
CTP QC/QA: YES
POC MOLECULAR INFLUENZA A AGN: POSITIVE
POC MOLECULAR INFLUENZA B AGN: NEGATIVE
SARS CORONAVIRUS 2 ANTIGEN: NEGATIVE

## 2025-02-06 PROCEDURE — 87811 SARS-COV-2 COVID19 W/OPTIC: CPT | Mod: QW,S$GLB,, | Performed by: PHYSICIAN ASSISTANT

## 2025-02-06 PROCEDURE — 99214 OFFICE O/P EST MOD 30 MIN: CPT | Mod: S$GLB,,, | Performed by: PHYSICIAN ASSISTANT

## 2025-02-06 PROCEDURE — 87502 INFLUENZA DNA AMP PROBE: CPT | Mod: QW,S$GLB,, | Performed by: PHYSICIAN ASSISTANT

## 2025-02-06 RX ORDER — FLUTICASONE PROPIONATE 50 MCG
2 SPRAY, SUSPENSION (ML) NASAL DAILY PRN
Qty: 16 G | Refills: 0 | Status: SHIPPED | OUTPATIENT
Start: 2025-02-06 | End: 2025-03-08

## 2025-02-06 RX ORDER — PROMETHAZINE HYDROCHLORIDE AND DEXTROMETHORPHAN HYDROBROMIDE 6.25; 15 MG/5ML; MG/5ML
5 SYRUP ORAL EVERY 6 HOURS PRN
Qty: 200 ML | Refills: 0 | Status: SHIPPED | OUTPATIENT
Start: 2025-02-06 | End: 2025-02-16

## 2025-02-06 RX ORDER — ONDANSETRON 4 MG/1
4 TABLET, ORALLY DISINTEGRATING ORAL EVERY 6 HOURS PRN
Qty: 30 TABLET | Refills: 0 | Status: SHIPPED | OUTPATIENT
Start: 2025-02-06 | End: 2025-02-16

## 2025-02-06 RX ORDER — PREDNISONE 20 MG/1
20 TABLET ORAL 2 TIMES DAILY
Qty: 10 TABLET | Refills: 0 | Status: SHIPPED | OUTPATIENT
Start: 2025-02-06 | End: 2025-02-11

## 2025-02-06 RX ORDER — ALBUTEROL SULFATE 90 UG/1
1-2 AEROSOL, METERED RESPIRATORY (INHALATION) EVERY 4 HOURS PRN
Qty: 18 G | Refills: 0 | Status: SHIPPED | OUTPATIENT
Start: 2025-02-06 | End: 2025-03-08

## 2025-02-06 RX ORDER — OSELTAMIVIR PHOSPHATE 75 MG/1
75 CAPSULE ORAL 2 TIMES DAILY
Qty: 10 CAPSULE | Refills: 0 | Status: SHIPPED | OUTPATIENT
Start: 2025-02-06 | End: 2025-02-11

## 2025-02-06 NOTE — LETTER
"  February 6, 2025      [facility] [address]  [place] [address]  Phone: [phone]  Fax: [phone]       Patient: Shekhar Snyder   YOB: 2000  Date of Visit: 02/06/2025    To Whom It May Concern:    Deyanira Snyder  was at [facility] on 02/06/2025. The patient may return to work/school on 2/8/25 with no restrictions.  Please excuse patient for missed absences due to illness. If you have any questions or concerns, or if I can be of further assistance, please do not hesitate to contact me.    Sincerely,      Jill Elias PA-C (Jackie)       "

## 2025-02-06 NOTE — PATIENT INSTRUCTIONS
Recommend oral antihistamine (claritin, zyrtec, allegra,xyzal),oral decongestant (pseudoephedrine), steroid nasal spray (flonase) , prescription (promethazine-DM) at night due to drowsiness  /OTC cough medicine (Dayquil/Mucinex DM 12 hour),  Tylenol (Acetaminophen) and/or Motrin (Ibuprofen) as directed for control of pain and/or fever.    The most common side effects of oseltamivir (TAMIFLU) are nausea and vomiting. Usually, nausea and vomiting are not severe and happen in the first 2 days of treatment. Taking Tamiflu with food may lessen the chance of getting these side effects. Other side effects include stomach (abdominal) pain, nosebleeds, headache, and feeling tired (fatigue).  Psychiatric effects such as anxiety, irritation, delirium, hallucinations, and nightmares have been reported.       Please drink plenty of fluids.  Please get plenty of rest.  Nasal irrigation with a saline spray or Netti Pot like device per their directions is also recommended.  If you  smoke, please stop smoking.    To help ease a sore throat, you can:  Use a sore throat spray.  Suck on hard candy or throat lozenges.  Gargle with warm saltwater a few times each day. Mix of 1/4 teaspoon (1.25 grams) salt in 8 ounces (240 mL) of warm water.  Use a cool mist humidifier to help you breathe easier.    If you negative (-) for a COVID test today and you are continuing to have symptoms, it is recommended to repeat the test in 48 hours x 3. If you continue to be negative, you may return to school/work once you have improved symptoms and no fever for 24 hours without any medications. This applies to all viral illnesses.       Discussed prescriptions and over-the-counter medicines to help with patient's symptoms:  A steroid nose spray (flonase) and antihistamine nasal spray (azelastine) can help with a stuffy nose. It can also help with drainage down the back of your throat.  An antihistamine (loratadine,zyrtec,allegra, xyzal) can help with  itching, sneezing, or runny nose.  An antihistamine eye drop can help with itchy eyes.  A decongestant (pseudoephedrine,  Phenylephrine, oxymetazoline aka afrin nasal spray) can help with a stuffy nose. Take <10 days for congestion and rhinorrhea. Once symptoms improve, proceed with loratadine/zyrtec once a day. These ingredients can keep you up all night, decrease appetite, feel jittery, and raise blood pressure with long term use.  OTC Coricidin can be used for patients with hypertension and palpitations because you cannot use ingredients such as pseudoephedrine and phenylephrine for oral decongestants.  Medications that control cough are suppressants and expectorants. Suppressants are tessalon pearls and dextromethorphan. If you have a productive cough with sputum, you need an expectorant called guaifenesin. Dextromethorphan and Guaifenesin are active ingredients in many OTC cough/cold medications such as Dayquil/Nyquil, Mucinex, and Robitussin Mucus+Chest Congestion.            Common Cold Medicine Ingredients Cheat sheet  Acetaminophen (APAP) -pain reliever/fever reducer  Dextromethorphan - cough suppressant  Guaifenesin - expectorant/thins and loosens mucus  Phenylephrine - nasal decongestant  Diphenhydramine or Doxylamine succinate - antihistamine, helps you fall asleep  Promethazine or Brompheniramine - Prescription strength antihistamines    These OTC cold medications are safe to use if you do not have high blood pressure (hypertension) or palpitations.  DayQuil and NyQuil - Cough, Cold & Flu Relief LiquiCaps  DayQuil: Acetaminophen, Dextromethorphan, and Phenylephrine   NyQuil: Acetaminophen, Dextromethorphan, and Doxylamine  DayQuil and NyQuil SEVERE Maximum Strength Cough, Cold & Flu Relief LiquiCaps   DAYQUIL: Acetaminophen, Dextromethorphan, Guaifenesin, and Phenylephrine  NYQUIL: Acetaminophen, Dextromethorphan, Doxylamine, and Phenylephrine   Mucinex DM: Guaifenesin,Dextromethorphan  Mucinex Maximum  Strength Sinus-Max® Pressure, Pain & Cough Liquid Gels: Acetaminophen/Dextromethorphan/ Guaifenesin/Phenylephrine     If not allergic, take Tylenol (Acetaminophen) 650 mg to  1 g every 6 hours as needed  and/or Motrin (Ibuprofen) 600 to 800 mg every 6 hours as needed for fever or pain.    Recommend Imodium or Pepto bismol for diarrhea.   Recommend Imodium (Initial: 4 mg, followed by 2 mg after each loose stool; maximum: 16 mg/day (OTC: 8 mg/day). Limit use to <=48 hours.   Pepto-Bismol - One common side effect is your stool or your tongue turning black. This is harmless.This happens when bismuth (the active ingredient in this medicine) comes into contact with small amounts of sulphur in your saliva and digestive system. They combine to form bismuth sulfide, a black substance. As it slowly makes its way out of your body you may see black stools.This side effect usually goes away when you stop taking the medicine but it may take several days.   Peptobismol: 524 mg (15 ml) every 30 to 60 minutes or 1,050 mg (30 ml) every 60 minutes as needed for up to 2 days      Please remember that you have received care at an urgent care today. Urgent cares are not emergency rooms and are not equipped to handle life threatening emergencies and cannot rule in or out certain medical conditions and you may be released before all of your medical problems are known or treated.     Please arrange follow up with your primary care physician or speciality clinic within 2-5 days if your signs and symptoms have not resolved or worsen.     Patient can call our Referral Hotline at [phone] to make an appointment.      Please return here or go to the Emergency Department for any concerns or worsening of condition.  Signs of infection. These include a fever of 100.4°F (38°C) or higher, chills, cough, more sputum or change in color of sputum.  You are having so much trouble breathing that you can only say one or two words at a time.  You need  to sit upright at all times to be able to breathe and or cannot lie down.  You have trouble breathing when talking or sitting still.  You have a fever of 100.4°F (38°C) or higher or chills.  You have chest pain when you cough, have trouble breathing but can still talk in full sentences, or cough up blood.

## 2025-02-06 NOTE — PROGRESS NOTES
"Subjective:      Patient ID: Shekhar Snyder is a 24 y.o. male.    Vitals:  height is 6' 2" (1.88 m) and weight is 64 kg (141 lb 1.5 oz). His oral temperature is 98.5 °F (36.9 °C). His blood pressure is 118/81 and his pulse is 110. His respiration is 18 and oxygen saturation is 95%.     Chief Complaint: Cough    Shekhar Snyder is a 24 y.o. male with asthma, ADHD, depression, and PTSD who complains of headaches, body aches, vomiting, diarrhea, cough, congestion. Sx started 3 days ago. Tx include OTC cold and fldu with no relief. His girlfriend has similar symptoms. Pt states he has shortness of breath going up stairs.  He is unable to eat anything due to nausea. He is not nauseous right now.     Cough  This is a new problem. The current episode started in the past 7 days. The problem has been gradually worsening. The problem occurs constantly. The cough is Productive of sputum. Associated symptoms include chills, a fever, headaches, myalgias, nasal congestion, postnasal drip, rhinorrhea, shortness of breath and wheezing. Pertinent negatives include no chest pain, ear congestion, ear pain, heartburn, hemoptysis, rash, sore throat, sweats or weight loss. Nothing aggravates the symptoms. He has tried OTC cough suppressant for the symptoms. The treatment provided mild relief. His past medical history is significant for asthma. There is no history of bronchiectasis, bronchitis, COPD, emphysema, environmental allergies or pneumonia.     Constitution: Positive for activity change, appetite change, chills, fatigue, fever and generalized weakness. Negative for sweating, unexpected weight change and international travel in last 60 days.   HENT:  Positive for congestion and postnasal drip. Negative for ear pain, ear discharge, foreign body in ear, tinnitus, sinus pain, sinus pressure, sore throat, trouble swallowing and voice change.    Cardiovascular:  Negative for chest pain, leg swelling, palpitations and sob on exertion. "   Respiratory:  Positive for cough, sputum production, shortness of breath, wheezing and asthma. Negative for sleep apnea, chest tightness and bloody sputum.    Gastrointestinal:  Positive for nausea, vomiting and diarrhea. Negative for abdominal pain and heartburn.   Musculoskeletal:  Positive for pain, back pain, pain with walking and muscle ache.   Skin:  Negative for rash.   Allergic/Immunologic: Positive for asthma. Negative for environmental allergies, seasonal allergies and food allergies.   Neurological:  Positive for headaches.      Objective:     Physical Exam   Constitutional: He is oriented to person, place, and time. He is cooperative. He appears ill. No distress.      Comments:Patient is awake and alert, sitting up in exam chair, speaking and answering in complete sentences     normalawake  HENT:   Head: Normocephalic and atraumatic.      Comments: Patient observed breathing through mouth, sniffling, and frequently clearing throat.    Ears:   Right Ear: Tympanic membrane, external ear and ear canal normal.   Left Ear: Tympanic membrane, external ear and ear canal normal.   Nose: Mucosal edema, rhinorrhea and congestion present.   Mouth/Throat: Uvula is midline, oropharynx is clear and moist and mucous membranes are normal. Mucous membranes are moist. No oropharyngeal exudate or posterior oropharyngeal erythema. Tonsils are 0 on the right. Tonsils are 0 on the left. No tonsillar exudate. Oropharynx is clear.      Comments:  postnasal discharge noted on the posterior pharyngeal wall    Eyes: Conjunctivae and lids are normal. Pupils are equal, round, and reactive to light. Lids are everted and swept, no foreign bodies found. Extraocular movement intact vision grossly intact gaze aligned appropriately   Neck: Neck supple.   Cardiovascular: Normal rate, regular rhythm, normal heart sounds and normal pulses.   Pulmonary/Chest: Effort normal and breath sounds normal. No respiratory distress. He has no  wheezes. He has no rhonchi. He has no rales.   Abdominal: Normal appearance.   Musculoskeletal: Normal range of motion.         General: Normal range of motion.      Cervical back: He exhibits no tenderness.   Lymphadenopathy:     He has no cervical adenopathy.   Neurological: He is alert and oriented to person, place, and time.   Skin: Skin is warm.   Psychiatric: His behavior is normal. Mood, judgment and thought content normal.   Nursing note and vitals reviewed.      Assessment:     1. Influenza A    2. Other cough    3. Nasal congestion    4. Mild intermittent asthma without complication    5. Nausea      Patient presents with clinical exam findings and history consistent with above.      On exam, patient is nontoxic appearing and vitals are stable.      Diagnostic testing results were reviewed and discussed with patient/guardian.   Tests ordered in clinic:  Results for orders placed or performed in visit on 02/06/25   POCT Influenza A/B MOLECULAR    Collection Time: 02/06/25 12:58 PM   Result Value Ref Range    POC Molecular Influenza A Ag Positive (A) Negative    POC Molecular Influenza B Ag Negative Negative     Acceptable Yes    SARS Coronavirus 2 Antigen, POCT Manual Read    Collection Time: 02/06/25  1:04 PM   Result Value Ref Range    SARS Coronavirus 2 Antigen Negative Negative, Presumptive Negative     Acceptable Yes      Previous progress notes/admissions/labs and medications were reviewed.      Plan:   Will give tamiflu due to hx of asthma.     Influenza A  -     oseltamivir (TAMIFLU) 75 MG capsule; Take 1 capsule (75 mg total) by mouth 2 (two) times daily. for 5 days  Dispense: 10 capsule; Refill: 0  -     Ambulatory referral/consult to Internal Medicine    Other cough  -     SARS Coronavirus 2 Antigen, POCT Manual Read  -     POCT Influenza A/B MOLECULAR  -     promethazine-dextromethorphan (PROMETHAZINE-DM) 6.25-15 mg/5 mL Syrp; Take 5 mLs by mouth every 6 (six) hours  "as needed (cough).  Dispense: 200 mL; Refill: 0    Nasal congestion  -     fluticasone propionate (FLONASE) 50 mcg/actuation nasal spray; 2 sprays (100 mcg total) by Each Nostril route daily as needed for Allergies or Rhinitis.  Dispense: 16 g; Refill: 0    Mild intermittent asthma without complication  -     predniSONE (DELTASONE) 20 MG tablet; Take 1 tablet (20 mg total) by mouth 2 (two) times daily. for 5 days  Dispense: 10 tablet; Refill: 0  -     albuterol (VENTOLIN HFA) 90 mcg/actuation inhaler; Inhale 1-2 puffs into the lungs every 4 (four) hours as needed for Wheezing or Shortness of Breath. Rescue  Dispense: 18 g; Refill: 0  -     inhalation spacing device; Use as directed for inhalation.  Dispense: 1 each; Refill: 0    Nausea  -     ondansetron (ZOFRAN-ODT) 4 MG TbDL; Take 1 tablet (4 mg total) by mouth every 6 (six) hours as needed (nausea/vomiting).  Dispense: 30 tablet; Refill: 0                    1) See orders for this visit as documented in the electronic medical record.  2) Symptomatic therapy suggested: use acetaminophen/ibuprofen every 6-8 hours prn pain or fever, push fluids.   3) Call or return to clinic prn if these symptoms worsen or fail to improve as anticipated.    Discussed results/diagnosis/plan with patient in clinic.  We had shared decision making for patient's treatment. Patient verbalized understanding and in agreement with current treatment plan.     Patient was instructed to return for re-evaluation with urgent care or PCP for continued outpatient workup and management if symptoms do not improve/worsening symptoms. Strict ED versus clinic precautions given in depth.    Discharge and follow-up instructions given verbally/printed with the patient who expressed understanding. The instructions and results are also [facility] on [facility].              Jill "Naa" COLLINS Elias          Patient Instructions   Recommend oral antihistamine (claritin, zyrtec, allegra,xyzal),oral decongestant " (pseudoephedrine), steroid nasal spray (flonase) , prescription (promethazine-DM) at night due to drowsiness  /OTC cough medicine (Dayquil/Mucinex DM 12 hour),  Tylenol (Acetaminophen) and/or Motrin (Ibuprofen) as directed for control of pain and/or fever.    The most common side effects of oseltamivir (TAMIFLU) are nausea and vomiting. Usually, nausea and vomiting are not severe and happen in the first 2 days of treatment. Taking Tamiflu with food may lessen the chance of getting these side effects. Other side effects include stomach (abdominal) pain, nosebleeds, headache, and feeling tired (fatigue).  Psychiatric effects such as anxiety, irritation, delirium, hallucinations, and nightmares have been reported.       Please drink plenty of fluids.  Please get plenty of rest.  Nasal irrigation with a saline spray or Netti Pot like device per their directions is also recommended.  If you  smoke, please stop smoking.    To help ease a sore throat, you can:  Use a sore throat spray.  Suck on hard candy or throat lozenges.  Gargle with warm saltwater a few times each day. Mix of 1/4 teaspoon (1.25 grams) salt in 8 ounces (240 mL) of warm water.  Use a cool mist humidifier to help you breathe easier.    If you negative (-) for a COVID test today and you are continuing to have symptoms, it is recommended to repeat the test in 48 hours x 3. If you continue to be negative, you may return to school/work once you have improved symptoms and no fever for 24 hours without any medications. This applies to all viral illnesses.       Discussed prescriptions and over-the-counter medicines to help with patient's symptoms:  A steroid nose spray (flonase) and antihistamine nasal spray (azelastine) can help with a stuffy nose. It can also help with drainage down the back of your throat.  An antihistamine (loratadine,zyrtec,allegra, xyzal) can help with itching, sneezing, or runny nose.  An antihistamine eye drop can help with itchy  eyes.  A decongestant (pseudoephedrine,  Phenylephrine, oxymetazoline aka afrin nasal spray) can help with a stuffy nose. Take <10 days for congestion and rhinorrhea. Once symptoms improve, proceed with loratadine/zyrtec once a day. These ingredients can keep you up all night, decrease appetite, feel jittery, and raise blood pressure with long term use.  OTC Coricidin can be used for patients with hypertension and palpitations because you cannot use ingredients such as pseudoephedrine and phenylephrine for oral decongestants.  Medications that control cough are suppressants and expectorants. Suppressants are tessalon pearls and dextromethorphan. If you have a productive cough with sputum, you need an expectorant called guaifenesin. Dextromethorphan and Guaifenesin are active ingredients in many OTC cough/cold medications such as Dayquil/Nyquil, Mucinex, and Robitussin Mucus+Chest Congestion.            Common Cold Medicine Ingredients Cheat sheet  Acetaminophen (APAP) -pain reliever/fever reducer  Dextromethorphan - cough suppressant  Guaifenesin - expectorant/thins and loosens mucus  Phenylephrine - nasal decongestant  Diphenhydramine or Doxylamine succinate - antihistamine, helps you fall asleep  Promethazine or Brompheniramine - Prescription strength antihistamines    These OTC cold medications are safe to use if you do not have high blood pressure (hypertension) or palpitations.  DayQuil and NyQuil - Cough, Cold & Flu Relief LiquiCaps  DayQuil: Acetaminophen, Dextromethorphan, and Phenylephrine   NyQuil: Acetaminophen, Dextromethorphan, and Doxylamine  DayQuil and NyQuil SEVERE Maximum Strength Cough, Cold & Flu Relief LiquiCaps   DAYQUIL: Acetaminophen, Dextromethorphan, Guaifenesin, and Phenylephrine  NYQUIL: Acetaminophen, Dextromethorphan, Doxylamine, and Phenylephrine   Mucinex DM: Guaifenesin,Dextromethorphan  Mucinex Maximum Strength Sinus-Max® Pressure, Pain & Cough Liquid Gels:  Acetaminophen/Dextromethorphan/ Guaifenesin/Phenylephrine     If not allergic, take Tylenol (Acetaminophen) 650 mg to  1 g every 6 hours as needed  and/or Motrin (Ibuprofen) 600 to 800 mg every 6 hours as needed for fever or pain.    Recommend Imodium or Pepto bismol for diarrhea.   Recommend Imodium (Initial: 4 mg, followed by 2 mg after each loose stool; maximum: 16 mg/day (OTC: 8 mg/day). Limit use to <=48 hours.   Pepto-Bismol - One common side effect is your stool or your tongue turning black. This is harmless.This happens when bismuth (the active ingredient in this medicine) comes into contact with small amounts of sulphur in your saliva and digestive system. They combine to form bismuth sulfide, a black substance. As it slowly makes its way out of your body you may see black stools.This side effect usually goes away when you stop taking the medicine but it may take several days.   Peptobismol: 524 mg (15 ml) every 30 to 60 minutes or 1,050 mg (30 ml) every 60 minutes as needed for up to 2 days      Please remember that you have received care at an urgent care today. Urgent cares are not emergency rooms and are not equipped to handle life threatening emergencies and cannot rule in or out certain medical conditions and you may be released before all of your medical problems are known or treated.     Please arrange follow up with your primary care physician or speciality clinic within 2-5 days if your signs and symptoms have not resolved or worsen.     Patient can call our Referral Hotline at [phone] to make an appointment.      Please return here or go to the Emergency Department for any concerns or worsening of condition.  Signs of infection. These include a fever of 100.4°F (38°C) or higher, chills, cough, more sputum or change in color of sputum.  You are having so much trouble breathing that you can only say one or two words at a time.  You need to sit upright at all times to be able to breathe and or  cannot lie down.  You have trouble breathing when talking or sitting still.  You have a fever of 100.4°F (38°C) or higher or chills.  You have chest pain when you cough, have trouble breathing but can still talk in full sentences, or cough up blood.

## 2025-03-10 ENCOUNTER — HOSPITAL ENCOUNTER (OUTPATIENT)
Facility: HOSPITAL | Age: 25
Discharge: HOME | End: 2025-03-12
Attending: STUDENT IN AN ORGANIZED HEALTH CARE EDUCATION/TRAINING PROGRAM | Admitting: FAMILY MEDICINE
Payer: COMMERCIAL

## 2025-03-10 DIAGNOSIS — I31.39 PERICARDIAL EFFUSION: ICD-10-CM

## 2025-03-10 DIAGNOSIS — I30.1 ACUTE VIRAL PERICARDITIS: Primary | ICD-10-CM

## 2025-03-10 DIAGNOSIS — R07.9 CHEST PAIN: ICD-10-CM

## 2025-03-10 DIAGNOSIS — I31.9 PERICARDITIS: ICD-10-CM

## 2025-03-10 LAB
ALBUMIN SERPL BCP-MCNC: 3.8 G/DL (ref 3.5–5.2)
ALP SERPL-CCNC: 63 U/L (ref 40–150)
ALT SERPL W/O P-5'-P-CCNC: 14 U/L (ref 10–44)
ANION GAP SERPL CALC-SCNC: 11 MMOL/L (ref 8–16)
AST SERPL-CCNC: 14 U/L (ref 10–40)
BASOPHILS # BLD AUTO: 0.04 K/UL (ref 0–0.2)
BASOPHILS NFR BLD: 0.4 % (ref 0–1.9)
BILIRUB SERPL-MCNC: 0.7 MG/DL (ref 0.1–1)
BNP SERPL-MCNC: 11 PG/ML (ref 0–99)
BUN SERPL-MCNC: 12 MG/DL (ref 6–20)
CALCIUM SERPL-MCNC: 8.9 MG/DL (ref 8.7–10.5)
CHLORIDE SERPL-SCNC: 106 MMOL/L (ref 95–110)
CO2 SERPL-SCNC: 21 MMOL/L (ref 23–29)
CREAT SERPL-MCNC: 0.8 MG/DL (ref 0.5–1.4)
D DIMER PPP IA.FEU-MCNC: 0.85 MG/L FEU
DIFFERENTIAL METHOD BLD: ABNORMAL
EOSINOPHIL # BLD AUTO: 0.3 K/UL (ref 0–0.5)
EOSINOPHIL NFR BLD: 2.6 % (ref 0–8)
ERYTHROCYTE [DISTWIDTH] IN BLOOD BY AUTOMATED COUNT: 13.1 % (ref 11.5–14.5)
EST. GFR  (NO RACE VARIABLE): >60 ML/MIN/1.73 M^2
GLUCOSE SERPL-MCNC: 99 MG/DL (ref 70–110)
HCT VFR BLD AUTO: 39.6 % (ref 40–54)
HGB BLD-MCNC: 13.1 G/DL (ref 14–18)
IMM GRANULOCYTES # BLD AUTO: 0.02 K/UL (ref 0–0.04)
IMM GRANULOCYTES NFR BLD AUTO: 0.2 % (ref 0–0.5)
LYMPHOCYTES # BLD AUTO: 2.6 K/UL (ref 1–4.8)
LYMPHOCYTES NFR BLD: 27.4 % (ref 18–48)
MCH RBC QN AUTO: 29.2 PG (ref 27–31)
MCHC RBC AUTO-ENTMCNC: 33.1 G/DL (ref 32–36)
MCV RBC AUTO: 88 FL (ref 82–98)
MONOCYTES # BLD AUTO: 1.2 K/UL (ref 0.3–1)
MONOCYTES NFR BLD: 12.2 % (ref 4–15)
NEUTROPHILS # BLD AUTO: 5.5 K/UL (ref 1.8–7.7)
NEUTROPHILS NFR BLD: 57.2 % (ref 38–73)
NRBC BLD-RTO: 0 /100 WBC
PLATELET # BLD AUTO: 195 K/UL (ref 150–450)
PMV BLD AUTO: 11.7 FL (ref 9.2–12.9)
POTASSIUM SERPL-SCNC: 3.9 MMOL/L (ref 3.5–5.1)
PROT SERPL-MCNC: 7.4 G/DL (ref 6–8.4)
RBC # BLD AUTO: 4.48 M/UL (ref 4.6–6.2)
SODIUM SERPL-SCNC: 138 MMOL/L (ref 136–145)
TROPONIN I SERPL DL<=0.01 NG/ML-MCNC: <0.006 NG/ML (ref 0–0.03)
WBC # BLD AUTO: 9.54 K/UL (ref 3.9–12.7)

## 2025-03-10 PROCEDURE — 83880 ASSAY OF NATRIURETIC PEPTIDE: CPT | Performed by: EMERGENCY MEDICINE

## 2025-03-10 PROCEDURE — 63600175 PHARM REV CODE 636 W HCPCS: Mod: JZ,TB | Performed by: STUDENT IN AN ORGANIZED HEALTH CARE EDUCATION/TRAINING PROGRAM

## 2025-03-10 PROCEDURE — G0378 HOSPITAL OBSERVATION PER HR: HCPCS

## 2025-03-10 PROCEDURE — 94640 AIRWAY INHALATION TREATMENT: CPT

## 2025-03-10 PROCEDURE — 84484 ASSAY OF TROPONIN QUANT: CPT | Performed by: EMERGENCY MEDICINE

## 2025-03-10 PROCEDURE — 80053 COMPREHEN METABOLIC PANEL: CPT | Performed by: EMERGENCY MEDICINE

## 2025-03-10 PROCEDURE — 85379 FIBRIN DEGRADATION QUANT: CPT | Performed by: EMERGENCY MEDICINE

## 2025-03-10 PROCEDURE — 94761 N-INVAS EAR/PLS OXIMETRY MLT: CPT

## 2025-03-10 PROCEDURE — 93010 ELECTROCARDIOGRAM REPORT: CPT | Mod: ,,, | Performed by: STUDENT IN AN ORGANIZED HEALTH CARE EDUCATION/TRAINING PROGRAM

## 2025-03-10 PROCEDURE — 99285 EMERGENCY DEPT VISIT HI MDM: CPT | Mod: 25

## 2025-03-10 PROCEDURE — 96374 THER/PROPH/DIAG INJ IV PUSH: CPT

## 2025-03-10 PROCEDURE — 93005 ELECTROCARDIOGRAM TRACING: CPT

## 2025-03-10 PROCEDURE — 25500020 PHARM REV CODE 255

## 2025-03-10 PROCEDURE — 85025 COMPLETE CBC W/AUTO DIFF WBC: CPT | Performed by: EMERGENCY MEDICINE

## 2025-03-10 PROCEDURE — 25000242 PHARM REV CODE 250 ALT 637 W/ HCPCS: Performed by: EMERGENCY MEDICINE

## 2025-03-10 RX ORDER — KETOROLAC TROMETHAMINE 30 MG/ML
15 INJECTION, SOLUTION INTRAMUSCULAR; INTRAVENOUS
Status: COMPLETED | OUTPATIENT
Start: 2025-03-10 | End: 2025-03-10

## 2025-03-10 RX ORDER — IPRATROPIUM BROMIDE AND ALBUTEROL SULFATE 2.5; .5 MG/3ML; MG/3ML
3 SOLUTION RESPIRATORY (INHALATION)
Status: COMPLETED | OUTPATIENT
Start: 2025-03-10 | End: 2025-03-10

## 2025-03-10 RX ADMIN — IPRATROPIUM BROMIDE AND ALBUTEROL SULFATE 3 ML: 2.5; .5 SOLUTION RESPIRATORY (INHALATION) at 05:03

## 2025-03-10 RX ADMIN — KETOROLAC TROMETHAMINE 15 MG: 30 INJECTION, SOLUTION INTRAMUSCULAR at 10:03

## 2025-03-10 RX ADMIN — IOHEXOL 100 ML: 350 INJECTION, SOLUTION INTRAVENOUS at 06:03

## 2025-03-11 PROBLEM — Z87.891 FORMER SMOKER: Status: ACTIVE | Noted: 2025-03-11

## 2025-03-11 PROBLEM — I31.39 PERICARDIAL EFFUSION: Status: ACTIVE | Noted: 2025-03-11

## 2025-03-11 PROBLEM — R07.9 CHEST PAIN: Status: ACTIVE | Noted: 2025-03-11

## 2025-03-11 LAB
ALBUMIN SERPL BCP-MCNC: 3.6 G/DL (ref 3.5–5.2)
ALP SERPL-CCNC: 62 U/L (ref 40–150)
ALT SERPL W/O P-5'-P-CCNC: 9 U/L (ref 10–44)
ANION GAP SERPL CALC-SCNC: 9 MMOL/L (ref 8–16)
APICAL FOUR CHAMBER EJECTION FRACTION: 68 %
APICAL TWO CHAMBER EJECTION FRACTION: 50 %
ASCENDING AORTA: 2.98 CM
AST SERPL-CCNC: 13 U/L (ref 10–40)
AV INDEX (PROSTH): 0.87
AV MEAN GRADIENT: 4 MMHG
AV PEAK GRADIENT: 8 MMHG
AV VALVE AREA BY VELOCITY RATIO: 3 CM²
AV VALVE AREA: 3 CM²
AV VELOCITY RATIO: 0.86
BASOPHILS # BLD AUTO: 0.05 K/UL (ref 0–0.2)
BASOPHILS NFR BLD: 0.6 % (ref 0–1.9)
BILIRUB SERPL-MCNC: 0.5 MG/DL (ref 0.1–1)
BSA FOR ECHO PROCEDURE: 1.83 M2
BUN SERPL-MCNC: 10 MG/DL (ref 6–20)
CALCIUM SERPL-MCNC: 9.3 MG/DL (ref 8.7–10.5)
CHLORIDE SERPL-SCNC: 107 MMOL/L (ref 95–110)
CO2 SERPL-SCNC: 23 MMOL/L (ref 23–29)
CREAT SERPL-MCNC: 0.8 MG/DL (ref 0.5–1.4)
CRP SERPL-MCNC: 98.7 MG/L (ref 0–3.19)
CV ECHO LV RWT: 0.42 CM
DIFFERENTIAL METHOD BLD: ABNORMAL
DOP CALC AO PEAK VEL: 1.4 M/S
DOP CALC AO VTI: 27.1 CM
DOP CALC LVOT AREA: 3.5 CM2
DOP CALC LVOT DIAMETER: 2.1 CM
DOP CALC LVOT PEAK VEL: 1.2 M/S
DOP CALC LVOT STROKE VOLUME: 81.4 CM3
DOP CALC MV VTI: 22.3 CM
DOP CALCLVOT PEAK VEL VTI: 23.5 CM
E WAVE DECELERATION TIME: 241 MSEC
E/A RATIO: 1.56
E/E' RATIO: 12 M/S
ECHO LV POSTERIOR WALL: 1 CM (ref 0.6–1.1)
EOSINOPHIL # BLD AUTO: 0.3 K/UL (ref 0–0.5)
EOSINOPHIL NFR BLD: 3.8 % (ref 0–8)
ERYTHROCYTE [DISTWIDTH] IN BLOOD BY AUTOMATED COUNT: 13 % (ref 11.5–14.5)
ERYTHROCYTE [SEDIMENTATION RATE] IN BLOOD BY PHOTOMETRIC METHOD: 28 MM/HR (ref 0–23)
EST. GFR  (NO RACE VARIABLE): >60 ML/MIN/1.73 M^2
FRACTIONAL SHORTENING: 33.3 % (ref 28–44)
GLUCOSE SERPL-MCNC: 89 MG/DL (ref 70–110)
HCT VFR BLD AUTO: 42.5 % (ref 40–54)
HGB BLD-MCNC: 13.8 G/DL (ref 14–18)
IMM GRANULOCYTES # BLD AUTO: 0.03 K/UL (ref 0–0.04)
IMM GRANULOCYTES NFR BLD AUTO: 0.3 % (ref 0–0.5)
INTERVENTRICULAR SEPTUM: 1 CM (ref 0.6–1.1)
IVC DIAMETER: 2.4 CM
LEFT ATRIUM AREA SYSTOLIC (APICAL 2 CHAMBER): 15.02 CM2
LEFT ATRIUM AREA SYSTOLIC (APICAL 4 CHAMBER): 14.55 CM2
LEFT ATRIUM VOLUME INDEX MOD: 20 ML/M2
LEFT ATRIUM VOLUME MOD: 37 ML
LEFT INTERNAL DIMENSION IN SYSTOLE: 3.2 CM (ref 2.1–4)
LEFT VENTRICLE DIASTOLIC VOLUME INDEX: 57.75 ML/M2
LEFT VENTRICLE DIASTOLIC VOLUME: 108 ML
LEFT VENTRICLE END DIASTOLIC VOLUME APICAL 2 CHAMBER: 88.52 ML
LEFT VENTRICLE END DIASTOLIC VOLUME APICAL 4 CHAMBER: 92.06 ML
LEFT VENTRICLE END SYSTOLIC VOLUME APICAL 2 CHAMBER: 36.26 ML
LEFT VENTRICLE END SYSTOLIC VOLUME APICAL 4 CHAMBER: 35.04 ML
LEFT VENTRICLE MASS INDEX: 91 G/M2
LEFT VENTRICLE SYSTOLIC VOLUME INDEX: 22.5 ML/M2
LEFT VENTRICLE SYSTOLIC VOLUME: 42 ML
LEFT VENTRICULAR INTERNAL DIMENSION IN DIASTOLE: 4.8 CM (ref 3.5–6)
LEFT VENTRICULAR MASS: 170.2 G
LV LATERAL E/E' RATIO: 13.3 M/S
LV SEPTAL E/E' RATIO: 11.8 M/S
LVED V (TEICH): 108.46 ML
LVES V (TEICH): 41.98 ML
LVOT MG: 2.9 MMHG
LVOT MV: 0.77 CM/S
LYMPHOCYTES # BLD AUTO: 2.3 K/UL (ref 1–4.8)
LYMPHOCYTES NFR BLD: 25.3 % (ref 18–48)
MCH RBC QN AUTO: 28.3 PG (ref 27–31)
MCHC RBC AUTO-ENTMCNC: 32.5 G/DL (ref 32–36)
MCV RBC AUTO: 87 FL (ref 82–98)
MONOCYTES # BLD AUTO: 1.1 K/UL (ref 0.3–1)
MONOCYTES NFR BLD: 11.9 % (ref 4–15)
MV A" WAVE DURATION": 137.01 MSEC
MV PEAK A VEL: 0.68 M/S
MV PEAK E VEL: 1.06 M/S
MV PEAK GRADIENT: 4 MMHG
MV STENOSIS PRESSURE HALF TIME: 70.01 MS
MV VALVE AREA BY CONTINUITY EQUATION: 3.65 CM2
MV VALVE AREA P 1/2 METHOD: 3.14 CM2
NEUTROPHILS # BLD AUTO: 5.2 K/UL (ref 1.8–7.7)
NEUTROPHILS NFR BLD: 58.1 % (ref 38–73)
NRBC BLD-RTO: 0 /100 WBC
OHS CV RV/LV RATIO: 0.85 CM
OHS LV EJECTION FRACTION SIMPSONS BIPLANE MOD: 59 %
OHS QRS DURATION: 80 MS
OHS QTC CALCULATION: 441 MS
PISA MRMAX VEL: 2.05 M/S
PISA TR MAX VEL: 2.5 M/S
PLATELET # BLD AUTO: 247 K/UL (ref 150–450)
PMV BLD AUTO: 11.5 FL (ref 9.2–12.9)
POTASSIUM SERPL-SCNC: 3.9 MMOL/L (ref 3.5–5.1)
PROT SERPL-MCNC: 7.3 G/DL (ref 6–8.4)
PULM VEIN S/D RATIO: 0.58
PV MV: 0.8 M/S
PV PEAK D VEL: 0.69 M/S
PV PEAK GRADIENT: 4 MMHG
PV PEAK S VEL: 0.4 M/S
PV PEAK VELOCITY: 0.99 M/S
RA PRESSURE ESTIMATED: 3 MMHG
RA VOL SYS: 31.82 ML
RBC # BLD AUTO: 4.87 M/UL (ref 4.6–6.2)
RIGHT ATRIAL AREA: 12.6 CM2
RIGHT ATRIUM VOLUME AREA LENGTH APICAL 4 CHAMBER: 30.58 ML
RIGHT VENTRICLE DIASTOLIC BASEL DIMENSION: 4.1 CM
RV TB RVSP: 6 MMHG
RV TISSUE DOPPLER FREE WALL SYSTOLIC VELOCITY 1 (APICAL 4 CHAMBER VIEW): 12.1 CM/S
SINUS: 3.17 CM
SODIUM SERPL-SCNC: 139 MMOL/L (ref 136–145)
STJ: 2.56 CM
TDI LATERAL: 0.08 M/S
TDI SEPTAL: 0.09 M/S
TDI: 0.09 M/S
TR MAX PG: 26 MMHG
TRICUSPID ANNULAR PLANE SYSTOLIC EXCURSION: 2.41 CM
TSH SERPL DL<=0.005 MIU/L-ACNC: 1.52 UIU/ML (ref 0.4–4)
TV REST PULMONARY ARTERY PRESSURE: 28 MMHG
WBC # BLD AUTO: 9.01 K/UL (ref 3.9–12.7)
Z-SCORE OF LEFT VENTRICULAR DIMENSION IN END DIASTOLE: -0.82
Z-SCORE OF LEFT VENTRICULAR DIMENSION IN END SYSTOLE: -0.04

## 2025-03-11 PROCEDURE — 85025 COMPLETE CBC W/AUTO DIFF WBC: CPT | Performed by: REGISTERED NURSE

## 2025-03-11 PROCEDURE — G0378 HOSPITAL OBSERVATION PER HR: HCPCS

## 2025-03-11 PROCEDURE — 99214 OFFICE O/P EST MOD 30 MIN: CPT | Mod: ,,, | Performed by: NURSE PRACTITIONER

## 2025-03-11 PROCEDURE — 86235 NUCLEAR ANTIGEN ANTIBODY: CPT | Performed by: FAMILY MEDICINE

## 2025-03-11 PROCEDURE — 93010 ELECTROCARDIOGRAM REPORT: CPT | Mod: ,,, | Performed by: INTERNAL MEDICINE

## 2025-03-11 PROCEDURE — 86038 ANTINUCLEAR ANTIBODIES: CPT | Performed by: FAMILY MEDICINE

## 2025-03-11 PROCEDURE — 85652 RBC SED RATE AUTOMATED: CPT | Performed by: REGISTERED NURSE

## 2025-03-11 PROCEDURE — 86141 C-REACTIVE PROTEIN HS: CPT | Performed by: REGISTERED NURSE

## 2025-03-11 PROCEDURE — 25000003 PHARM REV CODE 250: Performed by: REGISTERED NURSE

## 2025-03-11 PROCEDURE — 80053 COMPREHEN METABOLIC PANEL: CPT | Performed by: REGISTERED NURSE

## 2025-03-11 PROCEDURE — 84443 ASSAY THYROID STIM HORMONE: CPT | Performed by: REGISTERED NURSE

## 2025-03-11 PROCEDURE — 86039 ANTINUCLEAR ANTIBODIES (ANA): CPT | Performed by: FAMILY MEDICINE

## 2025-03-11 PROCEDURE — 93005 ELECTROCARDIOGRAM TRACING: CPT

## 2025-03-11 RX ORDER — COLCHICINE 0.6 MG/1
0.6 TABLET, FILM COATED ORAL DAILY
Status: DISCONTINUED | OUTPATIENT
Start: 2025-03-11 | End: 2025-03-12 | Stop reason: HOSPADM

## 2025-03-11 RX ORDER — ONDANSETRON HYDROCHLORIDE 2 MG/ML
4 INJECTION, SOLUTION INTRAVENOUS EVERY 8 HOURS PRN
Status: DISCONTINUED | OUTPATIENT
Start: 2025-03-11 | End: 2025-03-12 | Stop reason: HOSPADM

## 2025-03-11 RX ORDER — ALBUTEROL SULFATE 90 UG/1
2 INHALANT RESPIRATORY (INHALATION) EVERY 4 HOURS PRN
COMMUNITY

## 2025-03-11 RX ORDER — NALOXONE HCL 0.4 MG/ML
0.02 VIAL (ML) INJECTION
Status: DISCONTINUED | OUTPATIENT
Start: 2025-03-11 | End: 2025-03-12 | Stop reason: HOSPADM

## 2025-03-11 RX ORDER — INHALER,ASSIST DEVICE,LG MASK
SPACER (EA) MISCELLANEOUS
COMMUNITY
Start: 2025-02-06

## 2025-03-11 RX ORDER — SODIUM CHLORIDE 0.9 % (FLUSH) 0.9 %
10 SYRINGE (ML) INJECTION EVERY 12 HOURS PRN
Status: DISCONTINUED | OUTPATIENT
Start: 2025-03-11 | End: 2025-03-12 | Stop reason: HOSPADM

## 2025-03-11 RX ORDER — GLUCAGON 1 MG
1 KIT INJECTION
Status: DISCONTINUED | OUTPATIENT
Start: 2025-03-11 | End: 2025-03-12 | Stop reason: HOSPADM

## 2025-03-11 RX ORDER — IBUPROFEN 200 MG
24 TABLET ORAL
Status: DISCONTINUED | OUTPATIENT
Start: 2025-03-11 | End: 2025-03-12 | Stop reason: HOSPADM

## 2025-03-11 RX ORDER — IBUPROFEN 200 MG
16 TABLET ORAL
Status: DISCONTINUED | OUTPATIENT
Start: 2025-03-11 | End: 2025-03-12 | Stop reason: HOSPADM

## 2025-03-11 RX ORDER — IBUPROFEN 400 MG/1
800 TABLET ORAL EVERY 8 HOURS
Status: DISCONTINUED | OUTPATIENT
Start: 2025-03-11 | End: 2025-03-12 | Stop reason: HOSPADM

## 2025-03-11 RX ADMIN — IBUPROFEN 800 MG: 400 TABLET ORAL at 06:03

## 2025-03-11 RX ADMIN — IBUPROFEN 800 MG: 400 TABLET ORAL at 10:03

## 2025-03-11 RX ADMIN — COLCHICINE 0.6 MG: 0.6 TABLET ORAL at 08:03

## 2025-03-11 RX ADMIN — IBUPROFEN 800 MG: 400 TABLET ORAL at 05:03

## 2025-03-11 NOTE — HOSPITAL COURSE
03/11/2025 Per HPI   03/12/2025 CP improved. Hemodynamically stable. ESR/CRP elevated. Tolerating treatment well.

## 2025-03-11 NOTE — ED NOTES
Per admit team via secure chat, Agnes Kunz NP verbalized pt to be observation overnight and if no events then discharge tomorrow. Charge Trang made aware. Admit to floor remains in progress, transport requested.

## 2025-03-11 NOTE — ED NOTES
Pt. Is resting quietly without distress, visiting with family at bedside. Ate 100% of breakfast tray. Pt. States cardiology was here for consult and verbalizes understanding of plan of care.

## 2025-03-11 NOTE — PLAN OF CARE
03/11/25 1852   Admission   Initial VN Admission Questions Complete   Communication Issues? None   Shift   Virtual Nurse - Rounding Complete   Pain Management Interventions care clustered;diversional activity provided;pain management plan reviewed with patient/caregiver;quiet environment facilitated;relaxation techniques promoted   Virtual Nurse - Patient Verbalized Approval Of Camera Use;VN Rounding   Type of Frequent Check   Type Patient Rounds;Telemetry Monitoring   Safety/Activity   Patient Rounds bed in low position;ID band on;placement of personal items at bedside;bed wheels locked;call light in patient/parent reach;clutter free environment maintained;visualized patient   Safety Promotion/Fall Prevention assistive device/personal item within reach;bed alarm set;diversional activities provided;Fall Risk reviewed with patient/family;Fall Risk signage in place;family expresses understanding of fall risk and prevention;family to remain at bedside;high risk medications identified;instructed to call staff for mobility;medications reviewed;patient verbalized intentions of noncompliance;side rails raised x 2   Positioning   Body Position position changed independently;supine   Head of Bed (HOB) Positioning HOB elevated   Pain/Comfort/Sleep   Preferred Pain Scale number (Numeric Rating Pain Scale)   Cardiac   Cardiac/Telemetry Monitor On Yes   ECG   Rhythm normal sinus rhythm     VIRTUAL NURSE:  Cued into patient's room.  Permission received per patient to turn camera to view patient.  Mother at bedside.  Introduced as VN for day shift that will be working with floor nurse and nursing assistant.  Educated patient on VN's role in patient care and  VIP model.  Plan of care reviewed with patient.  Education per flowsheet.   Informed patient that staff will round on them every 2 hours but to use call light for any other needs they may have; informed of fall risk and fall precautions.  Patient verbalized understanding.   Call light within reach; bed siderails up x3.  Opportunity given for questions and questions answered.  Admission assessment questions answered.  Patient denies complaints or any needs at this time. Instructed to call for assistance.  Will cont to monitor and intervene as needed.    Labs, notes, orders, and careplan initiated.

## 2025-03-11 NOTE — SUBJECTIVE & OBJECTIVE
Past Medical History:   Diagnosis Date    ADHD (attention deficit hyperactivity disorder)     Depression     PTSD (post-traumatic stress disorder)        Past Surgical History:   Procedure Laterality Date    ADENOIDECTOMY      CIRCUMCISION      OPEN REDUCTION AND INTERNAL FIXATION (ORIF) OF FRACTURE OF CLAVICLE Left 9/20/2022    Procedure: ORIF, FRACTURE, CLAVICLE;  Surgeon: Satya Reynoso IV, MD;  Location: [facility];  Service: Orthopedics;  Laterality: Left;  Beach chair, padded darling stand (no arm krishnamurthy needed), Large C arm come in from head of bed; acumed clavicle system  zack notified 9/19 @ [facility]    TONSILLECTOMY      TYMPANOSTOMY TUBE PLACEMENT         Review of patient's allergies indicates:   Allergen Reactions    Omnicef [cefdinir] Edema       No current facility-administered medications on file prior to encounter.     Current Outpatient Medications on File Prior to Encounter   Medication Sig    albuterol (VENTOLIN HFA) 90 mcg/actuation inhaler Inhale 2 puffs into the lungs every 4 (four) hours as needed for Wheezing or Shortness of Breath. Rescue    aspirin (ECOTRIN) 81 MG EC tablet Take 1 tablet (81 mg total) by mouth once daily. (Patient not taking: Reported on 3/11/2025)    dexmethylphenidate (FOCALIN XR) 25 mg 24 hr capsule Take 50 mg by mouth once daily. Dx=F90.0  Fill on or after 9/20/2020 (Patient not taking: Reported on 2/6/2025)    dexmethylphenidate (FOCALIN) 10 MG tablet Take 2 tablets (20 mg total) by mouth every evening. Dx=F90.0 Fill on or after 9/20/2020 (Patient not taking: Reported on 2/6/2025)    gabapentin (NEURONTIN) 300 MG capsule Take 1 capsule (300 mg total) by mouth every evening. for 14 doses (Patient not taking: Reported on 3/11/2025)    OPTICHAMBER CHIQUITA LG MASK Spcr USE WITH INHALER    oxyCODONE (ROXICODONE) 5 MG immediate release tablet Take 1 tablet (5 mg total) by mouth every 6 (six) hours as needed for Pain. (Patient not taking: Reported on 2/6/2025)    [DISCONTINUED]  acetaminophen (TYLENOL) 325 MG tablet Take 325 mg by mouth every 6 (six) hours as needed for Pain. (Patient not taking: Reported on 2/6/2025)    [DISCONTINUED] albuterol (VENTOLIN HFA) 90 mcg/actuation inhaler Inhale 1-2 puffs into the lungs every 4 (four) hours as needed for Wheezing or Shortness of Breath. Rescue    [DISCONTINUED] dexmethylphenidate (FOCALIN XR) 25 mg 24 hr capsule Take 50 mg by mouth once daily. Dx=F90.0 Fill on or after 6/19/2020 (Patient not taking: Reported on 2/6/2025)    [DISCONTINUED] inhalation spacing device Use as directed for inhalation.     Family History       Problem Relation (Age of Onset)    ADD / ADHD Mother    Alcohol abuse Father    Asthma Brother    Bipolar disorder Maternal Uncle    Depression Mother    Drug abuse Father    Personality disorder Father    Physical abuse Paternal Grandmother    Oswald Brock sequence Brother          Tobacco Use    Smoking status: Every Day     Types: Cigarettes     Passive exposure: Never    Smokeless tobacco: Never   Substance and Sexual Activity    Alcohol use: No    Drug use: Yes     Types: Marijuana    Sexual activity: Never     Review of Systems   Constitutional: Negative. Negative for diaphoresis and fever.   HENT: Negative.     Eyes: Negative.    Cardiovascular:  Positive for chest pain. Negative for irregular heartbeat, leg swelling, near-syncope, orthopnea, palpitations, paroxysmal nocturnal dyspnea and syncope.   Respiratory: Negative.  Negative for cough.    Endocrine: Negative.    Hematologic/Lymphatic: Negative.    Gastrointestinal: Negative.  Negative for nausea and vomiting.   Genitourinary: Negative.    Neurological:  Negative for dizziness, focal weakness, light-headedness and weakness.   Allergic/Immunologic: Negative.      Objective:     Vital Signs (Most Recent):  Temp: 98.8 °F (37.1 °C) (03/11/25 0800)  Pulse: 89 (03/11/25 0800)  Resp: 18 (03/11/25 0800)  BP: 113/60 (03/11/25 0800)  SpO2: (!) 94 % (03/11/25 0800) Vital Signs  "(24h Range):  Temp:  [98.1 °F (36.7 °C)-98.8 °F (37.1 °C)] 98.8 °F (37.1 °C)  Pulse:  [] 89  Resp:  [17-24] 18  SpO2:  [91 %-99 %] 94 %  BP: ()/(52-77) 113/60     Weight: 64 kg (141 lb)  Body mass index is 18.1 kg/m².    SpO2: (!) 94 %       No intake or output data in the 24 hours ending 03/11/25 1146    Lines/Drains/Airways       Peripheral Intravenous Line  Duration                  Peripheral IV - Single Lumen 03/10/25 1736 20 G 1 in Left Antecubital <1 day                     Physical Exam  Constitutional:       General: He is not in acute distress.     Appearance: He is not diaphoretic.   HENT:      Head: Atraumatic.   Eyes:      General:         Right eye: No discharge.         Left eye: No discharge.   Cardiovascular:      Rate and Rhythm: Normal rate and regular rhythm.   Pulmonary:      Effort: Pulmonary effort is normal.      Breath sounds: Normal breath sounds.   Abdominal:      General: Bowel sounds are normal.      Palpations: Abdomen is soft.   Musculoskeletal:      Right lower leg: No edema.      Left lower leg: No edema.   Skin:     General: Skin is warm and dry.   Neurological:      Mental Status: He is alert and oriented to person, place, and time.          Significant Labs: BMP:   Recent Labs   Lab 03/10/25  1736   GLU 99      K 3.9      CO2 21*   BUN 12   CREATININE 0.8   CALCIUM 8.9   , CMP   Recent Labs   Lab 03/10/25  1736      K 3.9      CO2 21*   GLU 99   BUN 12   CREATININE 0.8   CALCIUM 8.9   PROT 7.4   ALBUMIN 3.8   BILITOT 0.7   ALKPHOS 63   AST 14   ALT 14   ANIONGAP 11   , CBC   Recent Labs   Lab 03/10/25  1736   WBC 9.54   HGB 13.1*   HCT 39.6*      , INR No results for input(s): "INR", "PROTIME" in the last 48 hours., Lipid Panel No results for input(s): "CHOL", "HDL", "LDLCALC", "TRIG", "CHOLHDL" in the last 48 hours., and Troponin No results for input(s): "TROPONINIHS" in the last 48 hours.    Significant Imaging: Echocardiogram: " "Transthoracic echo (TTE) complete (Cupid Only):   Results for orders placed or performed during the hospital encounter of 03/10/25   Echo   Result Value Ref Range    BSA 1.83 m2    Johnson's Biplane MOD Ejection Fraction 59 %    A2C EF 50 %    A4C EF 68 %    LVOT stroke volume 81.4 cm3    LVIDd 4.8 3.5 - 6.0 cm    LV Systolic Volume 42 mL    LV Systolic Volume Index 22.5 mL/m2    LVIDs 3.2 2.1 - 4.0 cm    LV ESV A2C 36.26 mL    LV Diastolic Volume 108 mL    LV ESV A4C 35.04 mL    LV Diastolic Volume Index 57.75 mL/m2    LV EDV A2C 88.807678869386543 mL    LV EDV A4C 92.06 mL    Left Ventricular End Systolic Volume by Teichholz Method 41.98 mL    Left Ventricular End Diastolic Volume by Teichholz Method 108.46 mL    IVS 1.0 0.6 - 1.1 cm    LVOT diameter 2.1 cm    LVOT area 3.5 cm2    FS 33.3 28 - 44 %    Left Ventricle Relative Wall Thickness 0.42 cm    PW 1.0 0.6 - 1.1 cm    LV mass 170.2 g    LV Mass Index 91.0 g/m2    MV Peak E Jaden 1.06 m/s    TDI LATERAL 0.08 m/s    TDI SEPTAL 0.09 m/s    E/E' ratio 12 m/s    MV Peak A Jaden 0.68 m/s    TR Max Jaden 2.5 m/s    E/A ratio 1.56     E wave deceleration time 241 msec    MV "A" wave duration [id number] msec    LV SEPTAL E/E' RATIO 11.8 m/s    LV LATERAL E/E' RATIO 13.3 m/s    PV Peak S Jaden 0.40 m/s    PV Peak D Jaden 0.69 m/s    Pulm vein S/D ratio 0.58     LVOT peak jaden 1.2 m/s    Left Ventricular Outflow Tract Mean Velocity 0.77 cm/s    Left Ventricular Outflow Tract Mean Gradient 2.90 mmHg    RV- cai basal diam 4.1 cm    RV S' 12.10 cm/s    TAPSE 2.41 cm    RV/LV Ratio 0.85 cm    LA Vol (MOD) 37 mL    DENZEL (MOD) 20 mL/m2    RA area length vol 30.58 mL    RA Area 12.6 cm2    RA Vol 31.82 mL    AV mean gradient 4 mmHg    AV peak gradient 8 mmHg    Ao peak jaden 1.4 m/s    Ao VTI 27.1 cm    LVOT peak VTI 23.5 cm    AV valve area 3.0 cm²    AV Velocity Ratio 0.86     AV index (prosthetic) 0.87     SAM by Velocity Ratio 3.0 cm²    Mr max jaden 2.05 m/s    MV peak " gradient 4 mmHg    MV stenosis pressure 1/2 time 70.01 ms    MV valve area p 1/2 method 3.14 cm2    MV valve area by continuity eq 3.65 cm2    MV VTI 22.3 cm    Triscuspid Valve Regurgitation Peak Gradient 26 mmHg    PV PEAK VELOCITY 0.99 m/s    PV peak gradient 4 mmHg    Pulmonary Valve Mean Velocity 0.80 m/s    Sinus 3.17 cm    STJ 2.56 cm    Ascending aorta 2.98 cm    IVC diameter 2.40 cm    Mean e' 0.09 m/s    ZLVIDS -0.04     ZLVIDD -0.82     LA area A4C 14.55 cm2    LA area A2C 15.02 cm2    TV resting pulmonary artery pressure 28 mmHg    RV TB RVSP 6 mmHg    Est. RA pres 3 mmHg    Narrative      Left Ventricle: The left ventricle is normal in size. Normal wall   thickness. Normal wall motion. There is normal systolic function with a   visually estimated ejection fraction of 55 - 60%. Quantitated ejection   fraction is 59%. There is normal diastolic function.    Right Ventricle: The right ventricle has mild enlargement. Systolic   function is normal.    Left Atrium: Normal left atrial size.    Aortic Valve: The aortic valve is a trileaflet valve. There is no   significant regurgitation.    Mitral Valve: There is trace regurgitation.    Tricuspid Valve: There is trace regurgitation.    Pulmonary Artery: The estimated pulmonary artery systolic pressure is   28 mmHg.    IVC/SVC: Normal venous pressure at 3 mmHg.    Pericardium: There is a moderate effusion. No indication of cardiac   tamponade. Evidence includes no chamber collapse.

## 2025-03-11 NOTE — ED PROVIDER NOTES
"NAME:  Shekhar Snyder  CSN:     [id number]  MRN:    [id number]  ADMIT DATE: 3/10/2025        eMERGENCY dEPARTMENT eNCOUnter    CHIEF COMPLAINT    Chief Complaint   Patient presents with    Chest Pain     Reports " tightness in chest" and and sob x4 days. Mild cough, congestion. Describes as heaviness in chest. Exacerbated by exertion. Using an old inhaler with some relief.        HPI    Shekhar Snyder is a 24 y.o. male with a past medical history of  has a past medical history of ADHD (attention deficit hyperactivity disorder), Depression, and PTSD (post-traumatic stress disorder).       Patient is a 24 year old male presenting with 4 day hx of chest tightness, pain, and SOB. Says he has occasionally had similar symptoms in the past that last anywhere from 30 min to one day. Four days ago these symptoms started with associated lightheadedness, feeling "hot". Has an inhaler (no hx of asthma, occasionally gets from urgent care) that he says he uses to "catch his breath" but does not relieve symptoms. Pain, tightness, and shortness of breath worse with laying back, leaning forward, exertion, or breathing deeply. Chest pain occurs in middle of sternum with no radiation. Last sickness was 1 month ago, viral illness that lasted 1 week. Quit all nicotine 1 month ago, does smoke marijuana every day. No fever, chills, nausea, vomiting, abdominal pain, urinary symptoms, lower leg edema. No recent trauma. 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     HPI       PAST MEDICAL HISTORY  Past Medical History:   Diagnosis Date    ADHD (attention deficit hyperactivity disorder)     Depression     PTSD (post-traumatic stress disorder)        SURGICAL HISTORY    Past Surgical History:   Procedure Laterality Date    ADENOIDECTOMY      CIRCUMCISION      OPEN REDUCTION AND INTERNAL FIXATION (ORIF) OF FRACTURE OF CLAVICLE Left 9/20/2022    Procedure: ORIF, FRACTURE, CLAVICLE;  Surgeon: Satya Reynoso IV, MD;  " Location: [facility] OR;  Service: Orthopedics;  Laterality: Left;  Beach chair, padded darling stand (no arm krishnamurthy needed), Large C arm come in from head of bed; acumed clavicle system  dian notified 9/19 @ [facility] [facility]    TONSILLECTOMY      TYMPANOSTOMY TUBE PLACEMENT         FAMILY HISTORY    Family History   Problem Relation Name Age of Onset    Oswald Vinod sequence Brother Shekhar morillo     Asthma Brother Shekhar morillo     Bipolar disorder Maternal Uncle      Alcohol abuse Father      Drug abuse Father      Personality disorder Father      ADD / ADHD Mother      Depression Mother      Physical abuse Paternal Grandmother         SOCIAL HISTORY    Social History     Socioeconomic History    Marital status: Single   Tobacco Use    Smoking status: Every Day     Types: Cigarettes     Passive exposure: Never    Smokeless tobacco: Never   Substance and Sexual Activity    Alcohol use: No    Drug use: Yes     Types: Marijuana    Sexual activity: Never   Social History Narrative    Lives at home with Mom and Brother, Going to [employer], will be in 6th grade.  One dog at home.       MEDICATIONS  Current Outpatient Medications   Medication Instructions    acetaminophen (TYLENOL) 325 mg, Every 6 hours PRN    albuterol (VENTOLIN HFA) 90 mcg/actuation inhaler 1-2 puffs, Inhalation, Every 4 hours PRN, Rescue    aspirin (ECOTRIN) 81 mg, Oral, Daily    dexmethylphenidate (FOCALIN XR) 50 mg, Oral, Daily, Dx=F90.0 Fill on or after 6/19/2020    dexmethylphenidate (FOCALIN XR) 50 mg, Oral, Daily, Dx=F90.0  Fill on or after 9/20/2020    dexmethylphenidate (FOCALIN) 20 mg, Oral, Nightly, Dx=F90.0 Fill on or after 9/20/2020    gabapentin (NEURONTIN) 300 mg, Oral, Nightly    inhalation spacing device Use as directed for inhalation.    oxyCODONE (ROXICODONE) 5 mg, Oral, Every 6 hours PRN       ALLERGIES    Review of patient's allergies indicates:   Allergen Reactions    Omnicef [cefdinir] Edema         REVIEW OF SYSTEMS   Review of  Systems       PHYSICAL EXAM    Reviewed Triage Note    VITAL SIGNS:   ED Triage Vitals [03/10/25 1704]   Encounter Vitals Group      BP       Systolic BP Percentile       Diastolic BP Percentile       Pulse 85      Resp 18      Temp       Temp src       SpO2 98 %      Weight       Height       Head Circumference       Peak Flow       Pain Score       Pain Loc       Pain Education       Exclude from Growth Chart        Patient Vitals for the past 24 hrs:   BP Pulse Resp SpO2   03/10/25 2145 -- 89 (!) 23 98 %   03/10/25 2140 129/77 85 (!) 24 99 %   03/10/25 1704 -- 85 18 98 %       Physical Exam    Nursing note and vitals reviewed.  Constitutional: He appears well-developed and well-nourished.   HENT:   Head: Normocephalic and atraumatic.   Eyes: EOM are normal. Pupils are equal, round, and reactive to light.   Neck: Neck supple.   Normal range of motion.  Cardiovascular:  Normal rate, regular rhythm and normal heart sounds.           Pulmonary/Chest: Breath sounds normal. No respiratory distress. He exhibits tenderness (L parasternal at level of 3 intercostal space).   Abdominal: Abdomen is soft. There is no abdominal tenderness.   Musculoskeletal:         General: Normal range of motion.      Cervical back: Normal range of motion and neck supple.     Neurological: He is alert and oriented to person, place, and time.   Skin: Skin is warm and dry.   Psychiatric: He has a normal mood and affect.            EKG     Interpreted by EM physician if performed:   Sinus tachycardia at 114. HI depression in 2, 3, AVF with ST elevation noted in V5, V6.  T-wave inversion in V2.            LABS  Pertinent labs reviewed. (See chart for details)   Labs Reviewed   CBC W/ AUTO DIFFERENTIAL - Abnormal       Result Value    WBC 9.54      RBC 4.48 (*)     Hemoglobin 13.1 (*)     Hematocrit 39.6 (*)     MCV 88      MCH 29.2      MCHC 33.1      RDW 13.1      Platelets 195      MPV 11.7      Immature Granulocytes 0.2      Gran # (ANC) 5.5       Immature Grans (Abs) 0.02      Lymph # 2.6      Mono # 1.2 (*)     Eos # 0.3      Baso # 0.04      nRBC 0      Gran % 57.2      Lymph % 27.4      Mono % 12.2      Eosinophil % 2.6      Basophil % 0.4      Differential Method Automated     COMPREHENSIVE METABOLIC PANEL - Abnormal    Sodium 138      Potassium 3.9      Chloride 106      CO2 21 (*)     Glucose 99      BUN 12      Creatinine 0.8      Calcium 8.9      Total Protein 7.4      Albumin 3.8      Total Bilirubin 0.7      Alkaline Phosphatase 63      AST 14      ALT 14      eGFR >60      Anion Gap 11     D DIMER, QUANTITATIVE - Abnormal    D-Dimer 0.85 (*)    TROPONIN I    Troponin I <0.006     B-TYPE NATRIURETIC PEPTIDE    BNP 11           RADIOLOGY          Imaging Results              CTA Chest Non-Coronary (PE Studies) (Final result)  Result time 03/10/25 19:39:53      Final result by Ronaldo Valadez DO (03/10/25 19:39:53)                   Impression:      No pulmonary embolism to the segmental level.    Moderate pericardial effusion.      Electronically signed by: Ronaldo Valadez  Date:    03/10/2025  Time:    19:39               Narrative:    EXAMINATION:  CTA CHEST NON CORONARY (PE STUDIES)    CLINICAL HISTORY:  Pulmonary embolism (PE) suspected, positive D-dimer;    TECHNIQUE:  Low dose axial images, sagittal and coronal reformations were obtained from the thoracic inlet to the lung bases following the IV administration of 100 mL of Omnipaque 350.  Contrast timing was optimized to evaluate the pulmonary arteries.  Maximum intensity projection images were provided for review.    COMPARISON:  Chest radiograph from earlier the same date.    FINDINGS:  Pulmonary vasculature: Satisfactory opacification of the pulmonary arterial system with no filling defect to the segmental level.    Aorta: Left-sided aortic arch.  No aneurysm and no significant atherosclerosis.    Base of Neck: No significant abnormality.    Thoracic soft tissues: Normal.    Heart:  Normal size.  There is a moderate pericardial effusion.    Melissa/Mediastinum: No pathologic keegan enlargement.    Airways: The large airways are patent. No foci of endobronchial filling.    Lungs/Pleura: Bandlike opacities in the left lower lobe, compatible with subsegmental atelectasis or scarring.  The lungs are otherwise clear.  No pleural effusion or thickening.    Esophagus: Normal.    Upper Abdomen: No abnormality of the partially imaged upper abdomen.    Bones: No acute fracture. No suspicious lytic or sclerotic lesions.  Left clavicle hardware partially imaged.                                       X-Ray Chest AP Portable (Final result)  Result time 03/10/25 17:06:48      Final result by Garo Regalado IV, MD (03/10/25 17:06:48)                   Impression:      As above.      Electronically signed by: Garo Regalado  Date:    03/10/2025  Time:    17:06               Narrative:    EXAMINATION:  XR CHEST AP PORTABLE    CLINICAL HISTORY:  Chest Pain;    TECHNIQUE:  Single frontal view of the chest was performed.    COMPARISON:  Radiograph 04/24/2024    FINDINGS:  Mediastinal structures are midline.  Normal mediastinal and hilar contours.  Normal size cardiac silhouette.  Lungs are symmetrically expanded and well aerated.  No focal consolidation.  No pneumothorax.  No significant pleural fluid.  Osseous structures appear intact noting hardware in left clavicle.                                        PROCEDURES    Procedures      ED COURSE & MEDICAL DECISION MAKING    Pertinent Labs & Imaging studies reviewed. (See chart for details and specific orders.)          Summary of review of records:   Seen at urgent care on February 6 and diagnosed with influenza a treated with Tamiflu and prednisone.    Medical Decision Making  Amount and/or Complexity of Data Reviewed  External Data Reviewed: labs and notes.  Labs: ordered. Decision-making details documented in ED Course.  Radiology: ordered and independent  interpretation performed. Decision-making details documented in ED Course.  ECG/medicine tests: ordered and independent interpretation performed. Decision-making details documented in ED Course.    Risk  Prescription drug management.      Shekhar Snyder is a 24 y.o. male who presents with 4 days of substernal chest pain with pleuritic symptoms.  Notes chest tightness.  Lab work and imaging ordered prior to my initial evaluation and notable for moderate size pericardial effusion.  EKG done at 4:04 p.m.  Suspect likely post viral pericarditis with associated pericardial effusions.          Medications   albuterol-ipratropium 2.5 mg-0.5 mg/3 mL nebulizer solution 3 mL (3 mLs Nebulization Given 3/10/25 1704)   iohexoL (OMNIPAQUE 350) injection 100 mL (100 mLs Intravenous Given 3/10/25 1852)   ketorolac injection 15 mg (15 mg Intravenous Given 3/10/25 2241)       ED Course as of 03/10/25 2326   Mon Mar 10, 2025   1807 CBC auto differential(!)  CBC relatively unremarkable.  No leukocytosis.  Hemoglobin slightly decreased to 13.1.  Platelet count within normal limits. [BJ]   1808 D-Dimer(!): 0.85  D-dimer elevated to 0.85.  CTA ordered. [BJ]   2217 D/w Dr.Barrientos Zhang, cardiology, agrees with plan of hospitalization, echo, and will be available for consultation.  [HL]      ED Course User Index  [BJ] Rola Walsh PA-C  [HL] Jennifer Henriquez,        [facility] is aware of patient with plan for hospitalization      FINAL IMPRESSION    Final diagnoses:  [R07.9] Chest pain  [I31.39] Pericardial effusion (Primary)       DISPOSITION  Patient admitted in stable condition             DISCLAIMER: This note was prepared with StyleChat by [employer] voice recognition transcription software. Garbled syntax, mangled pronouns, and other bizarre constructions may be attributed to that software system.             Jennifer Henriquez DO  03/10/25 2327

## 2025-03-11 NOTE — H&P
"[facility] Medicine  History & Physical    Patient Name: Shekhar Snyder  MRN: [id number]  Patient Class: OP- Observation  Admission Date: 3/10/2025  Attending Physician: Dean Singh III, MD   Primary Care Provider: Linda Myles MD         Patient information was obtained from patient and ER records.     Subjective:     Principal Problem:<principal problem not specified>    Chief Complaint:   Chief Complaint   Patient presents with    Chest Pain     Reports " tightness in chest" and and sob x4 days. Mild cough, congestion. Describes as heaviness in chest. Exacerbated by exertion. Using an old inhaler with some relief.         HPI: Patient is a 24-year-old male with history of ADHD presenting to ED with 4 days of midsternal chest tightness pain and shortness a breath.  Patient reports he has had similar symptoms in the past and short-lived around 30 minutes.  Patient reports 4 days ago he began with these aforementioned symptoms with additional light-headedness patient reports he used his inhaler but did not relieved his symptoms.  Patient states pain is worse with lying back , leaning forward, exertional, deep breathing.  Patient diagnosed with influenza a 1 month ago which is when he quit nicotine.  He does report THC use daily.  Patient denies cough, lower extremity swelling, no recent trauma.  In ED labwork remarkable for elevated D-dimer of 0.85.  Chest x-ray benign.  BNP and troponin negative.  CTA of the chest negative for PE however does show moderate size pericardial effusion, cardiology made aware of patient.  Patient with no JVD, muffled heart tones, nor urine pulse pressure.  No signs of tamponade.  Patient will admitted to Hospital Medicine obtain echo and consult Cardiology.    Past Medical History:   Diagnosis Date    ADHD (attention deficit hyperactivity disorder)     Depression     PTSD (post-traumatic stress disorder)        Past Surgical History:   Procedure " Laterality Date    ADENOIDECTOMY      CIRCUMCISION      OPEN REDUCTION AND INTERNAL FIXATION (ORIF) OF FRACTURE OF CLAVICLE Left 9/20/2022    Procedure: ORIF, FRACTURE, CLAVICLE;  Surgeon: Satya Reynoso IV, MD;  Location: [facility];  Service: Orthopedics;  Laterality: Left;  Beach chair, padded darling stand (no arm krishnamurthy needed), Large C arm come in from head of bed; acumed clavicle system  zack notified 9/19 @ [address] [facility]    TONSILLECTOMY      TYMPANOSTOMY TUBE PLACEMENT         Review of patient's allergies indicates:   Allergen Reactions    Omnicef [cefdinir] Edema       No current facility-administered medications on file prior to encounter.     Current Outpatient Medications on File Prior to Encounter   Medication Sig    acetaminophen (TYLENOL) 325 MG tablet Take 325 mg by mouth every 6 (six) hours as needed for Pain. (Patient not taking: Reported on 2/6/2025)    albuterol (VENTOLIN HFA) 90 mcg/actuation inhaler Inhale 1-2 puffs into the lungs every 4 (four) hours as needed for Wheezing or Shortness of Breath. Rescue    aspirin (ECOTRIN) 81 MG EC tablet Take 1 tablet (81 mg total) by mouth once daily.    dexmethylphenidate (FOCALIN XR) 25 mg 24 hr capsule Take 50 mg by mouth once daily. Dx=F90.0 Fill on or after 6/19/2020 (Patient not taking: Reported on 2/6/2025)    dexmethylphenidate (FOCALIN XR) 25 mg 24 hr capsule Take 50 mg by mouth once daily. Dx=F90.0  Fill on or after 9/20/2020 (Patient not taking: Reported on 2/6/2025)    dexmethylphenidate (FOCALIN) 10 MG tablet Take 2 tablets (20 mg total) by mouth every evening. Dx=F90.0 Fill on or after 9/20/2020 (Patient not taking: Reported on 2/6/2025)    gabapentin (NEURONTIN) 300 MG capsule Take 1 capsule (300 mg total) by mouth every evening. for 14 doses    inhalation spacing device Use as directed for inhalation.    oxyCODONE (ROXICODONE) 5 MG immediate release tablet Take 1 tablet (5 mg total) by mouth every 6 (six) hours as needed for Pain. (Patient not taking:  Reported on 2/6/2025)     Family History       Problem Relation (Age of Onset)    ADD / ADHD Mother    Alcohol abuse Father    Asthma Brother    Bipolar disorder Maternal Uncle    Depression Mother    Drug abuse Father    Personality disorder Father    Physical abuse Paternal Grandmother    Oswald Brock sequence Brother          Tobacco Use    Smoking status: Every Day     Types: Cigarettes     Passive exposure: Never    Smokeless tobacco: Never   Substance and Sexual Activity    Alcohol use: No    Drug use: Yes     Types: Marijuana    Sexual activity: Never     Review of Systems   Respiratory:  Positive for cough and shortness of breath.    Cardiovascular:  Positive for chest pain (Pleuritic chest pain with breathing).   All other systems reviewed and are negative.    Objective:     Vital Signs (Most Recent):  Temp: 98.1 °F (36.7 °C) (03/11/25 0415)  Pulse: 83 (03/11/25 0500)  Resp: (!) 23 (03/11/25 0500)  BP: (!) 106/58 (03/11/25 0500)  SpO2: (!) 91 % (03/11/25 0500) Vital Signs (24h Range):  Temp:  [98.1 °F (36.7 °C)-98.4 °F (36.9 °C)] 98.1 °F (36.7 °C)  Pulse:  [] 83  Resp:  [17-24] 23  SpO2:  [91 %-99 %] 91 %  BP: ()/(54-77) 106/58        There is no height or weight on file to calculate BMI.     Physical Exam  Vitals reviewed.   Constitutional:       Appearance: Normal appearance.   HENT:      Head: Normocephalic.      Nose: Nose normal.      Mouth/Throat:      Mouth: Mucous membranes are moist.      Pharynx: Oropharynx is clear.   Eyes:      Pupils: Pupils are equal, round, and reactive to light.   Cardiovascular:      Rate and Rhythm: Normal rate and regular rhythm.      Pulses: Normal pulses.      Heart sounds: Normal heart sounds.   Pulmonary:      Effort: Pulmonary effort is normal.      Breath sounds: Normal breath sounds.   Abdominal:      General: Bowel sounds are normal.      Palpations: Abdomen is soft.   Musculoskeletal:         General: Normal range of motion.      Cervical back: Normal  range of motion.   Skin:     General: Skin is warm and dry.      Capillary Refill: Capillary refill takes less than 2 seconds.   Neurological:      General: No focal deficit present.      Mental Status: He is alert and oriented to person, place, and time. Mental status is at baseline.              CRANIAL NERVES     CN III, IV, VI   Pupils are equal, round, and reactive to light.       Significant Labs: All pertinent labs within the past 24 hours have been reviewed.  Recent Lab Results         03/10/25  1736        Albumin 3.8       ALP 63       ALT 14       Anion Gap 11       AST 14       Baso # 0.04       Basophil % 0.4       BILIRUBIN TOTAL 0.7  Comment: For infants and newborns, interpretation of results should be based  on gestational age, weight and in agreement with clinical  observations.    Premature Infant recommended reference ranges:  Up to 24 hours.............<8.0 mg/dL  Up to 48 hours............<12.0 mg/dL  3-5 days..................<15.0 mg/dL  6-29 days.................<15.0 mg/dL         BNP 11  Comment: Values of less than 100 pg/ml are consistent with non-CHF populations.       BUN 12       Calcium 8.9       Chloride 106       CO2 21       Creatinine 0.8       D-Dimer 0.85  Comment: The quantitative D-dimer assay should be used as an aid in   the diagnosis of deep vein thrombosis and pulmonary embolism  in patients with the appropriate presentation and clinical  history. The upper limit of the reference interval and the clinical   cut off   point are identical. Causes of a positive (>0.50 mg/L FEU) D-Dimer   test  include, but are not limited to: DVT, PE, DIC, thrombolytic   therapy, anticoagulant therapy, recent surgery, trauma, or   pregnancy, disseminated malignancy, aortic aneurysm, cirrhosis,  and severe infection. False negative results may occur in   patients with distal DVT.  FRANNY^612^^40^  LOT^610^DDiSup^[id number]         Differential Method Automated       eGFR  >60       Eos # 0.3       Eos % 2.6       Glucose 99       Gran # (ANC) 5.5       Gran % 57.2       Hematocrit 39.6       Hemoglobin 13.1       Immature Grans (Abs) 0.02  Comment: Mild elevation in immature granulocytes is non specific and   can be seen in a variety of conditions including stress response,   acute inflammation, trauma and pregnancy. Correlation with other   laboratory and clinical findings is essential.         Immature Granulocytes 0.2       Lymph # 2.6       Lymph % 27.4       MCH 29.2       MCHC 33.1       MCV 88       Mono # 1.2       Mono % 12.2       MPV 11.7       nRBC 0       Platelet Count 195       Potassium 3.9       PROTEIN TOTAL 7.4       RBC 4.48       RDW 13.1       Sodium 138       Troponin I <0.006  Comment: The reference interval for Troponin I represents the 99th percentile   cutoff   for our facility and is consistent with 3rd generation assay   performance.         WBC 9.54               Significant Imaging: I have reviewed all pertinent imaging results/findings within the past 24 hours.  I have reviewed and interpreted all pertinent imaging results/findings within the past 24 hours.  Assessment/Plan:     Chest pain  Pleuritic type chest pain   BNP and troponin negative  Likely with postviral pericarditis.      Started on NSAIDs and colchicine.      Pericardial effusion  CTA chest ruled out pulmonary embolism however does show moderate pleural effusion  Likely secondary to postviral syndrome.    Consult placed to Cardiology  Await recs  Hemodynamically stable  Check TSH  Check ESR and CRP  Echo ordered      VTE Risk Mitigation (From admission, onward)           Ordered     IP VTE LOW RISK PATIENT  Once         03/11/25 0043     Place sequential compression device  Until discontinued         03/11/25 0043                         On 03/11/2025, patient should be placed in hospital observation services under my care in collaboration with Dr. Mims.           Kash Saldivar,  NP  Department of Hospital Medicine  [facility] - Emergency Dept

## 2025-03-11 NOTE — CONSULTS
[facility] - Emergency Dept  Cardiology  Consult Note    Patient Name: Shekhar Snyder  MRN: [id number]  Admission Date: 3/10/2025  Hospital Length of Stay: 0 days  Code Status: Full Code   Attending Provider: Karena Pritchett*   Consulting Provider: Eddie Rose NP  Primary Care Physician: Linda Myles MD  Principal Problem:Pericardial effusion    Patient information was obtained from patient and ER records.     Cardiology-[facility]  Consult performed by: Eddie Rose NP  Consult ordered by: Kash Saldivar NP      Inpatient consult to Cardiology  Consult performed by: Eddie Rose NP  Consult ordered by: Jennifer Henriquez DO        Subjective:     Chief Complaint:  Chest Pain      HPI:   24-year-old male with ADHD, THC use. Patient presented to the ED with 4 days of constant CP with associated SOB. Patient reports similar symptoms in the past which were short lived that he attributed to vaping.  Patient reports pain is worse with position changes and deep breathing. He had Flu A a month ago and at that time he stopped vaping.  He was noted to have an elevated D Dimer. CTA chest negative for PE. + pericardial effusion which was also noted on TTE without evidence of tamponade. Troponin negative. Patient denies diaphoresis, NV, cough, palpitations.   ESR, CRP pending.  Patient started on IBU and colchicine.     Past Medical History:   Diagnosis Date    ADHD (attention deficit hyperactivity disorder)     Depression     PTSD (post-traumatic stress disorder)        Past Surgical History:   Procedure Laterality Date    ADENOIDECTOMY      CIRCUMCISION      OPEN REDUCTION AND INTERNAL FIXATION (ORIF) OF FRACTURE OF CLAVICLE Left 9/20/2022    Procedure: ORIF, FRACTURE, CLAVICLE;  Surgeon: Satya Reynoso IV, MD;  Location: [facility] OR;  Service: Orthopedics;  Laterality: Left;  Beach chair, padded darling stand (no arm krishnamurthy needed), Large C arm come in from head of bed; acumed clavicle  system  zack notified 9/19 @ [address] [facility]    TONSILLECTOMY      TYMPANOSTOMY TUBE PLACEMENT         Review of patient's allergies indicates:   Allergen Reactions    Omnicef [cefdinir] Edema       No current facility-administered medications on file prior to encounter.     Current Outpatient Medications on File Prior to Encounter   Medication Sig    albuterol (VENTOLIN HFA) 90 mcg/actuation inhaler Inhale 2 puffs into the lungs every 4 (four) hours as needed for Wheezing or Shortness of Breath. Rescue    aspirin (ECOTRIN) 81 MG EC tablet Take 1 tablet (81 mg total) by mouth once daily. (Patient not taking: Reported on 3/11/2025)    dexmethylphenidate (FOCALIN XR) 25 mg 24 hr capsule Take 50 mg by mouth once daily. Dx=F90.0  Fill on or after 9/20/2020 (Patient not taking: Reported on 2/6/2025)    dexmethylphenidate (FOCALIN) 10 MG tablet Take 2 tablets (20 mg total) by mouth every evening. Dx=F90.0 Fill on or after 9/20/2020 (Patient not taking: Reported on 2/6/2025)    gabapentin (NEURONTIN) 300 MG capsule Take 1 capsule (300 mg total) by mouth every evening. for 14 doses (Patient not taking: Reported on 3/11/2025)    OPTICHAMBER CHIQUITA LG MASK Spcr USE WITH INHALER    oxyCODONE (ROXICODONE) 5 MG immediate release tablet Take 1 tablet (5 mg total) by mouth every 6 (six) hours as needed for Pain. (Patient not taking: Reported on 2/6/2025)    [DISCONTINUED] acetaminophen (TYLENOL) 325 MG tablet Take 325 mg by mouth every 6 (six) hours as needed for Pain. (Patient not taking: Reported on 2/6/2025)    [DISCONTINUED] albuterol (VENTOLIN HFA) 90 mcg/actuation inhaler Inhale 1-2 puffs into the lungs every 4 (four) hours as needed for Wheezing or Shortness of Breath. Rescue    [DISCONTINUED] dexmethylphenidate (FOCALIN XR) 25 mg 24 hr capsule Take 50 mg by mouth once daily. Dx=F90.0 Fill on or after 6/19/2020 (Patient not taking: Reported on 2/6/2025)    [DISCONTINUED] inhalation spacing device Use as directed for inhalation.      Family History       Problem Relation (Age of Onset)    ADD / ADHD Mother    Alcohol abuse Father    Asthma Brother    Bipolar disorder Maternal Uncle    Depression Mother    Drug abuse Father    Personality disorder Father    Physical abuse Paternal Grandmother    Oswald cleary Brother          Tobacco Use    Smoking status: Every Day     Types: Cigarettes     Passive exposure: Never    Smokeless tobacco: Never   Substance and Sexual Activity    Alcohol use: No    Drug use: Yes     Types: Marijuana    Sexual activity: Never     Review of Systems   Constitutional: Negative. Negative for diaphoresis and fever.   HENT: Negative.     Eyes: Negative.    Cardiovascular:  Positive for chest pain. Negative for irregular heartbeat, leg swelling, near-syncope, orthopnea, palpitations, paroxysmal nocturnal dyspnea and syncope.   Respiratory: Negative.  Negative for cough.    Endocrine: Negative.    Hematologic/Lymphatic: Negative.    Gastrointestinal: Negative.  Negative for nausea and vomiting.   Genitourinary: Negative.    Neurological:  Negative for dizziness, focal weakness, light-headedness and weakness.   Allergic/Immunologic: Negative.      Objective:     Vital Signs (Most Recent):  Temp: 98.8 °F (37.1 °C) (03/11/25 0800)  Pulse: 89 (03/11/25 0800)  Resp: 18 (03/11/25 0800)  BP: 113/60 (03/11/25 0800)  SpO2: (!) 94 % (03/11/25 0800) Vital Signs (24h Range):  Temp:  [98.1 °F (36.7 °C)-98.8 °F (37.1 °C)] 98.8 °F (37.1 °C)  Pulse:  [] 89  Resp:  [17-24] 18  SpO2:  [91 %-99 %] 94 %  BP: ()/(52-77) 113/60     Weight: 64 kg (141 lb)  Body mass index is 18.1 kg/m².    SpO2: (!) 94 %       No intake or output data in the 24 hours ending 03/11/25 1146    Lines/Drains/Airways       Peripheral Intravenous Line  Duration                  Peripheral IV - Single Lumen 03/10/25 1736 20 G 1 in Left Antecubital <1 day                     Physical Exam  Constitutional:       General: He is not in acute  "distress.     Appearance: He is not diaphoretic.   HENT:      Head: Atraumatic.   Eyes:      General:         Right eye: No discharge.         Left eye: No discharge.   Cardiovascular:      Rate and Rhythm: Normal rate and regular rhythm.   Pulmonary:      Effort: Pulmonary effort is normal.      Breath sounds: Normal breath sounds.   Abdominal:      General: Bowel sounds are normal.      Palpations: Abdomen is soft.   Musculoskeletal:      Right lower leg: No edema.      Left lower leg: No edema.   Skin:     General: Skin is warm and dry.   Neurological:      Mental Status: He is alert and oriented to person, place, and time.          Significant Labs: BMP:   Recent Labs   Lab 03/10/25  1736   GLU 99      K 3.9      CO2 21*   BUN 12   CREATININE 0.8   CALCIUM 8.9   , CMP   Recent Labs   Lab 03/10/25  1736      K 3.9      CO2 21*   GLU 99   BUN 12   CREATININE 0.8   CALCIUM 8.9   PROT 7.4   ALBUMIN 3.8   BILITOT 0.7   ALKPHOS 63   AST 14   ALT 14   ANIONGAP 11   , CBC   Recent Labs   Lab 03/10/25  1736   WBC 9.54   HGB 13.1*   HCT 39.6*      , INR No results for input(s): "INR", "PROTIME" in the last 48 hours., Lipid Panel No results for input(s): "CHOL", "HDL", "LDLCALC", "TRIG", "CHOLHDL" in the last 48 hours., and Troponin No results for input(s): "TROPONINIHS" in the last 48 hours.    Significant Imaging: Echocardiogram: Transthoracic echo (TTE) complete (Cupid Only):   Results for orders placed or performed during the hospital encounter of 03/10/25   Echo   Result Value Ref Range    BSA 1.83 m2    Johnson's Biplane MOD Ejection Fraction 59 %    A2C EF 50 %    A4C EF 68 %    LVOT stroke volume 81.4 cm3    LVIDd 4.8 3.5 - 6.0 cm    LV Systolic Volume 42 mL    LV Systolic Volume Index 22.5 mL/m2    LVIDs 3.2 2.1 - 4.0 cm    LV ESV A2C 36.26 mL    LV Diastolic Volume 108 mL    LV ESV A4C 35.04 mL    LV Diastolic Volume Index 57.75 mL/m2    LV EDV A2C 88.978278313313730 mL    LV EDV " "A4C 92.06 mL    Left Ventricular End Systolic Volume by Teichholz Method 41.98 mL    Left Ventricular End Diastolic Volume by Teichholz Method 108.46 mL    IVS 1.0 0.6 - 1.1 cm    LVOT diameter 2.1 cm    LVOT area 3.5 cm2    FS 33.3 28 - 44 %    Left Ventricle Relative Wall Thickness 0.42 cm    PW 1.0 0.6 - 1.1 cm    LV mass 170.2 g    LV Mass Index 91.0 g/m2    MV Peak E Jaden 1.06 m/s    TDI LATERAL 0.08 m/s    TDI SEPTAL 0.09 m/s    E/E' ratio 12 m/s    MV Peak A Jaden 0.68 m/s    TR Max Jaden 2.5 m/s    E/A ratio 1.56     E wave deceleration time 241 msec    MV "A" wave duration 137.267294981717133 msec    LV SEPTAL E/E' RATIO 11.8 m/s    LV LATERAL E/E' RATIO 13.3 m/s    PV Peak S Jaden 0.40 m/s    PV Peak D Jaden 0.69 m/s    Pulm vein S/D ratio 0.58     LVOT peak jaden 1.2 m/s    Left Ventricular Outflow Tract Mean Velocity 0.77 cm/s    Left Ventricular Outflow Tract Mean Gradient 2.90 mmHg    RV- cai basal diam 4.1 cm    RV S' 12.10 cm/s    TAPSE 2.41 cm    RV/LV Ratio 0.85 cm    LA Vol (MOD) 37 mL    DENZEL (MOD) 20 mL/m2    RA area length vol 30.58 mL    RA Area 12.6 cm2    RA Vol 31.82 mL    AV mean gradient 4 mmHg    AV peak gradient 8 mmHg    Ao peak jaden 1.4 m/s    Ao VTI 27.1 cm    LVOT peak VTI 23.5 cm    AV valve area 3.0 cm²    AV Velocity Ratio 0.86     AV index (prosthetic) 0.87     SAM by Velocity Ratio 3.0 cm²    Mr max jaden 2.05 m/s    MV peak gradient 4 mmHg    MV stenosis pressure 1/2 time 70.01 ms    MV valve area p 1/2 method 3.14 cm2    MV valve area by continuity eq 3.65 cm2    MV VTI 22.3 cm    Triscuspid Valve Regurgitation Peak Gradient 26 mmHg    PV PEAK VELOCITY 0.99 m/s    PV peak gradient 4 mmHg    Pulmonary Valve Mean Velocity 0.80 m/s    Sinus 3.17 cm    STJ 2.56 cm    Ascending aorta 2.98 cm    IVC diameter 2.40 cm    Mean e' 0.09 m/s    ZLVIDS -0.04     ZLVIDD -0.82     LA area A4C 14.55 cm2    LA area A2C 15.02 cm2    TV resting pulmonary artery pressure 28 mmHg    RV TB RVSP 6 mmHg    Est. " RA pres 3 mmHg    Narrative      Left Ventricle: The left ventricle is normal in size. Normal wall   thickness. Normal wall motion. There is normal systolic function with a   visually estimated ejection fraction of 55 - 60%. Quantitated ejection   fraction is 59%. There is normal diastolic function.    Right Ventricle: The right ventricle has mild enlargement. Systolic   function is normal.    Left Atrium: Normal left atrial size.    Aortic Valve: The aortic valve is a trileaflet valve. There is no   significant regurgitation.    Mitral Valve: There is trace regurgitation.    Tricuspid Valve: There is trace regurgitation.    Pulmonary Artery: The estimated pulmonary artery systolic pressure is   28 mmHg.    IVC/SVC: Normal venous pressure at 3 mmHg.    Pericardium: There is a moderate effusion. No indication of cardiac   tamponade. Evidence includes no chamber collapse.       Assessment and Plan:     * Pericardial effusion  No tamponade on TTE  No clinical signs of tamponade  Hemodynamically stable  Recent Flu A  No indication for pericardiocentesis   Follow up with Dr Kelly per patient's preference     Chest pain  Symptoms cw pericarditis - noted treatment initiated  Repeat EKG  Symptoms improving          VTE Risk Mitigation (From admission, onward)           Ordered     IP VTE LOW RISK PATIENT  Once         03/11/25 0043     Place sequential compression device  Until discontinued         03/11/25 0043                    Thank you for your consult. I will follow-up with patient. Please contact us if you have any additional questions.    Eddie Rose NP  Cardiology   [facility] - Emergency Dept

## 2025-03-11 NOTE — SUBJECTIVE & OBJECTIVE
Past Medical History:   Diagnosis Date    ADHD (attention deficit hyperactivity disorder)     Depression     PTSD (post-traumatic stress disorder)        Past Surgical History:   Procedure Laterality Date    ADENOIDECTOMY      CIRCUMCISION      OPEN REDUCTION AND INTERNAL FIXATION (ORIF) OF FRACTURE OF CLAVICLE Left 9/20/2022    Procedure: ORIF, FRACTURE, CLAVICLE;  Surgeon: Satya Reynoso IV, MD;  Location: [facility];  Service: Orthopedics;  Laterality: Left;  Beach chair, padded darling stand (no arm krishnamurthy needed), Large C arm come in from head of bed; acumed clavicle system  herlindajakob notified 9/19 @ 1015 [facility]    TONSILLECTOMY      TYMPANOSTOMY TUBE PLACEMENT         Review of patient's allergies indicates:   Allergen Reactions    Omnicef [cefdinir] Edema       No current facility-administered medications on file prior to encounter.     Current Outpatient Medications on File Prior to Encounter   Medication Sig    acetaminophen (TYLENOL) 325 MG tablet Take 325 mg by mouth every 6 (six) hours as needed for Pain. (Patient not taking: Reported on 2/6/2025)    albuterol (VENTOLIN HFA) 90 mcg/actuation inhaler Inhale 1-2 puffs into the lungs every 4 (four) hours as needed for Wheezing or Shortness of Breath. Rescue    aspirin (ECOTRIN) 81 MG EC tablet Take 1 tablet (81 mg total) by mouth once daily.    dexmethylphenidate (FOCALIN XR) 25 mg 24 hr capsule Take 50 mg by mouth once daily. Dx=F90.0 Fill on or after 6/19/2020 (Patient not taking: Reported on 2/6/2025)    dexmethylphenidate (FOCALIN XR) 25 mg 24 hr capsule Take 50 mg by mouth once daily. Dx=F90.0  Fill on or after 9/20/2020 (Patient not taking: Reported on 2/6/2025)    dexmethylphenidate (FOCALIN) 10 MG tablet Take 2 tablets (20 mg total) by mouth every evening. Dx=F90.0 Fill on or after 9/20/2020 (Patient not taking: Reported on 2/6/2025)    gabapentin (NEURONTIN) 300 MG capsule Take 1 capsule (300 mg total) by mouth every evening. for 14 doses    inhalation spacing  device Use as directed for inhalation.    oxyCODONE (ROXICODONE) 5 MG immediate release tablet Take 1 tablet (5 mg total) by mouth every 6 (six) hours as needed for Pain. (Patient not taking: Reported on 2/6/2025)     Family History       Problem Relation (Age of Onset)    ADD / ADHD Mother    Alcohol abuse Father    Asthma Brother    Bipolar disorder Maternal Uncle    Depression Mother    Drug abuse Father    Personality disorder Father    Physical abuse Paternal Grandmother    Oswald Brock sequence Brother          Tobacco Use    Smoking status: Every Day     Types: Cigarettes     Passive exposure: Never    Smokeless tobacco: Never   Substance and Sexual Activity    Alcohol use: No    Drug use: Yes     Types: Marijuana    Sexual activity: Never     Review of Systems   Respiratory:  Positive for cough and shortness of breath.    Cardiovascular:  Positive for chest pain (Pleuritic chest pain with breathing).   All other systems reviewed and are negative.    Objective:     Vital Signs (Most Recent):  Temp: 98.1 °F (36.7 °C) (03/11/25 0415)  Pulse: 83 (03/11/25 0500)  Resp: (!) 23 (03/11/25 0500)  BP: (!) 106/58 (03/11/25 0500)  SpO2: (!) 91 % (03/11/25 0500) Vital Signs (24h Range):  Temp:  [98.1 °F (36.7 °C)-98.4 °F (36.9 °C)] 98.1 °F (36.7 °C)  Pulse:  [] 83  Resp:  [17-24] 23  SpO2:  [91 %-99 %] 91 %  BP: ()/(54-77) 106/58        There is no height or weight on file to calculate BMI.     Physical Exam  Vitals reviewed.   Constitutional:       Appearance: Normal appearance.   HENT:      Head: Normocephalic.      Nose: Nose normal.      Mouth/Throat:      Mouth: Mucous membranes are moist.      Pharynx: Oropharynx is clear.   Eyes:      Pupils: Pupils are equal, round, and reactive to light.   Cardiovascular:      Rate and Rhythm: Normal rate and regular rhythm.      Pulses: Normal pulses.      Heart sounds: Normal heart sounds.   Pulmonary:      Effort: Pulmonary effort is normal.      Breath sounds:  Normal breath sounds.   Abdominal:      General: Bowel sounds are normal.      Palpations: Abdomen is soft.   Musculoskeletal:         General: Normal range of motion.      Cervical back: Normal range of motion.   Skin:     General: Skin is warm and dry.      Capillary Refill: Capillary refill takes less than 2 seconds.   Neurological:      General: No focal deficit present.      Mental Status: He is alert and oriented to person, place, and time. Mental status is at baseline.              CRANIAL NERVES     CN III, IV, VI   Pupils are equal, round, and reactive to light.       Significant Labs: All pertinent labs within the past 24 hours have been reviewed.  Recent Lab Results         03/10/25  1736        Albumin 3.8       ALP 63       ALT 14       Anion Gap 11       AST 14       Baso # 0.04       Basophil % 0.4       BILIRUBIN TOTAL 0.7  Comment: For infants and newborns, interpretation of results should be based  on gestational age, weight and in agreement with clinical  observations.    Premature Infant recommended reference ranges:  Up to 24 hours.............<8.0 mg/dL  Up to 48 hours............<12.0 mg/dL  3-5 days..................<15.0 mg/dL  6-29 days.................<15.0 mg/dL         BNP 11  Comment: Values of less than 100 pg/ml are consistent with non-CHF populations.       BUN 12       Calcium 8.9       Chloride 106       CO2 21       Creatinine 0.8       D-Dimer 0.85  Comment: The quantitative D-dimer assay should be used as an aid in   the diagnosis of deep vein thrombosis and pulmonary embolism  in patients with the appropriate presentation and clinical  history. The upper limit of the reference interval and the clinical   cut off   point are identical. Causes of a positive (>0.50 mg/L FEU) D-Dimer   test  include, but are not limited to: DVT, PE, DIC, thrombolytic   therapy, anticoagulant therapy, recent surgery, trauma, or   pregnancy, disseminated malignancy, aortic aneurysm, cirrhosis,  and  severe infection. False negative results may occur in   patients with distal DVT.  FRANNY^612^^40^  LOT^[id number]         Differential Method Automated       eGFR >60       Eos # 0.3       Eos % 2.6       Glucose 99       Gran # (ANC) 5.5       Gran % 57.2       Hematocrit 39.6       Hemoglobin 13.1       Immature Grans (Abs) 0.02  Comment: Mild elevation in immature granulocytes is non specific and   can be seen in a variety of conditions including stress response,   acute inflammation, trauma and pregnancy. Correlation with other   laboratory and clinical findings is essential.         Immature Granulocytes 0.2       Lymph # 2.6       Lymph % 27.4       MCH 29.2       MCHC 33.1       MCV 88       Mono # 1.2       Mono % 12.2       MPV 11.7       nRBC 0       Platelet Count 195       Potassium 3.9       PROTEIN TOTAL 7.4       RBC 4.48       RDW 13.1       Sodium 138       Troponin I <0.006  Comment: The reference interval for Troponin I represents the 99th percentile   cutoff   for our facility and is consistent with 3rd generation assay   performance.         WBC 9.54               Significant Imaging: I have reviewed all pertinent imaging results/findings within the past 24 hours.  I have reviewed and interpreted all pertinent imaging results/findings within the past 24 hours.

## 2025-03-11 NOTE — ASSESSMENT & PLAN NOTE
Pleuritic type chest pain   BNP and troponin negative  Likely with postviral pericarditis.      Started on NSAIDs and colchicine.

## 2025-03-11 NOTE — MEDICAL/APP STUDENT
"Chief Complaint   Patient presents with    Chest Pain     Reports " tightness in chest" and and sob x4 days. Mild cough, congestion. Describes as heaviness in chest. Exacerbated by exertion. Using an old inhaler with some relief.      HPI  Mr. Snyder is a 24-year-old male with a past medical history of ADHD, Depression and PTSD who presents to the ED for evaluation of a 4 day history of chest tightness and SOB. He describes it as a dull, non-radiating tightness. He has had episodes of similar symptoms in the past but none that have lasting more than a day. He has associated shortness of breath and lightheadedness, but denies fevers, chills, sweating, palpitations, nausea, vomiting, cough, headaches, or dizziness. He has tried ibuprofen with some relief of his chest pain. It is worse when lying down. He uses an albuterol inhaler that he has gotten from urgent care for shortness of breath while climbing stairs (not formally diagnosed with asthma). This helped with the SOB, but not the chest pain. The chest pain is a 8/10 at its worst and currently a 4/10. He states having had the flu 1 month ago, but has had no recent sick contacts. He stopped smoking a month ago and uses marijuana occasionally.     Past Medical History:   Diagnosis Date    ADHD (attention deficit hyperactivity disorder)     Depression     PTSD (post-traumatic stress disorder)      Past Surgical History:   Procedure Laterality Date    ADENOIDECTOMY      CIRCUMCISION      OPEN REDUCTION AND INTERNAL FIXATION (ORIF) OF FRACTURE OF CLAVICLE Left 9/20/2022    Procedure: ORIF, FRACTURE, CLAVICLE;  Surgeon: Satya Reynoso IV, MD;  Location: [facility];  Service: Orthopedics;  Laterality: Left;  Beach chair, padded darling stand (no arm krishnamurthy needed), Large C arm come in from head of bed; acumed clavicle system  dian notified 9/19 @ 1015 [facility]    TONSILLECTOMY      TYMPANOSTOMY TUBE PLACEMENT       Family History   Problem Relation Name Age of Onset    Oswald Brock" sequence Brother Shekhar morillo     Asthma Brother Shekhar morillo     Bipolar disorder Maternal Uncle      Alcohol abuse Father      Drug abuse Father      Personality disorder Father      ADD / ADHD Mother      Depression Mother      Physical abuse Paternal Grandmother       Medication List with Changes/Refills   Current Medications    ACETAMINOPHEN (TYLENOL) 325 MG TABLET    Take 325 mg by mouth every 6 (six) hours as needed for Pain.    ALBUTEROL (VENTOLIN HFA) 90 MCG/ACTUATION INHALER    Inhale 1-2 puffs into the lungs every 4 (four) hours as needed for Wheezing or Shortness of Breath. Rescue    ASPIRIN (ECOTRIN) 81 MG EC TABLET    Take 1 tablet (81 mg total) by mouth once daily.    DEXMETHYLPHENIDATE (FOCALIN XR) 25 MG 24 HR CAPSULE    Take 50 mg by mouth once daily. Dx=F90.0 Fill on or after 6/19/2020    DEXMETHYLPHENIDATE (FOCALIN XR) 25 MG 24 HR CAPSULE    Take 50 mg by mouth once daily. Dx=F90.0  Fill on or after 9/20/2020    DEXMETHYLPHENIDATE (FOCALIN) 10 MG TABLET    Take 2 tablets (20 mg total) by mouth every evening. Dx=F90.0 Fill on or after 9/20/2020    GABAPENTIN (NEURONTIN) 300 MG CAPSULE    Take 1 capsule (300 mg total) by mouth every evening. for 14 doses    INHALATION SPACING DEVICE    Use as directed for inhalation.    OXYCODONE (ROXICODONE) 5 MG IMMEDIATE RELEASE TABLET    Take 1 tablet (5 mg total) by mouth every 6 (six) hours as needed for Pain.      Review of patient's allergies indicates:   Allergen Reactions    Omnicef [cefdinir] Edema     Review of Systems   Constitutional:  Negative for chills and fever.   Respiratory:  Positive for shortness of breath. Negative for cough.    Cardiovascular:  Positive for chest pain.   Gastrointestinal: Negative.    Genitourinary: Negative.    Musculoskeletal: Negative.    Neurological: Negative.      Objective     Vitals:    03/11/25 0800   BP: 113/60   Pulse: 89   Resp: 18   Temp: 98.8 °F (37.1 °C)       Physical Exam  HENT:      Head:  Normocephalic.   Cardiovascular:      Rate and Rhythm: Normal rate and regular rhythm.      Pulses: Normal pulses.      Heart sounds: Normal heart sounds.   Pulmonary:      Effort: Pulmonary effort is normal.      Breath sounds: Normal breath sounds.   Musculoskeletal:      Right lower leg: No edema.      Left lower leg: No edema.   Skin:     General: Skin is warm.      Capillary Refill: Capillary refill takes less than 2 seconds.   Neurological:      Mental Status: He is alert and oriented to person, place, and time.       CBC W/ AUTO DIFFERENTIAL - Abnormal       Result Value      WBC 9.54        RBC 4.48 (*)       Hemoglobin 13.1 (*)       Hematocrit 39.6 (*)       MCV 88        MCH 29.2        MCHC 33.1        RDW 13.1        Platelets 195        MPV 11.7        Immature Granulocytes 0.2        Gran # (ANC) 5.5        Immature Grans (Abs) 0.02        Lymph # 2.6        Mono # 1.2 (*)       Eos # 0.3        Baso # 0.04        nRBC 0        Gran % 57.2        Lymph % 27.4        Mono % 12.2        Eosinophil % 2.6        Basophil % 0.4        Differential Method Automated      COMPREHENSIVE METABOLIC PANEL - Abnormal     Sodium 138        Potassium 3.9        Chloride 106        CO2 21 (*)       Glucose 99        BUN 12        Creatinine 0.8        Calcium 8.9        Total Protein 7.4        Albumin 3.8        Total Bilirubin 0.7        Alkaline Phosphatase 63        AST 14        ALT 14        eGFR >60        Anion Gap 11      D DIMER, QUANTITATIVE - Abnormal     D-Dimer 0.85 (*)     TROPONIN I     Troponin I <0.006      B-TYPE NATRIURETIC PEPTIDE     BNP 11         Imaging Results              CTA Chest Non-Coronary (PE Studies) (Final result)  Result time 03/10/25 19:39:53      Final result by Ronaldo Valadez DO (03/10/25 19:39:53)                   Impression:      No pulmonary embolism to the segmental level.    Moderate pericardial effusion.      Electronically signed by: Ronaldo  Allyson  Date:    03/10/2025  Time:    19:39               Narrative:    EXAMINATION:  CTA CHEST NON CORONARY (PE STUDIES)    CLINICAL HISTORY:  Pulmonary embolism (PE) suspected, positive D-dimer;    TECHNIQUE:  Low dose axial images, sagittal and coronal reformations were obtained from the thoracic inlet to the lung bases following the IV administration of 100 mL of Omnipaque 350.  Contrast timing was optimized to evaluate the pulmonary arteries.  Maximum intensity projection images were provided for review.    COMPARISON:  Chest radiograph from earlier the same date.    FINDINGS:  Pulmonary vasculature: Satisfactory opacification of the pulmonary arterial system with no filling defect to the segmental level.    Aorta: Left-sided aortic arch.  No aneurysm and no significant atherosclerosis.    Base of Neck: No significant abnormality.    Thoracic soft tissues: Normal.    Heart: Normal size.  There is a moderate pericardial effusion.    Melissa/Mediastinum: No pathologic keegan enlargement.    Airways: The large airways are patent. No foci of endobronchial filling.    Lungs/Pleura: Bandlike opacities in the left lower lobe, compatible with subsegmental atelectasis or scarring.  The lungs are otherwise clear.  No pleural effusion or thickening.    Esophagus: Normal.    Upper Abdomen: No abnormality of the partially imaged upper abdomen.    Bones: No acute fracture. No suspicious lytic or sclerotic lesions.  Left clavicle hardware partially imaged.                                       X-Ray Chest AP Portable (Final result)  Result time 03/10/25 17:06:48      Final result by Garo Regalado IV, MD (03/10/25 17:06:48)                   Impression:      As above.      Electronically signed by: Garo Regalado  Date:    03/10/2025  Time:    17:06               Narrative:    EXAMINATION:  XR CHEST AP PORTABLE    CLINICAL HISTORY:  Chest Pain;    TECHNIQUE:  Single frontal view of the chest was  performed.    COMPARISON:  Radiograph 04/24/2024    FINDINGS:  Mediastinal structures are midline.  Normal mediastinal and hilar contours.  Normal size cardiac silhouette.  Lungs are symmetrically expanded and well aerated.  No focal consolidation.  No pneumothorax.  No significant pleural fluid.  Osseous structures appear intact noting hardware in left clavicle.                                    Assessment & Plan      Pericardial Effusion   - ECG showed IA depression in leads 2, 3 and aVF with ST elevations in V5 and V6   - Elevated D-dimer .85   - Troponin/BNP normal  - Moderate pericardial effusion noted on CTA chest done to rule out PE (elevated D-dimer - .85)  - Echo shows EF 59%, moderate effusion, and no indication of tamponade  - Suspected post viral pericarditis given clinical presentation and hx of flu  - Given ibuprofen and colchicine  - Consider PPI  - Pending labs and DOM  - Continue monitoring symptoms      Anita Chester, MS3

## 2025-03-11 NOTE — ED NOTES
MD Loveocent notified via secure chat pt requesting update on discharge status, awaiting response at this time. Noted pt admit bed assigned,. Awaiting response from MD prior to calling report.

## 2025-03-11 NOTE — PHARMACY MED REC
"[facility]           Pharmacy  Admission Medication History     The home medication history was taken by Lilibeth Martinez.      Medication history obtained from Medications listed below were obtained from: Patient/family    Based on information gathered for medication list, you may go to "Admission" then "Reconcile Home Medications" tabs to review and/or act upon those items.     The home medication list has been updated by the Pharmacy department.   Please read ALL comments highlighted in yellow.   Please address this information as you see fit.    Feel free to contact us if you have any questions or require assistance.        No current facility-administered medications on file prior to encounter.     Current Outpatient Medications on File Prior to Encounter   Medication Sig Dispense Refill    albuterol (VENTOLIN HFA) 90 mcg/actuation inhaler Inhale 2 puffs into the lungs every 4 (four) hours as needed for Wheezing or Shortness of Breath. Rescue         Please address this information as you see fit.  Feel free to contact us if you have any questions or require assistance.    Lilibeth Martinez  [phone]                  .          "

## 2025-03-11 NOTE — ASSESSMENT & PLAN NOTE
Symptoms cw pericarditis - noted treatment initiated  Repeat EKG  Symptoms improving  ESR, CRP pending

## 2025-03-11 NOTE — PROGRESS NOTES
"[facility]           Pharmacy  Admission Medication History     The home medication history was taken by Pato Elias PharmD.      Medication history obtained from Medications listed below were obtained from: Patient/family    Based on information gathered for medication list, you may go to "Admission" then "Reconcile Home Medications" tabs to review and/or act upon those items.     The home medication list has been updated by the Pharmacy department.   Please read ALL comments highlighted in yellow.   Please address this information as you see fit.    Feel free to contact us if you have any questions or require assistance.    The medications listed below were removed from the home medication list.  Please reorder if appropriate:    Home medications have been reviewed  Potential issues to be addressed PRIOR TO DISCHARGE      No current facility-administered medications on file prior to encounter.     Current Outpatient Medications on File Prior to Encounter   Medication Sig Dispense Refill    albuterol (VENTOLIN HFA) 90 mcg/actuation inhaler Inhale 2 puffs into the lungs every 4 (four) hours as needed for Wheezing or Shortness of Breath. Rescue      aspirin (ECOTRIN) 81 MG EC tablet Take 1 tablet (81 mg total) by mouth once daily. (Patient not taking: Reported on 3/11/2025) 30 tablet 0    OPTICHAMBER CHIQUITA LG MASK Spcr USE WITH INHALER      [DISCONTINUED] acetaminophen (TYLENOL) 325 MG tablet Take 325 mg by mouth every 6 (six) hours as needed for Pain. (Patient not taking: Reported on 2/6/2025)      [DISCONTINUED] albuterol (VENTOLIN HFA) 90 mcg/actuation inhaler Inhale 1-2 puffs into the lungs every 4 (four) hours as needed for Wheezing or Shortness of Breath. Rescue 18 g 0    [DISCONTINUED] dexmethylphenidate (FOCALIN XR) 25 mg 24 hr capsule Take 50 mg by mouth once daily. Dx=F90.0 Fill on or after 6/19/2020 (Patient not taking: Reported on 2/6/2025) 60 capsule 0    [DISCONTINUED] " dexmethylphenidate (FOCALIN XR) 25 mg 24 hr capsule Take 50 mg by mouth once daily. Dx=F90.0  Fill on or after 9/20/2020 (Patient not taking: Reported on 2/6/2025) 60 capsule 0    [DISCONTINUED] dexmethylphenidate (FOCALIN) 10 MG tablet Take 2 tablets (20 mg total) by mouth every evening. Dx=F90.0 Fill on or after 9/20/2020 (Patient not taking: Reported on 2/6/2025) 60 tablet 0    [DISCONTINUED] gabapentin (NEURONTIN) 300 MG capsule Take 1 capsule (300 mg total) by mouth every evening. for 14 doses (Patient not taking: Reported on 3/11/2025) 14 capsule 0    [DISCONTINUED] inhalation spacing device Use as directed for inhalation. 1 each 0    [DISCONTINUED] oxyCODONE (ROXICODONE) 5 MG immediate release tablet Take 1 tablet (5 mg total) by mouth every 6 (six) hours as needed for Pain. (Patient not taking: Reported on 2/6/2025) 30 tablet 0       Please address this information as you see fit.  Feel free to contact us if you have any questions or require assistance.    Pato Elias, PharmD  [phone]

## 2025-03-11 NOTE — ED NOTES
Pt. Is sleeping without distress. Easily arrousable, reports minimal change from generalized chest pressure/discomfort. Reports hx. Of this with sob and wheezing with exertional activity. He reports minimal relief from resp. Medications administered. No visible dyspnea presently. Updated on plan of care-Echocardiogram this morning. Cardiology consult for evaluation.

## 2025-03-11 NOTE — HPI
Patient is a 24-year-old male with history of ADHD presenting to ED with 4 days of midsternal chest tightness pain and shortness a breath.  Patient reports he has had similar symptoms in the past and short-lived around 30 minutes.  Patient reports 4 days ago he began with these aforementioned symptoms with additional light-headedness patient reports he used his inhaler but did not relieved his symptoms.  Patient states pain is worse with lying back , leaning forward, exertional, deep breathing.  Patient diagnosed with influenza a 1 month ago which is when he quit nicotine.  He does report THC use daily.  Patient denies cough, lower extremity swelling, no recent trauma.  In ED labwork remarkable for elevated D-dimer of 0.85.  Chest x-ray benign.  BNP and troponin negative.  CTA of the chest negative for PE however does show moderate size pericardial effusion, cardiology made aware of patient.  Patient with no JVD, muffled heart tones, nor urine pulse pressure.  No signs of tamponade.  Patient will admitted to Hospital Medicine obtain echo and consult Cardiology.

## 2025-03-11 NOTE — ASSESSMENT & PLAN NOTE
CTA chest ruled out pulmonary embolism however does show moderate pleural effusion  Likely secondary to postviral syndrome.  Echo negative for tamponade  Follow up with Dr Kelly

## 2025-03-11 NOTE — HPI
24-year-old male with ADHD, THC use. Patient presented to the ED with 4 days of constant CP with associated SOB. Patient reports similar symptoms in the past which were short lived that he attributed to vaping.  Patient reports pain is worse with position changes and deep breathing. He had Flu A a month ago and at that time he stopped vaping.  He was noted to have an elevated D Dimer. CTA chest negative for PE. + pericardial effusion which was also noted on TTE without evidence of tamponade. Troponin negative. Patient denies diaphoresis, NV, cough, palpitations.   ESR, CRP pending.  Patient started on IBU and colchicine.

## 2025-03-11 NOTE — ASSESSMENT & PLAN NOTE
CTA chest ruled out pulmonary embolism however does show moderate pleural effusion  Likely secondary to postviral syndrome.    Consult placed to Cardiology  Await recs  Hemodynamically stable  Check TSH  Check ESR and CRP  Echo ordered

## 2025-03-11 NOTE — ASSESSMENT & PLAN NOTE
No tamponade on TTE  No clinical signs of tamponade  Hemodynamically stable  Recent Flu A  No indication for pericardiocentesis   Follow up with Dr Kelly per patient's preference

## 2025-03-12 ENCOUNTER — CLINICAL SUPPORT (OUTPATIENT)
Dept: SMOKING CESSATION | Facility: CLINIC | Age: 25
End: 2025-03-12

## 2025-03-12 VITALS
OXYGEN SATURATION: 96 % | RESPIRATION RATE: 18 BRPM | SYSTOLIC BLOOD PRESSURE: 117 MMHG | BODY MASS INDEX: 21.23 KG/M2 | HEIGHT: 74 IN | WEIGHT: 165.38 LBS | DIASTOLIC BLOOD PRESSURE: 70 MMHG | TEMPERATURE: 98 F | HEART RATE: 88 BPM

## 2025-03-12 DIAGNOSIS — F17.210 CIGARETTE SMOKER: Primary | ICD-10-CM

## 2025-03-12 PROBLEM — I30.9 ACUTE PERICARDITIS: Status: ACTIVE | Noted: 2025-03-12

## 2025-03-12 LAB
ALBUMIN SERPL BCP-MCNC: 3.7 G/DL (ref 3.5–5.2)
ALP SERPL-CCNC: 59 U/L (ref 40–150)
ALT SERPL W/O P-5'-P-CCNC: 17 U/L (ref 10–44)
ANION GAP SERPL CALC-SCNC: 8 MMOL/L (ref 8–16)
AST SERPL-CCNC: 16 U/L (ref 10–40)
BASOPHILS # BLD AUTO: 0.06 K/UL (ref 0–0.2)
BASOPHILS NFR BLD: 0.8 % (ref 0–1.9)
BILIRUB SERPL-MCNC: 0.4 MG/DL (ref 0.1–1)
BUN SERPL-MCNC: 12 MG/DL (ref 6–20)
CALCIUM SERPL-MCNC: 9.6 MG/DL (ref 8.7–10.5)
CHLORIDE SERPL-SCNC: 109 MMOL/L (ref 95–110)
CO2 SERPL-SCNC: 24 MMOL/L (ref 23–29)
CREAT SERPL-MCNC: 0.8 MG/DL (ref 0.5–1.4)
DIFFERENTIAL METHOD BLD: ABNORMAL
EOSINOPHIL # BLD AUTO: 0.6 K/UL (ref 0–0.5)
EOSINOPHIL NFR BLD: 8 % (ref 0–8)
ERYTHROCYTE [DISTWIDTH] IN BLOOD BY AUTOMATED COUNT: 12.8 % (ref 11.5–14.5)
EST. GFR  (NO RACE VARIABLE): >60 ML/MIN/1.73 M^2
GLUCOSE SERPL-MCNC: 95 MG/DL (ref 70–110)
HCT VFR BLD AUTO: 42 % (ref 40–54)
HGB BLD-MCNC: 13.7 G/DL (ref 14–18)
IMM GRANULOCYTES # BLD AUTO: 0.02 K/UL (ref 0–0.04)
IMM GRANULOCYTES NFR BLD AUTO: 0.3 % (ref 0–0.5)
LYMPHOCYTES # BLD AUTO: 2.9 K/UL (ref 1–4.8)
LYMPHOCYTES NFR BLD: 38.2 % (ref 18–48)
MCH RBC QN AUTO: 28.8 PG (ref 27–31)
MCHC RBC AUTO-ENTMCNC: 32.6 G/DL (ref 32–36)
MCV RBC AUTO: 88 FL (ref 82–98)
MONOCYTES # BLD AUTO: 0.8 K/UL (ref 0.3–1)
MONOCYTES NFR BLD: 10.7 % (ref 4–15)
NEUTROPHILS # BLD AUTO: 3.1 K/UL (ref 1.8–7.7)
NEUTROPHILS NFR BLD: 42 % (ref 38–73)
NRBC BLD-RTO: 0 /100 WBC
OHS QRS DURATION: 82 MS
OHS QTC CALCULATION: 429 MS
PLATELET # BLD AUTO: 259 K/UL (ref 150–450)
PMV BLD AUTO: 11.5 FL (ref 9.2–12.9)
POTASSIUM SERPL-SCNC: 4.8 MMOL/L (ref 3.5–5.1)
PROT SERPL-MCNC: 7.5 G/DL (ref 6–8.4)
RBC # BLD AUTO: 4.75 M/UL (ref 4.6–6.2)
SODIUM SERPL-SCNC: 141 MMOL/L (ref 136–145)
WBC # BLD AUTO: 7.46 K/UL (ref 3.9–12.7)

## 2025-03-12 PROCEDURE — 36415 COLL VENOUS BLD VENIPUNCTURE: CPT | Performed by: REGISTERED NURSE

## 2025-03-12 PROCEDURE — 80053 COMPREHEN METABOLIC PANEL: CPT | Performed by: REGISTERED NURSE

## 2025-03-12 PROCEDURE — 85025 COMPLETE CBC W/AUTO DIFF WBC: CPT | Performed by: REGISTERED NURSE

## 2025-03-12 PROCEDURE — 99214 OFFICE O/P EST MOD 30 MIN: CPT | Mod: ,,, | Performed by: NURSE PRACTITIONER

## 2025-03-12 PROCEDURE — G0378 HOSPITAL OBSERVATION PER HR: HCPCS

## 2025-03-12 PROCEDURE — 25000003 PHARM REV CODE 250: Performed by: REGISTERED NURSE

## 2025-03-12 RX ORDER — COLCHICINE 0.6 MG/1
0.6 TABLET ORAL DAILY
Qty: 30 TABLET | Refills: 0 | Status: SHIPPED | OUTPATIENT
Start: 2025-03-13 | End: 2026-03-13

## 2025-03-12 RX ORDER — IBUPROFEN 200 MG
TABLET ORAL
Qty: 63 TABLET | Refills: 0 | Status: SHIPPED | OUTPATIENT
Start: 2025-03-12 | End: 2025-03-18

## 2025-03-12 RX ADMIN — IBUPROFEN 800 MG: 400 TABLET ORAL at 02:03

## 2025-03-12 RX ADMIN — COLCHICINE 0.6 MG: 0.6 TABLET ORAL at 08:03

## 2025-03-12 RX ADMIN — IBUPROFEN 800 MG: 400 TABLET ORAL at 06:03

## 2025-03-12 NOTE — PLAN OF CARE
VN reviewed discharge instructions with pt. Using teach back method.  AVS printed and handed to pt by bedside nurse.  Reviewed follow-up appointments, medications, diet, and importance of medication compliance.  Reviewed home care instructions, treatment plan, self-management, and when to seek medical attention.  Allowed time for questions.  All questions answered.  Patient verbalized complete understanding of discharge instructions and voices no concerns.     Discharge instructions complete.  Pt waiting on Bedside delivery   Bedside nurse notified.

## 2025-03-12 NOTE — PLAN OF CARE
went to meet with patient. Patient's girlfriend at bedside. Facesheet information updated. Patient reports he is independent and lives at home with his grandparents. He does not use any DME or have Home Health. He still drives. Patient has a new PCP follow-up scheduled already. He was unable to recall name.  will review appointment section. No anticipated discharge needs. Patient encouraged to call with any questions or concerns.  will continue to follow patient through transitions of care and assist with any discharge needs.     Other Contacts    Name Relation Home Work Mobile   Nickie Snyder Mother   [phone]   Robert Yang Father   [phone]       Fracisco Phipps MD PCP - General Internal Medicine [phone] [address] [place] [address]      Next Steps: Follow up on 3/21/2025  Instructions: at 9:30 AM, Primary Care Physician    Jonathon Kelly MD  Interventional Cardiology Cardiology [phone] [address] [facility] [facility] [address] [place] [address]     Next Steps: Follow up        03/12/25 1005   Discharge Assessment   Assessment Type Discharge Planning Assessment   Confirmed/corrected address, phone number and insurance Yes   Confirmed Demographics Correct on Facesheet   Source of Information patient   People in Home grandparent(s)   Do you expect to return to your current living situation? Yes   Do you have help at home or someone to help you manage your care at home? Yes   Prior to hospitilization cognitive status: Alert/Oriented   Current cognitive status: Alert/Oriented   Walking or Climbing Stairs Difficulty no   Dressing/Bathing Difficulty no   Equipment Currently Used at Home none   Do you have prescription coverage? Yes   How do you get to doctors appointments? car, drives self   Are you on dialysis? No   Do you take coumadin? No   Discharge Plan A Home   Discharge Plan B Home with family   DME Needed Upon Discharge   none   Discharge Plan discussed with: Patient   Transition of Care Barriers None     Kellen Cedeno RN    [phone]

## 2025-03-12 NOTE — PROGRESS NOTES
[facility] - Telemetry  Cardiology  Progress Note    Patient Name: Shekhar Snyder  MRN: [id number]  Admission Date: 3/10/2025  Hospital Length of Stay: 0 days  Code Status: Full Code   Attending Physician: Arianne Chan MD   Primary Care Physician: Fracisco Phipps MD  Expected Discharge Date: 3/12/2025  Principal Problem:Pericardial effusion    Subjective:     Hospital Course:   03/11/2025 Per HPI   03/12/2025 CP improved. Hemodynamically stable. ESR/CRP elevated. Tolerating treatment well.     Past Medical History:   Diagnosis Date    ADHD (attention deficit hyperactivity disorder)     Depression     PTSD (post-traumatic stress disorder)        Past Surgical History:   Procedure Laterality Date    ADENOIDECTOMY      CIRCUMCISION      OPEN REDUCTION AND INTERNAL FIXATION (ORIF) OF FRACTURE OF CLAVICLE Left 9/20/2022    Procedure: ORIF, FRACTURE, CLAVICLE;  Surgeon: Satya Reynoso IV, MD;  Location: [facility];  Service: Orthopedics;  Laterality: Left;  Beach chair, padded darling stand (no arm krishnamurthy needed), Large C arm come in from head of bed; acumed clavicle system  zack notified 9/19 @ [address] [facility]    TONSILLECTOMY      TYMPANOSTOMY TUBE PLACEMENT         Review of patient's allergies indicates:   Allergen Reactions    Omnicef [cefdinir] Edema       No current facility-administered medications on file prior to encounter.     Current Outpatient Medications on File Prior to Encounter   Medication Sig    albuterol (VENTOLIN HFA) 90 mcg/actuation inhaler Inhale 2 puffs into the lungs every 4 (four) hours as needed for Wheezing or Shortness of Breath. Rescue    OPTICHAMBER CHIQUITA LG MASK Spcr USE WITH INHALER     Family History       Problem Relation (Age of Onset)    ADD / ADHD Mother    Alcohol abuse Father    Asthma Brother    Bipolar disorder Maternal Uncle    Depression Mother    Drug abuse Father    Personality disorder Father    Physical abuse Paternal Grandmother    Oswald Brock sequence Brother          Tobacco Use     Smoking status: Every Day     Types: Cigarettes     Passive exposure: Never    Smokeless tobacco: Never   Substance and Sexual Activity    Alcohol use: No    Drug use: Yes     Types: Marijuana    Sexual activity: Never     Review of Systems   Constitutional: Negative. Negative for diaphoresis and fever.   HENT: Negative.     Eyes: Negative.    Cardiovascular:  Positive for chest pain. Negative for irregular heartbeat, leg swelling, near-syncope, orthopnea, palpitations, paroxysmal nocturnal dyspnea and syncope.   Respiratory: Negative.  Negative for cough.    Endocrine: Negative.    Hematologic/Lymphatic: Negative.    Gastrointestinal: Negative.  Negative for nausea and vomiting.   Genitourinary: Negative.    Neurological:  Negative for dizziness, focal weakness, light-headedness and weakness.   Allergic/Immunologic: Negative.      Objective:     Vital Signs (Most Recent):  Temp: 97.8 °F (36.6 °C) (03/12/25 0746)  Pulse: 72 (03/12/25 0746)  Resp: 18 (03/12/25 0746)  BP: (!) 94/56 (03/12/25 0746)  SpO2: 97 % (03/12/25 0746) Vital Signs (24h Range):  Temp:  [97.3 °F (36.3 °C)-97.9 °F (36.6 °C)] 97.8 °F (36.6 °C)  Pulse:  [] 72  Resp:  [15-20] 18  SpO2:  [96 %-99 %] 97 %  BP: ()/(56-93) 94/56     Weight: 75 kg (165 lb 5.5 oz)  Body mass index is 21.23 kg/m².    SpO2: 97 %         Intake/Output Summary (Last 24 hours) at 3/12/2025 1005  Last data filed at 3/12/2025 0600  Gross per 24 hour   Intake 345 ml   Output --   Net 345 ml       Lines/Drains/Airways       Peripheral Intravenous Line  Duration                  Peripheral IV - Single Lumen 03/10/25 1736 20 G 1 in Left Antecubital 1 day                     Physical Exam  Constitutional:       General: He is not in acute distress.     Appearance: He is not diaphoretic.   HENT:      Head: Atraumatic.   Eyes:      General:         Right eye: No discharge.         Left eye: No discharge.   Cardiovascular:      Rate and Rhythm: Normal rate and regular  "rhythm.   Pulmonary:      Effort: Pulmonary effort is normal.      Breath sounds: Normal breath sounds.   Abdominal:      General: Bowel sounds are normal.      Palpations: Abdomen is soft.   Musculoskeletal:      Right lower leg: No edema.      Left lower leg: No edema.   Skin:     General: Skin is warm and dry.   Neurological:      Mental Status: He is alert and oriented to person, place, and time.          Significant Labs: BMP:   Recent Labs   Lab 03/10/25  1736 03/11/25  1243 03/12/25  0553   GLU 99 89 95    139 141   K 3.9 3.9 4.8    107 109   CO2 21* 23 24   BUN 12 10 12   CREATININE 0.8 0.8 0.8   CALCIUM 8.9 9.3 9.6   , CMP   Recent Labs   Lab 03/10/25  1736 03/11/25  1243 03/12/25  0553    139 141   K 3.9 3.9 4.8    107 109   CO2 21* 23 24   GLU 99 89 95   BUN 12 10 12   CREATININE 0.8 0.8 0.8   CALCIUM 8.9 9.3 9.6   PROT 7.4 7.3 7.5   ALBUMIN 3.8 3.6 3.7   BILITOT 0.7 0.5 0.4   ALKPHOS 63 62 59   AST 14 13 16   ALT 14 9* 17   ANIONGAP 11 9 8   , CBC   Recent Labs   Lab 03/10/25  1736 03/11/25  1243 03/12/25  0553   WBC 9.54 9.01 7.46   HGB 13.1* 13.8* 13.7*   HCT 39.6* 42.5 42.0    247 259   , INR No results for input(s): "INR", "PROTIME" in the last 48 hours., Lipid Panel No results for input(s): "CHOL", "HDL", "LDLCALC", "TRIG", "CHOLHDL" in the last 48 hours., and Troponin No results for input(s): "TROPONINIHS" in the last 48 hours.    Significant Imaging: Echocardiogram: Transthoracic echo (TTE) complete (Cupid Only):   Results for orders placed or performed during the hospital encounter of 03/10/25   Echo   Result Value Ref Range    BSA 1.83 m2    Johnson's Biplane MOD Ejection Fraction 59 %    A2C EF 50 %    A4C EF 68 %    LVOT stroke volume 81.4 cm3    LVIDd 4.8 3.5 - 6.0 cm    LV Systolic Volume 42 mL    LV Systolic Volume Index 22.5 mL/m2    LVIDs 3.2 2.1 - 4.0 cm    LV ESV A2C 36.26 mL    LV Diastolic Volume 108 mL    LV ESV A4C 35.04 mL    LV Diastolic Volume Index " "57.75 mL/m2    LV EDV A2C 88.560633193381218 mL    LV EDV A4C 92.06 mL    Left Ventricular End Systolic Volume by Teichholz Method 41.98 mL    Left Ventricular End Diastolic Volume by Teichholz Method 108.46 mL    IVS 1.0 0.6 - 1.1 cm    LVOT diameter 2.1 cm    LVOT area 3.5 cm2    FS 33.3 28 - 44 %    Left Ventricle Relative Wall Thickness 0.42 cm    PW 1.0 0.6 - 1.1 cm    LV mass 170.2 g    LV Mass Index 91.0 g/m2    MV Peak E Jaden 1.06 m/s    TDI LATERAL 0.08 m/s    TDI SEPTAL 0.09 m/s    E/E' ratio 12 m/s    MV Peak A Jaden 0.68 m/s    TR Max Jaden 2.5 m/s    E/A ratio 1.56     E wave deceleration time 241 msec    MV "A" wave duration 137.244079654134975 msec    LV SEPTAL E/E' RATIO 11.8 m/s    LV LATERAL E/E' RATIO 13.3 m/s    PV Peak S Jaden 0.40 m/s    PV Peak D Jaden 0.69 m/s    Pulm vein S/D ratio 0.58     LVOT peak jaden 1.2 m/s    Left Ventricular Outflow Tract Mean Velocity 0.77 cm/s    Left Ventricular Outflow Tract Mean Gradient 2.90 mmHg    RV- cai basal diam 4.1 cm    RV S' 12.10 cm/s    TAPSE 2.41 cm    RV/LV Ratio 0.85 cm    LA Vol (MOD) 37 mL    DENZEL (MOD) 20 mL/m2    RA area length vol 30.58 mL    RA Area 12.6 cm2    RA Vol 31.82 mL    AV mean gradient 4 mmHg    AV peak gradient 8 mmHg    Ao peak jaden 1.4 m/s    Ao VTI 27.1 cm    LVOT peak VTI 23.5 cm    AV valve area 3.0 cm²    AV Velocity Ratio 0.86     AV index (prosthetic) 0.87     SAM by Velocity Ratio 3.0 cm²    Mr max jaden 2.05 m/s    MV peak gradient 4 mmHg    MV stenosis pressure 1/2 time 70.01 ms    MV valve area p 1/2 method 3.14 cm2    MV valve area by continuity eq 3.65 cm2    MV VTI 22.3 cm    Triscuspid Valve Regurgitation Peak Gradient 26 mmHg    PV PEAK VELOCITY 0.99 m/s    PV peak gradient 4 mmHg    Pulmonary Valve Mean Velocity 0.80 m/s    Sinus 3.17 cm    STJ 2.56 cm    Ascending aorta 2.98 cm    IVC diameter 2.40 cm    Mean e' 0.09 m/s    ZLVIDS -0.04     ZLVIDD -0.82     LA area A4C 14.55 cm2    LA area A2C 15.02 cm2    TV resting " pulmonary artery pressure 28 mmHg    RV TB RVSP 6 mmHg    Est. RA pres 3 mmHg    Narrative      Left Ventricle: The left ventricle is normal in size. Normal wall   thickness. Normal wall motion. There is normal systolic function with a   visually estimated ejection fraction of 55 - 60%. Quantitated ejection   fraction is 59%. There is normal diastolic function.    Right Ventricle: The right ventricle has mild enlargement. Systolic   function is normal.    Left Atrium: Normal left atrial size.    Aortic Valve: The aortic valve is a trileaflet valve. There is no   significant regurgitation.    Mitral Valve: There is trace regurgitation.    Tricuspid Valve: There is trace regurgitation.    Pulmonary Artery: The estimated pulmonary artery systolic pressure is   28 mmHg.    IVC/SVC: Normal venous pressure at 3 mmHg.    Pericardium: There is a moderate effusion. No indication of cardiac   tamponade. Evidence includes no chamber collapse.       Assessment and Plan:     Brief HPI: Patient seen this morning on rounds with reports of improvement. Discussed follow up with cardiology and rheumatology as OP.     * Pericardial effusion  No tamponade on TTE  No clinical signs of tamponade  Hemodynamically stable  Recent Flu A  No indication for pericardiocentesis   Follow up with Dr Kelly per patient's preference   Will need rheumatology follow up   Strong FH of autoimmune disease     Acute pericarditis  Strong FH of autoimmune disease   Recurrent symptoms   Will need rheumatology eval as OP  Continue NSAID/colchicine   If recurrence- recommend steroids as OP     Chest pain  Symptoms cw pericarditis - noted treatment initiated  Repeat EKG  Symptoms improving  ESR, CRP elevated  Will need rheumatology follow up as well as cardiology.  Strong FH of autoimmune disease           VTE Risk Mitigation (From admission, onward)           Ordered     IP VTE LOW RISK PATIENT  Once         03/11/25 0043     Place sequential compression device   Until discontinued         03/11/25 0043                    Eddie Rose NP  Cardiology  [facility] - Telemetry

## 2025-03-12 NOTE — ASSESSMENT & PLAN NOTE
Strong FH of autoimmune disease   Recurrent symptoms   Will need rheumatology eval as OP  Continue NSAID/colchicine   If recurrence- recommend steroids as OP

## 2025-03-12 NOTE — ASSESSMENT & PLAN NOTE
Symptoms cw pericarditis - noted treatment initiated  Repeat EKG  Symptoms improving  ESR, CRP elevated  Will need rheumatology follow up as well as cardiology.  Strong FH of autoimmune disease

## 2025-03-12 NOTE — PROGRESS NOTES
"  Smoking cessation education provided. Pt states that he started smoking cigarettes and vaping at age 19, but then quit using nicotine in any form 1 month ago when he started feeling ill.  He declines referral to Ambulatory Smoking Cessation clinic. Handout provided and pt encouraged to contact clinic if relapse should occur or if any additional resources are needed.  EMR to be updated to reflect "Former Smoker" status when pt remains without relapse as of 2/12//2026.  "

## 2025-03-12 NOTE — HOSPITAL COURSE
Patient was evaluated by Cardiology.  Echocardiogram with no signs of tamponade.  He was treated with ibuprofen and colchicine.  His symptoms improved.  He was cleared for discharge.

## 2025-03-12 NOTE — DISCHARGE SUMMARY
[facility] Medicine  Discharge Summary      Patient Name: Shekhar Snyder  MRN: [id number]  CHANDA: [id number]  Patient Class: OP- Observation  Admission Date: 3/10/2025  Hospital Length of Stay: 0 days  Discharge Date and Time: 3/12/2025  3:24 PM  Attending Physician: Arianne Chan MD   Discharging Provider: Arianne Chan MD  Primary Care Provider: Fracisco Phipps MD      Primary Care Team: Networked reference to record PCT     HPI:   Patient is a 24-year-old male with history of ADHD presenting to ED with 4 days of midsternal chest tightness pain and shortness a breath.  Patient reports he has had similar symptoms in the past and short-lived around 30 minutes.  Patient reports 4 days ago he began with these aforementioned symptoms with additional light-headedness patient reports he used his inhaler but did not relieved his symptoms.  Patient states pain is worse with lying back , leaning forward, exertional, deep breathing.  Patient diagnosed with influenza a 1 month ago which is when he quit nicotine.  He does report THC use daily.  Patient denies cough, lower extremity swelling, no recent trauma.  In ED labwork remarkable for elevated D-dimer of 0.85.  Chest x-ray benign.  BNP and troponin negative.  CTA of the chest negative for PE however does show moderate size pericardial effusion, cardiology made aware of patient.  Patient with no JVD, muffled heart tones, nor urine pulse pressure.  No signs of tamponade.  Patient will admitted to Hospital Medicine obtain echo and consult Cardiology.      Hospital Course:   Patient was evaluated by Cardiology.  Echocardiogram with no signs of tamponade.  He was treated with ibuprofen and colchicine.  His symptoms improved.  He was cleared for discharge.     SDOH Screening:  The patient was screened for utility difficulties, food insecurity, transport difficulties, housing insecurity, and interpersonal safety and there were no concerns identified  this admission.     Consults:   Consults (From admission, onward)          Status Ordering Provider     Cardiology-[facility]  Once        Provider:  (Not yet assigned)    Completed NORBERTO CHASE     Inpatient consult to Cardiology  Once        Provider:  Lucas Tran MD    Completed ZACH FUENTES          Assessment & Plan  Pericardial effusion  CTA chest ruled out pulmonary embolism however does show moderate pleural effusion  Likely secondary to postviral syndrome.  Echo negative for tamponade  Follow up with Dr Kelly  Chest pain  Improved  Former smoker  Indefinite cessation advised  Quit smoking 1 month ago  Acute pericarditis  Stable  Continue colchicine  Outpatient follow-up with Dr. Kelly/ Rheumatology    Final Active Diagnoses:    Diagnosis Date Noted POA    PRINCIPAL PROBLEM:  Pericardial effusion [I31.39] 03/11/2025 Yes    Acute pericarditis [I30.9] 03/12/2025 Yes    Chest pain [R07.9] 03/11/2025 Yes    Former smoker [Z87.891] 03/11/2025 Not Applicable      Problems Resolved During this Admission:       Discharged Condition: stable    Disposition:     Follow Up:   Follow-up Information       Fracisco Phipps MD Follow up on 3/21/2025.    Specialty: Internal Medicine  Why: at 9:30 AM, Primary Care Physician  Contact information:  [address]  [address]  [place] [address] [phone]               Jonathon Kelly MD Follow up.    Specialties: Interventional Cardiology, Cardiology  Contact information:  [address]  [address]  [place] [address] [phone]                           Patient Instructions:      Ambulatory referral/consult to Cardiology   Standing Status: Future   Referral Priority: Routine Referral Type: Consultation   Referral Reason: Specialty Services Required   Requested Specialty: Cardiology   Number of Visits Requested: 1     Ambulatory referral/consult to Rheumatology   Standing Status: Future   Referral Priority: Routine Referral Type: Consultation  "  Referral Reason: Specialty Services Required   Requested Specialty: Rheumatology   Number of Visits Requested: 1       Significant Diagnostic Studies: Cardiac Graphics: Echocardiogram: Transthoracic echo (TTE) complete (Cupid Only):   Results for orders placed or performed during the hospital encounter of 03/10/25   Echo   Result Value Ref Range    BSA 1.83 m2    Johnson's Biplane MOD Ejection Fraction 59 %    A2C EF 50 %    A4C EF 68 %    LVOT stroke volume 81.4 cm3    LVIDd 4.8 3.5 - 6.0 cm    LV Systolic Volume 42 mL    LV Systolic Volume Index 22.5 mL/m2    LVIDs 3.2 2.1 - 4.0 cm    LV ESV A2C 36.26 mL    LV Diastolic Volume 108 mL    LV ESV A4C 35.04 mL    LV Diastolic Volume Index 57.75 mL/m2    LV EDV A2C 88.114724330644603 mL    LV EDV A4C 92.06 mL    Left Ventricular End Systolic Volume by Teichholz Method 41.98 mL    Left Ventricular End Diastolic Volume by Teichholz Method 108.46 mL    IVS 1.0 0.6 - 1.1 cm    LVOT diameter 2.1 cm    LVOT area 3.5 cm2    FS 33.3 28 - 44 %    Left Ventricle Relative Wall Thickness 0.42 cm    PW 1.0 0.6 - 1.1 cm    LV mass 170.2 g    LV Mass Index 91.0 g/m2    MV Peak E Jaden 1.06 m/s    TDI LATERAL 0.08 m/s    TDI SEPTAL 0.09 m/s    E/E' ratio 12 m/s    MV Peak A Jaden 0.68 m/s    TR Max Jaden 2.5 m/s    E/A ratio 1.56     E wave deceleration time 241 msec    MV "A" wave duration 137.528401736845257 msec    LV SEPTAL E/E' RATIO 11.8 m/s    LV LATERAL E/E' RATIO 13.3 m/s    PV Peak S Jaden 0.40 m/s    PV Peak D Jaden 0.69 m/s    Pulm vein S/D ratio 0.58     LVOT peak jaden 1.2 m/s    Left Ventricular Outflow Tract Mean Velocity 0.77 cm/s    Left Ventricular Outflow Tract Mean Gradient 2.90 mmHg    RV- cai basal diam 4.1 cm    RV S' 12.10 cm/s    TAPSE 2.41 cm    RV/LV Ratio 0.85 cm    LA Vol (MOD) 37 mL    DENZEL (MOD) 20 mL/m2    RA area length vol 30.58 mL    RA Area 12.6 cm2    RA Vol 31.82 mL    AV mean gradient 4 mmHg    AV peak gradient 8 mmHg    Ao peak jaden 1.4 m/s    Ao VTI " 27.1 cm    LVOT peak VTI 23.5 cm    AV valve area 3.0 cm²    AV Velocity Ratio 0.86     AV index (prosthetic) 0.87     SAM by Velocity Ratio 3.0 cm²    Mr max keisha 2.05 m/s    MV peak gradient 4 mmHg    MV stenosis pressure 1/2 time 70.01 ms    MV valve area p 1/2 method 3.14 cm2    MV valve area by continuity eq 3.65 cm2    MV VTI 22.3 cm    Triscuspid Valve Regurgitation Peak Gradient 26 mmHg    PV PEAK VELOCITY 0.99 m/s    PV peak gradient 4 mmHg    Pulmonary Valve Mean Velocity 0.80 m/s    Sinus 3.17 cm    STJ 2.56 cm    Ascending aorta 2.98 cm    IVC diameter 2.40 cm    Mean e' 0.09 m/s    ZLVIDS -0.04     ZLVIDD -0.82     LA area A4C 14.55 cm2    LA area A2C 15.02 cm2    TV resting pulmonary artery pressure 28 mmHg    RV TB RVSP 6 mmHg    Est. RA pres 3 mmHg    Narrative      Left Ventricle: The left ventricle is normal in size. Normal wall   thickness. Normal wall motion. There is normal systolic function with a   visually estimated ejection fraction of 55 - 60%. Quantitated ejection   fraction is 59%. There is normal diastolic function.    Right Ventricle: The right ventricle has mild enlargement. Systolic   function is normal.    Left Atrium: Normal left atrial size.    Aortic Valve: The aortic valve is a trileaflet valve. There is no   significant regurgitation.    Mitral Valve: There is trace regurgitation.    Tricuspid Valve: There is trace regurgitation.    Pulmonary Artery: The estimated pulmonary artery systolic pressure is   28 mmHg.    IVC/SVC: Normal venous pressure at 3 mmHg.    Pericardium: There is a moderate effusion. No indication of cardiac   tamponade. Evidence includes no chamber collapse.         Pending Diagnostic Studies:       Procedure Component Value Units Date/Time    DOM [[id number]] Collected: 03/11/25 1243    Order Status: Sent Lab Status: In process Updated: 03/11/25 2116    Specimen: Blood     Narrative:      Collection has been rescheduled by [facility] at 03/11/2025 09:01 Reason:    Patient unavailable gone for  test. Echo           Medications:  Reconciled Home Medications:      Medication List        START taking these medications      colchicine 0.6 mg tablet  Commonly known as: COLCRYS  Take 1 tablet (0.6 mg total) by mouth once daily.  Start taking on: March 13, 2025     ibuprofen 200 MG tablet  Commonly known as: ADVIL,MOTRIN  Take 4 tablets (800 mg total) by mouth every 8 (eight) hours for 3 days, THEN 3 tablets (600 mg total) every 8 (eight) hours for 3 days.  Start taking on: March 12, 2025            CONTINUE taking these medications      OPTICJERRYBER CHIQUITA LG MASK Spcr  Generic drug: inhalat.spacing dev,large mask  USE WITH INHALER     VENTOLIN HFA 90 mcg/actuation inhaler  Generic drug: albuterol  Inhale 2 puffs into the lungs every 4 (four) hours as needed for Wheezing or Shortness of Breath. Rescue            Indwelling Lines/Drains at time of discharge:   Lines/Drains/Airways       None                 Time spent on the discharge of patient: 31 minutes.  Physical exam stable.       Arianne Chan MD  Department of Hospital Medicine  [facility]

## 2025-03-12 NOTE — PLAN OF CARE
Discharge orders noted. No DME or Home Health ordered. PCP follow-up appointments previously scheduled. Family will transport home at discharge. No further Case Management needs.    Other Contacts    Name Relation Home Work Mobile   Nickie Snyder Mother   [phone]   Robert Yang Father   [phone]                 Name Relationship Specialty Phone Fax Address Order                Fracisco Phipps MD PCP - General Internal Medicine [phone] [address] [facility] [facility] [address] [place] [address]     Next Steps: Follow up on 3/21/2025  Instructions: at 9:30 AM, Primary Care Physician        Jonathon Kelly MD  Interventional Cardiology Cardiology [phone] [address] [place] [address]     Next Steps: Follow up  Instructions: Ambulatory Referral Placed.               03/12/25 1500   Final Note   Assessment Type Final Discharge Note   Anticipated Discharge Disposition Home   Hospital Resources/Appts/Education Provided Appointments scheduled and added to AVS   Post-Acute Status   Discharge Delays None known at this time     Kellen Cedeno RN    [phone]

## 2025-03-12 NOTE — SUBJECTIVE & OBJECTIVE
Past Medical History:   Diagnosis Date    ADHD (attention deficit hyperactivity disorder)     Depression     PTSD (post-traumatic stress disorder)        Past Surgical History:   Procedure Laterality Date    ADENOIDECTOMY      CIRCUMCISION      OPEN REDUCTION AND INTERNAL FIXATION (ORIF) OF FRACTURE OF CLAVICLE Left 9/20/2022    Procedure: ORIF, FRACTURE, CLAVICLE;  Surgeon: Satya Reynoso IV, MD;  Location: [facility];  Service: Orthopedics;  Laterality: Left;  Beach chair, padded darling stand (no arm krishnamurthy needed), Large C arm come in from head of bed; acumed clavicle system  zack notified 9/19 @ 1015 [facility]    TONSILLECTOMY      TYMPANOSTOMY TUBE PLACEMENT         Review of patient's allergies indicates:   Allergen Reactions    Omnicef [cefdinir] Edema       No current facility-administered medications on file prior to encounter.     Current Outpatient Medications on File Prior to Encounter   Medication Sig    albuterol (VENTOLIN HFA) 90 mcg/actuation inhaler Inhale 2 puffs into the lungs every 4 (four) hours as needed for Wheezing or Shortness of Breath. Rescue    OPTICHAMBER CHIQUITA LG MASK Spcr USE WITH INHALER     Family History       Problem Relation (Age of Onset)    ADD / ADHD Mother    Alcohol abuse Father    Asthma Brother    Bipolar disorder Maternal Uncle    Depression Mother    Drug abuse Father    Personality disorder Father    Physical abuse Paternal Grandmother    Oswald Brock sequence Brother          Tobacco Use    Smoking status: Every Day     Types: Cigarettes     Passive exposure: Never    Smokeless tobacco: Never   Substance and Sexual Activity    Alcohol use: No    Drug use: Yes     Types: Marijuana    Sexual activity: Never     Review of Systems   Constitutional: Negative. Negative for diaphoresis and fever.   HENT: Negative.     Eyes: Negative.    Cardiovascular:  Positive for chest pain. Negative for irregular heartbeat, leg swelling, near-syncope, orthopnea, palpitations, paroxysmal nocturnal  dyspnea and syncope.   Respiratory: Negative.  Negative for cough.    Endocrine: Negative.    Hematologic/Lymphatic: Negative.    Gastrointestinal: Negative.  Negative for nausea and vomiting.   Genitourinary: Negative.    Neurological:  Negative for dizziness, focal weakness, light-headedness and weakness.   Allergic/Immunologic: Negative.      Objective:     Vital Signs (Most Recent):  Temp: 97.8 °F (36.6 °C) (03/12/25 0746)  Pulse: 72 (03/12/25 0746)  Resp: 18 (03/12/25 0746)  BP: (!) 94/56 (03/12/25 0746)  SpO2: 97 % (03/12/25 0746) Vital Signs (24h Range):  Temp:  [97.3 °F (36.3 °C)-97.9 °F (36.6 °C)] 97.8 °F (36.6 °C)  Pulse:  [] 72  Resp:  [15-20] 18  SpO2:  [96 %-99 %] 97 %  BP: ()/(56-93) 94/56     Weight: 75 kg (165 lb 5.5 oz)  Body mass index is 21.23 kg/m².    SpO2: 97 %         Intake/Output Summary (Last 24 hours) at 3/12/2025 1005  Last data filed at 3/12/2025 0600  Gross per 24 hour   Intake 345 ml   Output --   Net 345 ml       Lines/Drains/Airways       Peripheral Intravenous Line  Duration                  Peripheral IV - Single Lumen 03/10/25 1736 20 G 1 in Left Antecubital 1 day                     Physical Exam  Constitutional:       General: He is not in acute distress.     Appearance: He is not diaphoretic.   HENT:      Head: Atraumatic.   Eyes:      General:         Right eye: No discharge.         Left eye: No discharge.   Cardiovascular:      Rate and Rhythm: Normal rate and regular rhythm.   Pulmonary:      Effort: Pulmonary effort is normal.      Breath sounds: Normal breath sounds.   Abdominal:      General: Bowel sounds are normal.      Palpations: Abdomen is soft.   Musculoskeletal:      Right lower leg: No edema.      Left lower leg: No edema.   Skin:     General: Skin is warm and dry.   Neurological:      Mental Status: He is alert and oriented to person, place, and time.          Significant Labs: BMP:   Recent Labs   Lab 03/10/25  1736 03/11/25  1243 03/12/25  0536  "  GLU 99 89 95    139 141   K 3.9 3.9 4.8    107 109   CO2 21* 23 24   BUN 12 10 12   CREATININE 0.8 0.8 0.8   CALCIUM 8.9 9.3 9.6   , CMP   Recent Labs   Lab 03/10/25  1736 03/11/25  1243 03/12/25  0553    139 141   K 3.9 3.9 4.8    107 109   CO2 21* 23 24   GLU 99 89 95   BUN 12 10 12   CREATININE 0.8 0.8 0.8   CALCIUM 8.9 9.3 9.6   PROT 7.4 7.3 7.5   ALBUMIN 3.8 3.6 3.7   BILITOT 0.7 0.5 0.4   ALKPHOS 63 62 59   AST 14 13 16   ALT 14 9* 17   ANIONGAP 11 9 8   , CBC   Recent Labs   Lab 03/10/25  1736 03/11/25  1243 03/12/25  0553   WBC 9.54 9.01 7.46   HGB 13.1* 13.8* 13.7*   HCT 39.6* 42.5 42.0    247 259   , INR No results for input(s): "INR", "PROTIME" in the last 48 hours., Lipid Panel No results for input(s): "CHOL", "HDL", "LDLCALC", "TRIG", "CHOLHDL" in the last 48 hours., and Troponin No results for input(s): "TROPONINIHS" in the last 48 hours.    Significant Imaging: Echocardiogram: Transthoracic echo (TTE) complete (Cupid Only):   Results for orders placed or performed during the hospital encounter of 03/10/25   Echo   Result Value Ref Range    BSA 1.83 m2    Johnson's Biplane MOD Ejection Fraction 59 %    A2C EF 50 %    A4C EF 68 %    LVOT stroke volume 81.4 cm3    LVIDd 4.8 3.5 - 6.0 cm    LV Systolic Volume 42 mL    LV Systolic Volume Index 22.5 mL/m2    LVIDs 3.2 2.1 - 4.0 cm    LV ESV A2C 36.26 mL    LV Diastolic Volume 108 mL    LV ESV A4C 35.04 mL    LV Diastolic Volume Index 57.75 mL/m2    LV EDV A2C 88.581817784795821 mL    LV EDV A4C 92.06 mL    Left Ventricular End Systolic Volume by Teichholz Method 41.98 mL    Left Ventricular End Diastolic Volume by Teichholz Method 108.46 mL    IVS 1.0 0.6 - 1.1 cm    LVOT diameter 2.1 cm    LVOT area 3.5 cm2    FS 33.3 28 - 44 %    Left Ventricle Relative Wall Thickness 0.42 cm    PW 1.0 0.6 - 1.1 cm    LV mass 170.2 g    LV Mass Index 91.0 g/m2    MV Peak E Jaden 1.06 m/s    TDI LATERAL 0.08 m/s    TDI SEPTAL 0.09 m/s    E/E' " "ratio 12 m/s    MV Peak A Jaden 0.68 m/s    TR Max Jaden 2.5 m/s    E/A ratio 1.56     E wave deceleration time 241 msec    MV "A" wave duration 137.914099668861719 msec    LV SEPTAL E/E' RATIO 11.8 m/s    LV LATERAL E/E' RATIO 13.3 m/s    PV Peak S Jaden 0.40 m/s    PV Peak D Jaden 0.69 m/s    Pulm vein S/D ratio 0.58     LVOT peak jaden 1.2 m/s    Left Ventricular Outflow Tract Mean Velocity 0.77 cm/s    Left Ventricular Outflow Tract Mean Gradient 2.90 mmHg    RV- cai basal diam 4.1 cm    RV S' 12.10 cm/s    TAPSE 2.41 cm    RV/LV Ratio 0.85 cm    LA Vol (MOD) 37 mL    DENZEL (MOD) 20 mL/m2    RA area length vol 30.58 mL    RA Area 12.6 cm2    RA Vol 31.82 mL    AV mean gradient 4 mmHg    AV peak gradient 8 mmHg    Ao peak jaden 1.4 m/s    Ao VTI 27.1 cm    LVOT peak VTI 23.5 cm    AV valve area 3.0 cm²    AV Velocity Ratio 0.86     AV index (prosthetic) 0.87     SAM by Velocity Ratio 3.0 cm²    Mr max jaden 2.05 m/s    MV peak gradient 4 mmHg    MV stenosis pressure 1/2 time 70.01 ms    MV valve area p 1/2 method 3.14 cm2    MV valve area by continuity eq 3.65 cm2    MV VTI 22.3 cm    Triscuspid Valve Regurgitation Peak Gradient 26 mmHg    PV PEAK VELOCITY 0.99 m/s    PV peak gradient 4 mmHg    Pulmonary Valve Mean Velocity 0.80 m/s    Sinus 3.17 cm    STJ 2.56 cm    Ascending aorta 2.98 cm    IVC diameter 2.40 cm    Mean e' 0.09 m/s    ZLVIDS -0.04     ZLVIDD -0.82     LA area A4C 14.55 cm2    LA area A2C 15.02 cm2    TV resting pulmonary artery pressure 28 mmHg    RV TB RVSP 6 mmHg    Est. RA pres 3 mmHg    Narrative      Left Ventricle: The left ventricle is normal in size. Normal wall   thickness. Normal wall motion. There is normal systolic function with a   visually estimated ejection fraction of 55 - 60%. Quantitated ejection   fraction is 59%. There is normal diastolic function.    Right Ventricle: The right ventricle has mild enlargement. Systolic   function is normal.    Left Atrium: Normal left atrial size.    " Aortic Valve: The aortic valve is a trileaflet valve. There is no   significant regurgitation.    Mitral Valve: There is trace regurgitation.    Tricuspid Valve: There is trace regurgitation.    Pulmonary Artery: The estimated pulmonary artery systolic pressure is   28 mmHg.    IVC/SVC: Normal venous pressure at 3 mmHg.    Pericardium: There is a moderate effusion. No indication of cardiac   tamponade. Evidence includes no chamber collapse.

## 2025-03-12 NOTE — CARE UPDATE
[address] [facility] [place] [address]  Phone: [phone]  Fax: [phone]          Return to Work/School      Patient: Shekhar Snyder  YOB: 2000   Date: 03/12/2025       To Whom It May Concern:     Shekhar Snyder was under my care from March 10th to 03/12/2025.  He may return to work tomorrow.  He has restrictions of no strenuous activity.      If you have any questions or concerns, or if I can be of further assistance, please do not hesitate to contact me.       Sincerely,  Arianne Chan MD

## 2025-03-12 NOTE — ASSESSMENT & PLAN NOTE
No tamponade on TTE  No clinical signs of tamponade  Hemodynamically stable  Recent Flu A  No indication for pericardiocentesis   Follow up with Dr Kelly per patient's preference   Will need rheumatology follow up   Strong FH of autoimmune disease

## 2025-03-13 ENCOUNTER — PATIENT OUTREACH (OUTPATIENT)
Dept: ADMINISTRATIVE | Facility: CLINIC | Age: 25
End: 2025-03-13
Payer: COMMERCIAL

## 2025-03-13 NOTE — PROGRESS NOTES
C3 nurse spoke with Shekhar Snyder  for a TCC post hospital discharge follow up call. The patient has a scheduled HOSFU appointment with Fracisco Phipps MD on next week per patient , not sandra exact date and time.

## 2025-03-13 NOTE — PROGRESS NOTES
C3 nurse attempted to contact Shekhar Snyder  for a TCC post hospital discharge follow up call. No answer. The patient does not have a scheduled HOSFU appointment, and the pt does not have an [facility] PCP.

## 2025-03-14 LAB
ANA PATTERN 1: NORMAL
ANA SER QL IF: POSITIVE
ANA TITR SER IF: NORMAL {TITER}
DSDNA AB SER-ACNC: NORMAL [IU]/ML

## 2025-03-18 LAB
ANTI SM ANTIBODY: 0.1 RATIO (ref 0–0.99)
ANTI SM/RNP ANTIBODY: 0.22 RATIO (ref 0–0.99)
ANTI-SM INTERPRETATION: NEGATIVE
ANTI-SM/RNP INTERPRETATION: NEGATIVE
ANTI-SSA ANTIBODY: 0.14 RATIO (ref 0–0.99)
ANTI-SSA INTERPRETATION: NEGATIVE
ANTI-SSB ANTIBODY: 0.08 RATIO (ref 0–0.99)
ANTI-SSB INTERPRETATION: NEGATIVE
DSDNA AB SER-ACNC: NORMAL [IU]/ML

## 2025-04-11 ENCOUNTER — OFFICE VISIT (OUTPATIENT)
Dept: CARDIOLOGY | Facility: CLINIC | Age: 25
End: 2025-04-11
Payer: COMMERCIAL

## 2025-04-11 ENCOUNTER — HOSPITAL ENCOUNTER (OUTPATIENT)
Dept: RADIOLOGY | Facility: HOSPITAL | Age: 25
Discharge: HOME | End: 2025-04-11
Attending: INTERNAL MEDICINE
Payer: COMMERCIAL

## 2025-04-11 ENCOUNTER — PATIENT MESSAGE (OUTPATIENT)
Dept: CARDIOLOGY | Facility: CLINIC | Age: 25
End: 2025-04-11

## 2025-04-11 ENCOUNTER — HOSPITAL ENCOUNTER (INPATIENT)
Facility: HOSPITAL | Age: 25
LOS: 10 days | Discharge: HOME | DRG: 314 | End: 2025-04-21
Attending: EMERGENCY MEDICINE | Admitting: INTERNAL MEDICINE
Payer: COMMERCIAL

## 2025-04-11 VITALS
HEIGHT: 74 IN | BODY MASS INDEX: 21.84 KG/M2 | DIASTOLIC BLOOD PRESSURE: 76 MMHG | HEART RATE: 102 BPM | WEIGHT: 170.19 LBS | SYSTOLIC BLOOD PRESSURE: 112 MMHG

## 2025-04-11 DIAGNOSIS — R07.9 CHEST PAIN: ICD-10-CM

## 2025-04-11 DIAGNOSIS — I31.9 PERICARDITIS: ICD-10-CM

## 2025-04-11 DIAGNOSIS — J90 PLEURAL EFFUSION: ICD-10-CM

## 2025-04-11 DIAGNOSIS — I31.9 PERICARDITIS, UNSPECIFIED CHRONICITY, UNSPECIFIED TYPE: ICD-10-CM

## 2025-04-11 DIAGNOSIS — I31.4 CARDIAC TAMPONADE: ICD-10-CM

## 2025-04-11 DIAGNOSIS — R93.89 ABNORMAL CT OF THE CHEST: ICD-10-CM

## 2025-04-11 DIAGNOSIS — I31.39 PERICARDIAL EFFUSION: Primary | ICD-10-CM

## 2025-04-11 DIAGNOSIS — I31.39 PERICARDIAL EFFUSION: ICD-10-CM

## 2025-04-11 DIAGNOSIS — I31.9 PERICARDITIS: Primary | ICD-10-CM

## 2025-04-11 LAB
ABORH RETYPE: NORMAL
ABSOLUTE EOSINOPHIL (OHS): 0.21 K/UL
ABSOLUTE MONOCYTE (OHS): 1.46 K/UL (ref 0.3–1)
ABSOLUTE NEUTROPHIL COUNT (OHS): 7.48 K/UL (ref 1.8–7.7)
ALBUMIN SERPL BCP-MCNC: 3.2 G/DL (ref 3.5–5.2)
ALP SERPL-CCNC: 64 UNIT/L (ref 40–150)
ALT SERPL W/O P-5'-P-CCNC: 28 UNIT/L (ref 10–44)
ANION GAP (OHS): 9 MMOL/L (ref 8–16)
AST SERPL-CCNC: 18 UNIT/L (ref 11–45)
BASOPHILS # BLD AUTO: 0.08 K/UL
BASOPHILS NFR BLD AUTO: 0.7 %
BILIRUB SERPL-MCNC: 1.2 MG/DL (ref 0.1–1)
BNP SERPL-MCNC: 74 PG/ML (ref 0–99)
BUN SERPL-MCNC: 14 MG/DL (ref 6–20)
CALCIUM SERPL-MCNC: 8.6 MG/DL (ref 8.7–10.5)
CHLORIDE SERPL-SCNC: 103 MMOL/L (ref 95–110)
CO2 SERPL-SCNC: 25 MMOL/L (ref 23–29)
CREAT SERPL-MCNC: 0.8 MG/DL (ref 0.5–1.4)
CRP SERPL-MCNC: 146 MG/L
ERYTHROCYTE [DISTWIDTH] IN BLOOD BY AUTOMATED COUNT: 13.2 % (ref 11.5–14.5)
GFR SERPLBLD CREATININE-BSD FMLA CKD-EPI: >60 ML/MIN/1.73/M2
GLUCOSE SERPL-MCNC: 97 MG/DL (ref 70–110)
HCT VFR BLD AUTO: 37.2 % (ref 40–54)
HGB BLD-MCNC: 12.2 GM/DL (ref 14–18)
IMM GRANULOCYTES # BLD AUTO: 0.05 K/UL (ref 0–0.04)
IMM GRANULOCYTES NFR BLD AUTO: 0.4 % (ref 0–0.5)
INDIRECT COOMBS: NORMAL
LYMPHOCYTES # BLD AUTO: 2.76 K/UL (ref 1–4.8)
MAGNESIUM SERPL-MCNC: 2.2 MG/DL (ref 1.6–2.6)
MCH RBC QN AUTO: 28.7 PG (ref 27–31)
MCHC RBC AUTO-ENTMCNC: 32.8 G/DL (ref 32–36)
MCV RBC AUTO: 88 FL (ref 82–98)
NUCLEATED RBC (/100WBC) (OHS): 0 /100 WBC
OHS QRS DURATION: 80 MS
OHS QTC CALCULATION: 430 MS
PLATELET # BLD AUTO: 325 K/UL (ref 150–450)
PMV BLD AUTO: 10.6 FL (ref 9.2–12.9)
POTASSIUM SERPL-SCNC: 3.7 MMOL/L (ref 3.5–5.1)
PROT SERPL-MCNC: 6.9 GM/DL (ref 6–8.4)
RBC # BLD AUTO: 4.25 M/UL (ref 4.6–6.2)
RELATIVE EOSINOPHIL (OHS): 1.7 %
RELATIVE LYMPHOCYTE (OHS): 22.9 % (ref 18–48)
RELATIVE MONOCYTE (OHS): 12.1 % (ref 4–15)
RELATIVE NEUTROPHIL (OHS): 62.2 % (ref 38–73)
RH BLD: NORMAL
SODIUM SERPL-SCNC: 137 MMOL/L (ref 136–145)
SPECIMEN OUTDATE: NORMAL
TROPONIN I SERPL HS-MCNC: 39 NG/L
TROPONIN I SERPL HS-MCNC: 55 NG/L
WBC # BLD AUTO: 12.04 K/UL (ref 3.9–12.7)

## 2025-04-11 PROCEDURE — 86900 BLOOD TYPING SEROLOGIC ABO: CPT | Performed by: STUDENT IN AN ORGANIZED HEALTH CARE EDUCATION/TRAINING PROGRAM

## 2025-04-11 PROCEDURE — 63600175 PHARM REV CODE 636 W HCPCS: Performed by: INTERNAL MEDICINE

## 2025-04-11 PROCEDURE — 87389 HIV-1 AG W/HIV-1&-2 AB AG IA: CPT | Performed by: HOSPITALIST

## 2025-04-11 PROCEDURE — 33017 PRCRD DRG 6YR+ W/O CGEN CAR: CPT | Mod: ,,, | Performed by: INTERNAL MEDICINE

## 2025-04-11 PROCEDURE — C1894 INTRO/SHEATH, NON-LASER: HCPCS | Performed by: INTERNAL MEDICINE

## 2025-04-11 PROCEDURE — 71046 X-RAY EXAM CHEST 2 VIEWS: CPT | Mod: 26,,, | Performed by: RADIOLOGY

## 2025-04-11 PROCEDURE — 63600175 PHARM REV CODE 636 W HCPCS: Mod: JZ,TB

## 2025-04-11 PROCEDURE — 99152 MOD SED SAME PHYS/QHP 5/>YRS: CPT | Mod: ,,, | Performed by: INTERNAL MEDICINE

## 2025-04-11 PROCEDURE — 99222 1ST HOSP IP/OBS MODERATE 55: CPT | Mod: 25,,, | Performed by: INTERNAL MEDICINE

## 2025-04-11 PROCEDURE — 87075 CULTR BACTERIA EXCEPT BLOOD: CPT | Performed by: HOSPITALIST

## 2025-04-11 PROCEDURE — 84311 SPECTROPHOTOMETRY: CPT | Performed by: HOSPITALIST

## 2025-04-11 PROCEDURE — 88305 TISSUE EXAM BY PATHOLOGIST: CPT | Mod: 26,,, | Performed by: PATHOLOGY

## 2025-04-11 PROCEDURE — 83735 ASSAY OF MAGNESIUM: CPT | Performed by: NURSE PRACTITIONER

## 2025-04-11 PROCEDURE — 89051 BODY FLUID CELL COUNT: CPT | Performed by: HOSPITALIST

## 2025-04-11 PROCEDURE — 99152 MOD SED SAME PHYS/QHP 5/>YRS: CPT | Performed by: INTERNAL MEDICINE

## 2025-04-11 PROCEDURE — 93010 ELECTROCARDIOGRAM REPORT: CPT | Mod: ,,, | Performed by: INTERNAL MEDICINE

## 2025-04-11 PROCEDURE — 87205 SMEAR GRAM STAIN: CPT | Performed by: HOSPITALIST

## 2025-04-11 PROCEDURE — 33017 PRCRD DRG 6YR+ W/O CGEN CAR: CPT | Performed by: INTERNAL MEDICINE

## 2025-04-11 PROCEDURE — 88305 TISSUE EXAM BY PATHOLOGIST: CPT | Mod: TC | Performed by: HOSPITALIST

## 2025-04-11 PROCEDURE — 82042 OTHER SOURCE ALBUMIN QUAN EA: CPT | Performed by: HOSPITALIST

## 2025-04-11 PROCEDURE — 87496 CYTOMEG DNA AMP PROBE: CPT | Performed by: STUDENT IN AN ORGANIZED HEALTH CARE EDUCATION/TRAINING PROGRAM

## 2025-04-11 PROCEDURE — 71046 X-RAY EXAM CHEST 2 VIEWS: CPT | Mod: TC

## 2025-04-11 PROCEDURE — 88112 CYTOPATH CELL ENHANCE TECH: CPT | Mod: 26,,, | Performed by: PATHOLOGY

## 2025-04-11 PROCEDURE — 86140 C-REACTIVE PROTEIN: CPT | Performed by: NURSE PRACTITIONER

## 2025-04-11 PROCEDURE — 25000003 PHARM REV CODE 250: Performed by: HOSPITALIST

## 2025-04-11 PROCEDURE — 87116 MYCOBACTERIA CULTURE: CPT | Performed by: HOSPITALIST

## 2025-04-11 PROCEDURE — 93005 ELECTROCARDIOGRAM TRACING: CPT

## 2025-04-11 PROCEDURE — 83880 ASSAY OF NATRIURETIC PEPTIDE: CPT | Performed by: NURSE PRACTITIONER

## 2025-04-11 PROCEDURE — 96374 THER/PROPH/DIAG INJ IV PUSH: CPT

## 2025-04-11 PROCEDURE — 0W9D30Z DRAINAGE OF PERICARDIAL CAVITY WITH DRAINAGE DEVICE, PERCUTANEOUS APPROACH: ICD-10-PCS | Performed by: INTERNAL MEDICINE

## 2025-04-11 PROCEDURE — 25000003 PHARM REV CODE 250: Performed by: INTERNAL MEDICINE

## 2025-04-11 PROCEDURE — 84155 ASSAY OF PROTEIN SERUM: CPT | Performed by: HOSPITALIST

## 2025-04-11 PROCEDURE — 99285 EMERGENCY DEPT VISIT HI MDM: CPT | Mod: 25

## 2025-04-11 PROCEDURE — 87070 CULTURE OTHR SPECIMN AEROBIC: CPT | Performed by: HOSPITALIST

## 2025-04-11 PROCEDURE — 93010 ELECTROCARDIOGRAM REPORT: CPT | Mod: 76,,, | Performed by: INTERNAL MEDICINE

## 2025-04-11 PROCEDURE — 87206 SMEAR FLUORESCENT/ACID STAI: CPT | Performed by: HOSPITALIST

## 2025-04-11 PROCEDURE — 27201423 OPTIME MED/SURG SUP & DEVICES STERILE SUPPLY: Performed by: INTERNAL MEDICINE

## 2025-04-11 PROCEDURE — 83615 LACTATE (LD) (LDH) ENZYME: CPT | Performed by: HOSPITALIST

## 2025-04-11 PROCEDURE — 84157 ASSAY OF PROTEIN OTHER: CPT | Performed by: HOSPITALIST

## 2025-04-11 PROCEDURE — 85025 COMPLETE CBC W/AUTO DIFF WBC: CPT | Performed by: NURSE PRACTITIONER

## 2025-04-11 PROCEDURE — 84484 ASSAY OF TROPONIN QUANT: CPT | Performed by: NURSE PRACTITIONER

## 2025-04-11 PROCEDURE — 20000000 HC ICU ROOM

## 2025-04-11 PROCEDURE — 25000003 PHARM REV CODE 250: Performed by: STUDENT IN AN ORGANIZED HEALTH CARE EDUCATION/TRAINING PROGRAM

## 2025-04-11 PROCEDURE — 83986 ASSAY PH BODY FLUID NOS: CPT | Performed by: INTERNAL MEDICINE

## 2025-04-11 PROCEDURE — 82945 GLUCOSE OTHER FLUID: CPT | Performed by: HOSPITALIST

## 2025-04-11 PROCEDURE — 80053 COMPREHEN METABOLIC PANEL: CPT | Performed by: NURSE PRACTITIONER

## 2025-04-11 RX ORDER — IBUPROFEN 800 MG/1
800 TABLET ORAL EVERY 6 HOURS PRN
Status: ON HOLD | COMMUNITY
End: 2025-04-14 | Stop reason: HOSPADM

## 2025-04-11 RX ORDER — SODIUM CHLORIDE 0.9 % (FLUSH) 0.9 %
10 SYRINGE (ML) INJECTION
Status: DISCONTINUED | OUTPATIENT
Start: 2025-04-11 | End: 2025-04-13

## 2025-04-11 RX ORDER — IBUPROFEN 400 MG/1
800 TABLET ORAL 3 TIMES DAILY
Status: DISCONTINUED | OUTPATIENT
Start: 2025-04-12 | End: 2025-04-11

## 2025-04-11 RX ORDER — SODIUM CHLORIDE 9 MG/ML
INJECTION, SOLUTION INTRAVENOUS CONTINUOUS
Status: ACTIVE | OUTPATIENT
Start: 2025-04-11 | End: 2025-04-11

## 2025-04-11 RX ORDER — ACETAMINOPHEN 325 MG/1
650 TABLET ORAL EVERY 4 HOURS PRN
Status: DISCONTINUED | OUTPATIENT
Start: 2025-04-11 | End: 2025-04-11

## 2025-04-11 RX ORDER — ALBUTEROL SULFATE 90 UG/1
2 INHALANT RESPIRATORY (INHALATION) EVERY 4 HOURS PRN
Status: DISCONTINUED | OUTPATIENT
Start: 2025-04-11 | End: 2025-04-21 | Stop reason: HOSPADM

## 2025-04-11 RX ORDER — COLCHICINE 0.6 MG/1
0.6 TABLET, FILM COATED ORAL 2 TIMES DAILY
Status: DISCONTINUED | OUTPATIENT
Start: 2025-04-11 | End: 2025-04-21 | Stop reason: HOSPADM

## 2025-04-11 RX ORDER — MIDAZOLAM HYDROCHLORIDE 1 MG/ML
INJECTION INTRAMUSCULAR; INTRAVENOUS
Status: DISCONTINUED | OUTPATIENT
Start: 2025-04-11 | End: 2025-04-11 | Stop reason: HOSPADM

## 2025-04-11 RX ORDER — PANTOPRAZOLE SODIUM 40 MG/1
40 TABLET, DELAYED RELEASE ORAL DAILY
Qty: 30 TABLET | Refills: 2 | Status: ON HOLD | OUTPATIENT
Start: 2025-04-11 | End: 2026-04-11

## 2025-04-11 RX ORDER — PANTOPRAZOLE SODIUM 40 MG/1
40 TABLET, DELAYED RELEASE ORAL DAILY
Status: DISCONTINUED | OUTPATIENT
Start: 2025-04-12 | End: 2025-04-21 | Stop reason: HOSPADM

## 2025-04-11 RX ORDER — LIDOCAINE HYDROCHLORIDE 20 MG/ML
INJECTION, SOLUTION INFILTRATION; PERINEURAL
Status: DISCONTINUED | OUTPATIENT
Start: 2025-04-11 | End: 2025-04-11 | Stop reason: HOSPADM

## 2025-04-11 RX ORDER — KETOROLAC TROMETHAMINE 30 MG/ML
10 INJECTION, SOLUTION INTRAMUSCULAR; INTRAVENOUS
Status: COMPLETED | OUTPATIENT
Start: 2025-04-11 | End: 2025-04-11

## 2025-04-11 RX ORDER — FENTANYL CITRATE 50 UG/ML
INJECTION, SOLUTION INTRAMUSCULAR; INTRAVENOUS
Status: DISCONTINUED | OUTPATIENT
Start: 2025-04-11 | End: 2025-04-11 | Stop reason: HOSPADM

## 2025-04-11 RX ORDER — SODIUM CHLORIDE 0.9 % (FLUSH) 0.9 %
10 SYRINGE (ML) INJECTION
Status: DISCONTINUED | OUTPATIENT
Start: 2025-04-11 | End: 2025-04-21 | Stop reason: HOSPADM

## 2025-04-11 RX ORDER — ONDANSETRON 8 MG/1
8 TABLET, ORALLY DISINTEGRATING ORAL EVERY 8 HOURS PRN
Status: DISCONTINUED | OUTPATIENT
Start: 2025-04-11 | End: 2025-04-19

## 2025-04-11 RX ORDER — COLCHICINE 0.6 MG/1
0.6 TABLET ORAL 2 TIMES DAILY
Qty: 60 TABLET | Refills: 6 | Status: ON HOLD | OUTPATIENT
Start: 2025-04-11 | End: 2026-04-11

## 2025-04-11 RX ORDER — IBUPROFEN 400 MG/1
800 TABLET ORAL EVERY 8 HOURS
Status: DISCONTINUED | OUTPATIENT
Start: 2025-04-11 | End: 2025-04-20

## 2025-04-11 RX ORDER — ACETAMINOPHEN 325 MG/1
650 TABLET ORAL EVERY 4 HOURS PRN
Status: DISCONTINUED | OUTPATIENT
Start: 2025-04-11 | End: 2025-04-17

## 2025-04-11 RX ADMIN — KETOROLAC TROMETHAMINE 10 MG: 30 INJECTION, SOLUTION INTRAMUSCULAR; INTRAVENOUS at 07:04

## 2025-04-11 RX ADMIN — SODIUM CHLORIDE: 9 INJECTION, SOLUTION INTRAVENOUS at 08:04

## 2025-04-11 RX ADMIN — IBUPROFEN 800 MG: 400 TABLET ORAL at 09:04

## 2025-04-11 RX ADMIN — COLCHICINE 0.6 MG: 0.6 TABLET, FILM COATED ORAL at 09:04

## 2025-04-11 RX ADMIN — SODIUM CHLORIDE: 9 INJECTION, SOLUTION INTRAVENOUS at 09:04

## 2025-04-11 NOTE — TELEPHONE ENCOUNTER
Contacted patient and his mother regarding chest x-ray findings.    Chest x-ray demonstrates a significant progression in his pericardial effusion compared to March.  Labs today are pending.  We will have patient go to the emergency room for further assessment.

## 2025-04-11 NOTE — ED NOTES
Pt had a vagal episode after being stuck with IV needle, became diaphoretic and passed out in tele triage area.. lasted for a few seconds. Pt is talking and AAO X4 in teletriage.

## 2025-04-11 NOTE — Clinical Note
The pericardiocentesis catheter was inserted and pericardial tap was performed under US guidance in the pericardial space.

## 2025-04-11 NOTE — Clinical Note
A percutaneous stick to the pericardial space was performed. Ultrasound guidance was used to obtain access.

## 2025-04-11 NOTE — PROGRESS NOTES
Patient ID: Shekhar Snyder is a 24 y.o. male.      CHIEF COMPLAINT:  - Presents for follow-up of acute pericarditis with moderate pericardial effusion, initially diagnosed 03/2025.    HPI:  24-year-old male initially developed symptoms 4-5 days prior to hospital admission on 03/10/2025, reporting chest pain when lying down and dyspnea. Symptoms persisted and worsened over four days, prompting him to seek medical attention.    In the hospital, he was diagnosed with a pericardial effusion/acute pericarditis and treated with high-dose ibuprofen (800 mg q8h). He was discharged on colchicine 0.6 mg daily and instructed to taper ibuprofen over a week or as pain lessened. He reports that pain never fully subsided, and he continued taking ibuprofen intermittently.  GI symptoms were limiting his ibuprofen use.    5 days ago, he had a significant flare-up of symptoms. Pain was significant enough that he left work on Monday night. He restarted high-dose ibuprofen, taking up to 5 tablets (1000 mg) q8h, 2-3 times daily. He has been out of work since Monday due to pain.    He reports that today has been one of his best days symptom-wise, which he attributes to resting and avoiding physical exertion. He still has discomfort when lying flat on his back, a symptom that has persisted since March. He also notes pain that radiates from his heart to his shoulder, particularly on symptomatic days, as well as pain in the neck area and discomfort when lying on it.    He had influenza A 1 month before his March hospitalization, which kept him sick for a whole week. This is suspected to be the trigger for his pericarditis.    He has a family history of recurrent pericarditis, with his mother also affected by the condition.    He denies any previous diagnoses of pericarditis prior to 03/2025 or a history of other relevant medical conditions or inflammatory diagnoses.    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 Blood pressure: 112/76.  Cardiovascular: Regular rate and rhythm. Normal S1 and S2. Pericardial friction rub present.  Lungs: Lung fields clear to auscultation bilaterally.  Neck: No jugular venous distention.  Extremities: No lower extremity edema.  LABS:  - CBC: 03/2025, normal  - CNP: 03/2025, normal  - BNP: 03/2025, normal  - Troponin: 03/2025, normal  - CRP: 03/2025, 98  TEST RESULTS  (IMAGING REVIEWED DURING  CLINIC VISIT):  - Echo 03/2025: Normal LV size and function EF 55-60%, normal diastology, normal RV size and function, no significant valve disease, moderate pericardial effusion with no evidence of tamponade  - CTA chest 03/2025: Normal size heart with moderate pericardial effusion, no evidence of PE  - CXR 03/2025: Borderline cardiac silhouette  - ECG April 11th 2025 sinus rhythm with no Q-waves or ST changes.  Normal AL NST segments.  - ECG 03/10/2025: Sinus tachycardia, possible diffuse AL depression and ST with early repolarization  - ECG 03/11/2025: SR with no Q waves or ST changes         Assessment & Plan    I31.9 Pericarditis  I31.39 Pericardial effusion    PERICARDITIS:  - Diagnosed ongoing acute pericarditis, likely triggered by Influenza A infection from a month prior.  - Managed as outpatient with medication adjustments and further testing.  - Explained that current symptoms are part of the same pericarditis process diagnosed in March, not a new occurrence.  - Informed patient that pericarditis usually improves with time and treatment.  - Increased colchicine to 0.6 mg twice daily  (previously daily).  - Started ibuprofen taper: 800 mg 3 times daily for 5 days, 600 mg 3 times daily for 5 days, 400 mg 3 times daily for 5 days, 200 mg 3 times daily for 5 days.  - Complete full 20-day ibuprofen taper even if symptoms improve.  - Started pantoprazole for stomach protection while on ibuprofen (2 refills provided, 1 month each).  - Ordered EKG, lab work to check renal function and trend other values, XR Chest to assess heart size, and echocardiogram (to be scheduled for next week).  - Contact office if symptoms flare up or if uncomfortable with medication regimen.  - Go to emergency department if symptoms become severe.  - Follow up when contacted about test results if change in management is required.  - agree with rheumatology referral given family history     PERICARDIAL EFFUSION:  - Ordered echocardiogram (to be scheduled for next week) to assess for potential pericardial effusion.    LIFESTYLE CHANGES:  - Patient to rest body and avoid strenuous activities like lifting heavy objects.         This note was generated with the assistance of ambient listening technology. Verbal consent was obtained by the patient and accompanying visitor(s) for the recording of patient appointment to facilitate this note. I attest to having reviewed and edited the generated note for accuracy, though some syntax or spelling errors may persist. Please contact the author of this note for any clarification.

## 2025-04-11 NOTE — FIRST PROVIDER EVALUATION
Emergency Department TeleTriage Encounter Note      CHIEF COMPLAINT    Chief Complaint   Patient presents with    Chest Pain     Pt has diagnosed pericarditis and was seen by cardiologist today with imaging. Pt was advised to come to the ER.       VITAL SIGNS   Initial Vitals [04/11/25 1818]   BP Pulse Resp Temp SpO2   (!) 134/91 107 18 98.2 °F (36.8 °C) 98 %      MAP       --            ALLERGIES    Review of patient's allergies indicates:   Allergen Reactions    Omnicef [cefdinir] Edema       PROVIDER TRIAGE NOTE  This is a teletriage evaluation of a 24 y.o. male presenting to the ED complaining of referred to ED for pericarditis by cardiology.    Alert, sitting upright, speaking in complete sentences.     Initial orders will be placed and care will be transferred to an alternate provider when patient is roomed for a full evaluation. Any additional orders and the final disposition will be determined by that provider.         ORDERS  Labs Reviewed - No data to display    ED Orders (720h ago, onward)      Start Ordered     Status Ordering Provider    04/11/25 2030 04/11/25 1829  Troponin I High Sensitivity #2  Once Timed         Ordered TRISTON HUITRON N.    04/11/25 1830 04/11/25 1829  Vital signs  Every 15 min         Ordered TRISTON HUITRON N.    04/11/25 1830 04/11/25 1829  Cardiac Monitoring - Adult  Continuous        Comments: Notify Physician If:    Ordered TRISTON HUITRON N.    04/11/25 1830 04/11/25 1829  Pulse Oximetry Continuous  Continuous         Ordered TRISTON HUITRON N.    04/11/25 1830 04/11/25 1829  Diet NPO  Diet effective now         Ordered TRISTON HUITRON N.    04/11/25 1830 04/11/25 1829  Saline lock IV  Once         Ordered TRISTON HUITRON N.    04/11/25 1830 04/11/25 1829  EKG 12-lead  Once        Comments: Do not perform if previously done during this visit/ triage    Ordered TRISTON HUITRON N.    04/11/25 1830 04/11/25 1829  CBC auto  differential  STAT         Ordered TRISTON HUITRON N.    04/11/25 1830 04/11/25 1829  Comprehensive metabolic panel  STAT         Ordered TRISTON HUITRON N.    04/11/25 1830 04/11/25 1829  Troponin I High Sensitivity #1  STAT         Ordered TRISTON HUITRON N.    04/11/25 1830 04/11/25 1829  B-Type natriuretic peptide (BNP)  STAT         Ordered TRISTON HUITRON N.    04/11/25 1830 04/11/25 1829  Magnesium  STAT         Ordered TOMY, TRISTON N.    04/11/25 1830 04/11/25 1829  C-reactive protein  STAT         Ordered TRISTON HUITRON N.    04/11/25 1820 04/11/25 1820  EKG 12-lead  Once         Completed by RADHA ARGUELLO on 4/11/2025 at  6:28 PM MICKEY WILKS              Virtual Visit Note: The provider triage portion of this emergency department evaluation and documentation was performed via [employer], a HIPAA-compliant telemedicine application, in concert with a tele-presenter in the room. A face to face patient evaluation with one of my colleagues will occur once the patient is placed in an emergency department room.      DISCLAIMER: This note was prepared with [employer] voice recognition transcription software. Garbled syntax, mangled pronouns, and other bizarre constructions may be attributed to that software system.

## 2025-04-12 LAB
ABSOLUTE EOSINOPHIL (OHS): 0.39 K/UL
ABSOLUTE MONOCYTE (OHS): 1.06 K/UL (ref 0.3–1)
ABSOLUTE NEUTROPHIL COUNT (OHS): 3.55 K/UL (ref 1.8–7.7)
ALBUMIN SERPL BCP-MCNC: 2.8 G/DL (ref 3.5–5.2)
ALP SERPL-CCNC: 59 UNIT/L (ref 40–150)
ALT SERPL W/O P-5'-P-CCNC: 28 UNIT/L (ref 10–44)
ANION GAP (OHS): 9 MMOL/L (ref 8–16)
APPEARANCE FLD: NORMAL
APTT PPP: 26.7 SECONDS (ref 21–32)
AST SERPL-CCNC: 20 UNIT/L (ref 11–45)
BASOPHILS # BLD AUTO: 0.06 K/UL
BASOPHILS NFR BLD AUTO: 0.7 %
BILIRUB SERPL-MCNC: 0.9 MG/DL (ref 0.1–1)
BUN SERPL-MCNC: 14 MG/DL (ref 6–20)
CALCIUM SERPL-MCNC: 8.2 MG/DL (ref 8.7–10.5)
CHLORIDE SERPL-SCNC: 107 MMOL/L (ref 95–110)
CO2 SERPL-SCNC: 24 MMOL/L (ref 23–29)
COLOR FLD: NORMAL
CREAT SERPL-MCNC: 0.7 MG/DL (ref 0.5–1.4)
CRP SERPL-MCNC: 123.66 MG/L
EOSINOPHIL NFR FLD MANUAL: 2 %
ERYTHROCYTE [DISTWIDTH] IN BLOOD BY AUTOMATED COUNT: 13.1 % (ref 11.5–14.5)
GFR SERPLBLD CREATININE-BSD FMLA CKD-EPI: >60 ML/MIN/1.73/M2
GLUCOSE SERPL-MCNC: 89 MG/DL (ref 70–110)
GRAM STN SPEC: NORMAL
GRAM STN SPEC: NORMAL
HCT VFR BLD AUTO: 39.9 % (ref 40–54)
HGB BLD-MCNC: 13 GM/DL (ref 14–18)
HIV 1+2 AB+HIV1 P24 AG SERPL QL IA: NORMAL
IMM GRANULOCYTES # BLD AUTO: 0.01 K/UL (ref 0–0.04)
IMM GRANULOCYTES NFR BLD AUTO: 0.1 % (ref 0–0.5)
LDH SERPL-CCNC: 176 U/L (ref 110–260)
LYMPHOCYTES # BLD AUTO: 3.1 K/UL (ref 1–4.8)
LYMPHOCYTES NFR FLD MANUAL: 43 %
MAGNESIUM SERPL-MCNC: 2.3 MG/DL (ref 1.6–2.6)
MCH RBC QN AUTO: 28.3 PG (ref 27–31)
MCHC RBC AUTO-ENTMCNC: 32.6 G/DL (ref 32–36)
MCV RBC AUTO: 87 FL (ref 82–98)
MONOS+MACROS NFR FLD MANUAL: 19 %
NEUTROPHILS NFR FLD MANUAL: 36 %
NUCLEATED RBC (/100WBC) (OHS): 0 /100 WBC
OHS QRS DURATION: 78 MS
OHS QRS DURATION: 82 MS
OHS QTC CALCULATION: 435 MS
OHS QTC CALCULATION: 445 MS
PLATELET # BLD AUTO: 318 K/UL (ref 150–450)
PMV BLD AUTO: 10.6 FL (ref 9.2–12.9)
POTASSIUM SERPL-SCNC: 3.4 MMOL/L (ref 3.5–5.1)
PROT FLD-MCNC: 5.2 G/DL
PROT SERPL-MCNC: 6 GM/DL (ref 6–8.4)
PROT SERPL-MCNC: 6.3 GM/DL (ref 6–8.4)
RBC # BLD AUTO: 4.6 M/UL (ref 4.6–6.2)
RELATIVE EOSINOPHIL (OHS): 4.8 %
RELATIVE LYMPHOCYTE (OHS): 37.9 % (ref 18–48)
RELATIVE MONOCYTE (OHS): 13 % (ref 4–15)
RELATIVE NEUTROPHIL (OHS): 43.5 % (ref 38–73)
SODIUM SERPL-SCNC: 140 MMOL/L (ref 136–145)
TROPONIN I SERPL HS-MCNC: 16 NG/L
WBC # BLD AUTO: 8.17 K/UL (ref 3.9–12.7)
WBC # FLD: 646 /CU MM

## 2025-04-12 PROCEDURE — 25000242 PHARM REV CODE 250 ALT 637 W/ HCPCS: Performed by: STUDENT IN AN ORGANIZED HEALTH CARE EDUCATION/TRAINING PROGRAM

## 2025-04-12 PROCEDURE — 93005 ELECTROCARDIOGRAM TRACING: CPT

## 2025-04-12 PROCEDURE — 25000003 PHARM REV CODE 250

## 2025-04-12 PROCEDURE — 94640 AIRWAY INHALATION TREATMENT: CPT

## 2025-04-12 PROCEDURE — 25000003 PHARM REV CODE 250: Performed by: STUDENT IN AN ORGANIZED HEALTH CARE EDUCATION/TRAINING PROGRAM

## 2025-04-12 PROCEDURE — 85025 COMPLETE CBC W/AUTO DIFF WBC: CPT | Performed by: STUDENT IN AN ORGANIZED HEALTH CARE EDUCATION/TRAINING PROGRAM

## 2025-04-12 PROCEDURE — 86141 C-REACTIVE PROTEIN HS: CPT | Performed by: STUDENT IN AN ORGANIZED HEALTH CARE EDUCATION/TRAINING PROGRAM

## 2025-04-12 PROCEDURE — 84484 ASSAY OF TROPONIN QUANT: CPT | Performed by: STUDENT IN AN ORGANIZED HEALTH CARE EDUCATION/TRAINING PROGRAM

## 2025-04-12 PROCEDURE — 27100098 HC SPACER

## 2025-04-12 PROCEDURE — 99900035 HC TECH TIME PER 15 MIN (STAT)

## 2025-04-12 PROCEDURE — 94761 N-INVAS EAR/PLS OXIMETRY MLT: CPT

## 2025-04-12 PROCEDURE — 80053 COMPREHEN METABOLIC PANEL: CPT | Performed by: STUDENT IN AN ORGANIZED HEALTH CARE EDUCATION/TRAINING PROGRAM

## 2025-04-12 PROCEDURE — 85730 THROMBOPLASTIN TIME PARTIAL: CPT | Performed by: STUDENT IN AN ORGANIZED HEALTH CARE EDUCATION/TRAINING PROGRAM

## 2025-04-12 PROCEDURE — 93010 ELECTROCARDIOGRAM REPORT: CPT | Mod: ,,, | Performed by: INTERNAL MEDICINE

## 2025-04-12 PROCEDURE — 27000221 HC OXYGEN, UP TO 24 HOURS

## 2025-04-12 PROCEDURE — 99291 CRITICAL CARE FIRST HOUR: CPT | Mod: ,,, | Performed by: INTERNAL MEDICINE

## 2025-04-12 PROCEDURE — 20000000 HC ICU ROOM

## 2025-04-12 PROCEDURE — 83735 ASSAY OF MAGNESIUM: CPT | Performed by: STUDENT IN AN ORGANIZED HEALTH CARE EDUCATION/TRAINING PROGRAM

## 2025-04-12 RX ORDER — ALBUTEROL SULFATE 2.5 MG/.5ML
2.5 SOLUTION RESPIRATORY (INHALATION) EVERY 4 HOURS PRN
Status: DISCONTINUED | OUTPATIENT
Start: 2025-04-12 | End: 2025-04-13

## 2025-04-12 RX ADMIN — POTASSIUM BICARBONATE 40 MEQ: 391 TABLET, EFFERVESCENT ORAL at 08:04

## 2025-04-12 RX ADMIN — COLCHICINE 0.6 MG: 0.6 TABLET, FILM COATED ORAL at 09:04

## 2025-04-12 RX ADMIN — ACETAMINOPHEN 650 MG: 325 TABLET ORAL at 05:04

## 2025-04-12 RX ADMIN — POTASSIUM BICARBONATE 40 MEQ: 391 TABLET, EFFERVESCENT ORAL at 01:04

## 2025-04-12 RX ADMIN — IBUPROFEN 800 MG: 400 TABLET ORAL at 09:04

## 2025-04-12 RX ADMIN — IBUPROFEN 800 MG: 400 TABLET ORAL at 05:04

## 2025-04-12 RX ADMIN — PANTOPRAZOLE SODIUM 40 MG: 40 TABLET, DELAYED RELEASE ORAL at 08:04

## 2025-04-12 RX ADMIN — ALBUTEROL SULFATE 2 PUFF: 108 AEROSOL, METERED RESPIRATORY (INHALATION) at 04:04

## 2025-04-12 RX ADMIN — COLCHICINE 0.6 MG: 0.6 TABLET, FILM COATED ORAL at 08:04

## 2025-04-12 RX ADMIN — ALBUTEROL SULFATE 2 PUFF: 108 AEROSOL, METERED RESPIRATORY (INHALATION) at 06:04

## 2025-04-12 RX ADMIN — IBUPROFEN 800 MG: 400 TABLET ORAL at 01:04

## 2025-04-12 NOTE — EICU
Intervention Initiated From:  [facility] / EICU    Nilda intervened regarding:  Rounding (Video assessment)    Virtual ICU Admission    Admit Date: 2025  LOS: 0  Code Status: Full Code   : 2000  Bed: 39681/42502 A:     Diagnosis: <principal problem not specified>    Patient  has a past medical history of ADHD (attention deficit hyperactivity disorder), Depression, and PTSD (post-traumatic stress disorder).    Last VS: /76   Pulse 93   Temp 98.2 °F (36.8 °C) (Oral)   Resp (!) 25   Wt 77.1 kg (170 lb)   SpO2 (!) 93%   BMI 21.83 kg/m²       VICU Review    VICU nurse assessment :  [place] completed, LDA documentation reconciliation completed, and VTE prophylaxis review                CHUCK

## 2025-04-12 NOTE — H&P
H&P  [facility]  Cardiology      Shekhar Snyder  YOB: 2000  Medical Record Number:  [id number]  Attending Physician:  Ernesto Wing MD   Date of Admission: 4/11/2025       Hospital Day:  0  Current Principal Problem:  Massive pericardial effusion     HISTORY:     Mr Snyder comes to the ED from outpatient cardiology office with Dr Kelly for massive pericardial effusion.     24-year-old male with PMH for MDD, ADHD and PTSD who initially developed symptoms 4-5 days prior to hospital admission on 03/10/2025, reporting chest pain when lying down and dyspnea. Symptoms persisted and worsened over four days, prompting him to seek medical attention.     In the hospital, he was diagnosed with a pericardial effusion/acute pericarditis and treated with high-dose ibuprofen (800 mg q8h). He was discharged on colchicine 0.6 mg daily and instructed to taper ibuprofen over a week or as pain lessened. He reports that pain never fully subsided, and he continued taking ibuprofen intermittently.  GI symptoms were limiting his ibuprofen use.     5 days ago, he had a significant flare-up of symptoms. Pain was significant enough that he left work on Monday night. He restarted high-dose ibuprofen, taking up to 5 tablets (1000 mg) q8h, 2-3 times daily. He has been out of work since Monday due to pain.     He reports that today has been one of his best days symptom-wise, which he attributes to resting and avoiding physical exertion. He still has discomfort when lying flat on his back, a symptom that has persisted since March. He also notes pain that radiates from his heart to his shoulder, particularly on symptomatic days, as well as pain in the neck area and discomfort when lying on it.     He had influenza A 1 month before his March hospitalization, which kept him sick for a whole week. This is suspected to be the trigger for his pericarditis.     He has a family history of recurrent pericarditis, with his mother  also affected by the condition.     He denies any previous diagnoses of pericarditis prior to 03/2025 or a history of other relevant medical conditions or inflammatory diagnoses.     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 Authorizing Provider   colchicine (COLCRYS) 0.6 mg tablet Take 1 tablet (0.6 mg total) by mouth 2 (two) times daily. 4/11/25 4/11/26 Yes Jonathon Kelly MD   ibuprofen (ADVIL,MOTRIN) 800 MG tablet Take 800 mg by mouth every 6 (six) hours as needed for Pain.   Yes Provider, Historical   albuterol (VENTOLIN HFA) 90 mcg/actuation inhaler Inhale 2 puffs into the lungs every 4 (four) hours as needed for Wheezing or Shortness of Breath. Rescue  Patient not taking: Reported on 4/11/2025    Provider, Historical   OPTICHAMBER CHIQUITA LG MASK Spcr USE WITH INHALER  Patient not taking: Reported on 4/11/2025 2/6/25   Provider, Historical   pantoprazole (PROTONIX) 40 MG tablet Take 1 tablet (40 mg total) by mouth once daily. 4/11/25 4/11/26  Jonathon Kelly MD   colchicine (COLCRYS) 0.6 mg tablet Take 1 tablet (0.6 mg total) by mouth once daily. 3/13/25 4/11/25  Arianne Chan MD         Medications - Current  Scheduled Meds:   colchicine  0.6 mg Oral BID    ibuprofen  800 mg Oral Q8H    [START ON 4/12/2025] pantoprazole  40 mg Oral Daily     Continuous Infusions:   0.9% NaCl   Intravenous Continuous 100 mL/hr at 04/11/25 2008 New Bag at 04/11/25 2008    0.9% NaCl   Intravenous Continuous         PRN  "Meds:.  Current Facility-Administered Medications:     acetaminophen, 650 mg, Oral, Q4H PRN    albuterol, 2 puff, Inhalation, Q4H PRN    ondansetron, 8 mg, Oral, Q8H PRN    sodium chloride 0.9%, 10 mL, Intravenous, PRN      Allergies  Review of patient's allergies indicates:   Allergen Reactions    Omnicef [cefdinir] Edema         Past Medical History  Past Medical History:   Diagnosis Date    ADHD (attention deficit hyperactivity disorder)     Depression     PTSD (post-traumatic stress disorder)          Past Surgical History  Past Surgical History:   Procedure Laterality Date    ADENOIDECTOMY      CIRCUMCISION      OPEN REDUCTION AND INTERNAL FIXATION (ORIF) OF FRACTURE OF CLAVICLE Left 9/20/2022    Procedure: ORIF, FRACTURE, CLAVICLE;  Surgeon: Satya Reynoso IV, MD;  Location: [facility];  Service: Orthopedics;  Laterality: Left;  Beach chair, padded darling stand (no arm krishnamurthy needed), Large C arm come in from head of bed; acumed clavicle system  dian notified 9/19 @ [facility]    TONSILLECTOMY      TYMPANOSTOMY TUBE PLACEMENT           Social History  Social History     Socioeconomic History    Marital status: Single   Tobacco Use    Smoking status: Every Day     Current packs/day: 0.50     Average packs/day: 0.5 packs/day for 5.3 years (2.6 ttl pk-yrs)     Types: Cigarettes, Vaping with nicotine     Start date: 2020     Passive exposure: Never    Smokeless tobacco: Never    Tobacco comments:     Pt states that he started smoking cigarettes and vaping at age 19, but then quit using nicotine in any form 1 month ago when he started feeling ill.  EMR to be updated to reflect "Former Smoker" status when pt remains without relapse as of 2/12//2026.   Substance and Sexual Activity    Alcohol use: No    Drug use: Yes     Types: Marijuana    Sexual activity: Never   Social History Narrative    Lives at home with Mom and Brother, Going to [employer], will be in 6th grade.  One dog at home.     Social Drivers of Health " "    Financial Resource Strain: Low Risk  (3/11/2025)    Overall Financial Resource Strain (CARDIA)     Difficulty of Paying Living Expenses: Not hard at all   Food Insecurity: No Food Insecurity (3/11/2025)    Hunger Vital Sign     Worried About Running Out of Food in the Last Year: Never true     Ran Out of Food in the Last Year: Never true   Stress: No Stress Concern Present (3/11/2025)    [employer] - Occupational Stress Questionnaire     Feeling of Stress : Not at all   Housing Stability: Low Risk  (3/11/2025)    Housing Stability Vital Sign     Unable to Pay for Housing in the Last Year: No     Homeless in the Last Year: No         ROS:     As mentioned in HPI      PHYSICAL EXAM:     Vital Signs  Vitals  Temp: 98.2 °F (36.8 °C)  Temp Source: Oral  Pulse: 93  Resp: (!) 25  SpO2: (!) 93 %  Flow (L/min) (Oxygen Therapy): 3  BP: 119/76  MAP (mmHg): 93          24 Hour VS Range    Temp:  [98.2 °F (36.8 °C)-98.3 °F (36.8 °C)]   Pulse:  []   Resp:  [18-29]   BP: (112-134)/(63-91)   SpO2:  [93 %-98 %]     Intake/Output Summary (Last 24 hours) at 4/11/2025 2131  Last data filed at 4/11/2025 2119  Gross per 24 hour   Intake --   Output 1643 ml   Net -1643 ml         Vitals: Pulse: 102. Blood pressure: 112/76.  Cardiovascular: Regular rate and rhythm. Normal S1 and S2. Pericardial friction rub present.  Lungs: Lung fields clear to auscultation bilaterally.  Neck: No jugular venous distention.  Extremities: No lower extremity edema.    DATA:       Recent Labs   Lab 04/11/25  1412 04/11/25  1841   WBC 12.32 12.04   HGB 12.1* 12.2*   HCT 38.2* 37.2*    325        No results for input(s): "PROTIME", "INR" in the last 168 hours.     Recent Labs   Lab 04/11/25  1412 04/11/25  1841    137   K 3.5 3.7    103   CO2 25 25   BUN 14 14   CREATININE 0.8 0.8   ANIONGAP 9 9   CALCIUM 8.5* 8.6*        Recent Labs   Lab 04/11/25  1412 04/11/25  1841   ALBUMIN 3.2* 3.2*   BILITOT 1.1* " "1.2*   ALKPHOS 65 64   AST 23 18   ALT 33 28        No results for input(s): "TROPONINI" in the last 168 hours.     BNP (pg/mL)   Date Value   04/11/2025 74   03/10/2025 11       No results for input(s): "LABBLOO" in the last 168 hours.       ASSESSMENT/PLAN:     #Massive pericardial effusion with tamponade physiology  -symptomatic with pain and SOB  -CRP elevated  -EKG unremarkable  -hemodynamics stable, however, echo showed massive effusion, CVP 15, RV diastolic collapse and early cardiac tamponade  -underwent STAT pericardiocentesis and 1.6 L fluid removed  -sample sent to lab for studies  -drain left in, admitted to ICU  -Colchicine and ibuprofen ordered, etiology of the effusion unknown    PUD Ppx: Protonix  DVT Ppx: low risk  Full code    Signed:  Clemente Dye  Cardiology Fellow PGY-6  4/11/2025  9:31 PM   [facility] [place] [facility], [place]    "

## 2025-04-12 NOTE — ED PROVIDER NOTES
Encounter Date: 4/11/2025       History     Chief Complaint   Patient presents with    Chest Pain     Pt has diagnosed pericarditis and was seen by cardiologist today with imaging. Pt was advised to come to the ER.     Mr Snyder comes to the ED from outpatient cardiology office with Dr Kelly for massive pericardial effusion. Had follow up with cardiology today who referred to ED after CXR was obtained.     HPI:  24-year-old male initially developed symptoms 4-5 days prior to hospital admission on 03/10/2025, reporting chest pain when lying down and dyspnea. Symptoms persisted and worsened over four days, prompting him to seek medical attention.     In the hospital, he was diagnosed with a pericardial effusion/acute pericarditis and treated with high-dose ibuprofen (800 mg q8h). He was discharged on colchicine 0.6 mg daily and instructed to taper ibuprofen over a week or as pain lessened. He reports that pain never fully subsided, and he continued taking ibuprofen intermittently.  GI symptoms were limiting his ibuprofen use.     5 days ago, he had a significant flare-up of symptoms. Pain was significant enough that he left work on Monday night. He restarted high-dose ibuprofen, taking up to 5 tablets (1000 mg) q8h, 2-3 times daily. He has been out of work since Monday due to pain.     He reports that today has been one of his best days symptom-wise, which he attributes to resting and avoiding physical exertion. He still has discomfort when lying flat on his back, a symptom that has persisted since March. He also notes pain that radiates from his heart to his shoulder, particularly on symptomatic days, as well as pain in the neck area and discomfort when lying on it.     He had influenza A 1 month before his March hospitalization, which kept him sick for a whole week. This is suspected to be the trigger for his pericarditis.     He has a family history of recurrent pericarditis, with his mother also affected by the  condition.     He denies any previous diagnoses of pericarditis prior to 03/2025 or a history of other relevant medical conditions or inflammatory diagnoses.    The history is provided by the patient and medical records.     Review of patient's allergies indicates:   Allergen Reactions    Omnicef [cefdinir] Edema     Past Medical History:   Diagnosis Date    ADHD (attention deficit hyperactivity disorder)     Depression     PTSD (post-traumatic stress disorder)      Past Surgical History:   Procedure Laterality Date    ADENOIDECTOMY      CIRCUMCISION      OPEN REDUCTION AND INTERNAL FIXATION (ORIF) OF FRACTURE OF CLAVICLE Left 9/20/2022    Procedure: ORIF, FRACTURE, CLAVICLE;  Surgeon: Satya Reynoso IV, MD;  Location: [facility] OR;  Service: Orthopedics;  Laterality: Left;  Beach chair, padded darling stand (no arm krishnamurthy needed), Large C arm come in from head of bed; acumed clavicle system  dian notified 9/19 @ 101[facility]    TONSILLECTOMY      TYMPANOSTOMY TUBE PLACEMENT       Family History   Problem Relation Name Age of Onset    Oswald Vinod sequence Brother Shekhar morillo     Asthma Brother Shekhar morillo     Bipolar disorder Maternal Uncle      Alcohol abuse Father      Drug abuse Father      Personality disorder Father      ADD / ADHD Mother      Depression Mother      Physical abuse Paternal Grandmother       Social History[1]  Review of Systems  See HPI  Physical Exam     Initial Vitals [04/11/25 1818]   BP Pulse Resp Temp SpO2   (!) 134/91 107 18 98.2 °F (36.8 °C) 98 %      MAP       --         Physical Exam    Vitals reviewed.  Constitutional: He appears well-developed and well-nourished. He is not diaphoretic. No distress.   HENT:   Head: Normocephalic and atraumatic.   Neck: Neck supple.   Cardiovascular:  Normal rate and regular rhythm.     Exam reveals friction rub.       Pulmonary/Chest: Breath sounds normal. No respiratory distress. He has no wheezes. He has no rhonchi. He has no rales.    Musculoskeletal:      Cervical back: Neck supple.      Right lower leg: No swelling.      Left lower leg: No swelling.     Neurological: He is alert and oriented to person, place, and time.   Psychiatric: He has a normal mood and affect.         ED Course   Critical Care    Date/Time: 4/11/2025 7:50 PM    Performed by: Cynthia Fernandes PA-C  Authorized by: Linda Abraham MD  Total critical care time (exclusive of procedural time) : 30 minutes  Critical care was necessary to treat or prevent imminent or life-threatening deterioration of the following conditions: cardiac failure.  Critical care was time spent personally by me on the following activities: blood draw for specimens, development of treatment plan with patient or surrogate, discussions with consultants, evaluation of patient's response to treatment, interpretation of cardiac output measurements, examination of patient, obtaining history from patient or surrogate, ordering and performing treatments and interventions, ordering and review of laboratory studies, ordering and review of radiographic studies, pulse oximetry, re-evaluation of patient's condition and review of old charts.        Labs Reviewed   COMPREHENSIVE METABOLIC PANEL - Abnormal       Result Value    Sodium 137      Potassium 3.7      Chloride 103      CO2 25      Glucose 97      BUN 14      Creatinine 0.8      Calcium 8.6 (*)     Protein Total 6.9      Albumin 3.2 (*)     Bilirubin Total 1.2 (*)     ALP 64      AST 18      ALT 28      Anion Gap 9      eGFR >60     TROPONIN I HIGH SENSITIVITY - Abnormal    Troponin High Sensitive 55 (*)    C-REACTIVE PROTEIN - Abnormal    .0 (*)    CBC WITH DIFFERENTIAL - Abnormal    WBC 12.04      RBC 4.25 (*)     HGB 12.2 (*)     HCT 37.2 (*)     MCV 88      MCH 28.7      MCHC 32.8      RDW 13.2      Platelet Count 325      MPV 10.6      Nucleated RBC 0      Neut % 62.2      Lymph % 22.9      Mono % 12.1      Eos % 1.7      Basophil % 0.7       Imm Grans % 0.4      Neut # 7.48      Lymph # 2.76      Mono # 1.46 (*)     Eos # 0.21      Baso # 0.08      Imm Grans # 0.05 (*)    B-TYPE NATRIURETIC PEPTIDE - Normal    BNP 74     MAGNESIUM - Normal    Magnesium  2.2     CULTURE, ANAEROBIC   CULTURE, AEROBIC  (SPECIFY SOURCE)   AFB CULTURE & SMEAR   CBC W/ AUTO DIFFERENTIAL    Narrative:     The following orders were created for panel order CBC auto differential.  Procedure                               Abnormality         Status                     ---------                               -----------         ------                     CBC with Differential[[id number]]       Abnormal            Final result                 Please view results for these tests on the individual orders.   CYTOLOGY SPECIMEN- MEDICAL CYTOLOGY (FLUID/WASH/BRUSH)     EKG Readings: (Independently Interpreted)   Initial Reading: No STEMI. Rhythm: Sinus Tachycardia. Heart Rate: 101. Ectopy: No Ectopy. Conduction: Normal. ST Segments: Normal ST Segments. T Waves: Normal.     ECG Results              EKG 12-lead (Final result)        Collection Time Result Time QRS Duration [facility] QTC Calculation    04/11/25 19:12:37 04/12/25 07:58:46 82 445                     Final result by Interface, Lab In [facility] (04/12/25 07:58:56)                   Narrative:    Test Reason : R07.9,    Vent. Rate :  93 BPM     Atrial Rate :  93 BPM     P-R Int : 134 ms          QRS Dur :  82 ms      QT Int : 358 ms       P-R-T Axes :  36  54  37 degrees    QTcB Int : 445 ms    Normal sinus rhythm with sinus arrhythmia  Normal ECG  When compared with ECG of 11-Apr-2025 18:24,  No significant change was found  Confirmed by Jean Adair ([id number]) on 4/12/2025 7:58:41 AM    Referred By: AAAREFERRAL SELF           Confirmed By: Jean Adair                                     EKG 12-lead (Final result)        Collection Time Result Time QRS Duration [facility] QTC Calculation    04/11/25 18:24:18 04/12/25 07:58:42 78 435                      Final result by Interface, Lab In [facility] (04/12/25 07:58:51)                   Narrative:    Test Reason : R07.9,    Vent. Rate : 101 BPM     Atrial Rate : 101 BPM     P-R Int : 134 ms          QRS Dur :  78 ms      QT Int : 336 ms       P-R-T Axes :  38  53  27 degrees    QTcB Int : 435 ms    Sinus tachycardia  Otherwise normal ECG  No previous ECGs available  Confirmed by Jean Adair ([id number]) on 4/12/2025 7:58:39 AM    Referred By: AAAREFERRAL SELF           Confirmed By: Jean Adair                                  Imaging Results    None          Medications   sodium chloride 0.9% flush 10 mL (has no administration in time range)   0.9% NaCl infusion ( Intravenous Stopped 4/11/25 2016)   ondansetron disintegrating tablet 8 mg (has no administration in time range)   0.9% NaCl infusion ( Intravenous Stopped 4/12/25 0719)   albuterol inhaler 2 puff (has no administration in time range)   colchicine capsule/tablet 0.6 mg (0.6 mg Oral Given 4/12/25 0856)   pantoprazole EC tablet 40 mg (40 mg Oral Given 4/12/25 0856)   ibuprofen tablet 800 mg (800 mg Oral Given 4/12/25 0523)   sodium chloride 0.9% flush 10 mL (has no administration in time range)   acetaminophen tablet 650 mg (has no administration in time range)   potassium bicarbonate disintegrating tablet 40 mEq (40 mEq Oral Given 4/12/25 0856)   ketorolac injection 9.999 mg (9.999 mg Intravenous Given 4/11/25 1938)     Medical Decision Making  Emergent evaluation of 24-year-old male referred to ED by Cardiology for pericardial effusion.  Had recent pericarditis suspected from flu A.  Vitals stable and well-appearing at this time. Chest x-ray shows massive pericardial effusion.  Labs initiated by triage provider.  Will discuss with cardiology for bedside ultrasound in need for pericardialcentesis    My differential diagnoses include but are not limited to:  Pericardial effusion, pericarditis, cardiac tamponade  See ED course.  I have reviewed the  "patient's records and discussed with my supervising physician.    Amount and/or Complexity of Data Reviewed  Labs:  Decision-making details documented in ED Course.    Risk  Prescription drug management.  Decision regarding hospitalization.               ED Course as of 04/12/25 1243   Fri Apr 11, 2025 1914 CXR today Impression:     Cardiomegaly with findings concerning for pericarditis with pericardial effusion. [KB]   1930 CRP(!): 146.0 [KB]   1932 Troponin I High Sensitivity(!): 55 [KB]   1932 Discussed with cards who will come do beside echo [KB]   1942 BNP: 74 [KB]   1951 Activating cath lab per cardiology. Will place drain and place in ICU [KB]      ED Course User Index  [KB] Cynthia Fernandes PA-C                           Clinical Impression:  Final diagnoses:  [R07.9] Chest pain  [I31.39] Pericardial effusion (Primary)  [I31.9] Pericarditis, unspecified chronicity, unspecified type          ED Disposition Condition    Admit Stable                    [1]   Social History  Tobacco Use    Smoking status: Every Day     Current packs/day: 0.50     Average packs/day: 0.5 packs/day for 5.3 years (2.6 ttl pk-yrs)     Types: Cigarettes, Vaping with nicotine     Start date: 2020     Passive exposure: Never    Smokeless tobacco: Never    Tobacco comments:     Pt states that he started smoking cigarettes and vaping at age 19, but then quit using nicotine in any form 1 month ago when he started feeling ill.  EMR to be updated to reflect "Former Smoker" status when pt remains without relapse as of 2/12//2026.   Substance Use Topics    Alcohol use: No    Drug use: Yes     Types: Marijuana        Cynthia Fernandes PA-C  04/12/25 1252    "

## 2025-04-12 NOTE — HPI
Mr Snyder comes to the ED from outpatient cardiology office with Dr Kelly for massive pericardial effusion.     24-year-old male with PMH for MDD, ADHD and PTSD who initially developed symptoms 4-5 days prior to hospital admission on 03/10/2025, reporting chest pain when lying down and dyspnea. Symptoms persisted and worsened over four days, prompting him to seek medical attention.     In the hospital, he was diagnosed with a pericardial effusion/acute pericarditis and treated with high-dose ibuprofen (800 mg q8h). He was discharged on colchicine 0.6 mg daily and instructed to taper ibuprofen over a week or as pain lessened. He reports that pain never fully subsided, and he continued taking ibuprofen intermittently.  GI symptoms were limiting his ibuprofen use.     5 days ago, he had a significant flare-up of symptoms. Pain was significant enough that he left work on Monday night. He restarted high-dose ibuprofen, taking up to 5 tablets (1000 mg) q8h, 2-3 times daily. He has been out of work since Monday due to pain.     He reports that today has been one of his best days symptom-wise, which he attributes to resting and avoiding physical exertion. He still has discomfort when lying flat on his back, a symptom that has persisted since March. He also notes pain that radiates from his heart to his shoulder, particularly on symptomatic days, as well as pain in the neck area and discomfort when lying on it.     He had influenza A 1 month before his March hospitalization, which kept him sick for a whole week. This is suspected to be the trigger for his pericarditis.     He has a family history of recurrent pericarditis, with his mother also affected by the condition.     He denies any previous diagnoses of pericarditis prior to 03/2025 or a history of other relevant medical conditions or inflammatory diagnoses.

## 2025-04-12 NOTE — PROGRESS NOTES
[facility] - [facility] Intensive Care  Cardiology  Progress Note    Patient Name: Shekhar Snyder  MRN: [id number]  Admission Date: 4/11/2025  Hospital Length of Stay: 1 days  Code Status: Full Code   Attending Physician: Ernesto Wing MD   Primary Care Physician: Fracisco Phipps MD  Expected Discharge Date:   Principal Problem:<principal problem not specified>    Subjective:     Hospital Course:   Admitted for pericarditis and pericardial effusion with tamponade physiology. 1.6L of pericardial fluid were drained by interventional cardiology. Started on ibuprofen and colchicine.     Interval History: s/p pericardiocentesis 4/11 with 1.6L removed, still draining. Pt endorsing continued chest pain and MCCALLUM.    Review of Systems   Constitutional: Negative for chills, diaphoresis, fever and malaise/fatigue.   Cardiovascular:  Positive for chest pain and dyspnea on exertion. Negative for orthopnea and palpitations.   Respiratory:  Positive for shortness of breath. Negative for cough and hemoptysis.    Neurological:  Negative for dizziness, headaches, light-headedness and weakness.     Objective:     Vital Signs (Most Recent):  Temp: 97.7 °F (36.5 °C) (04/12/25 0730)  Pulse: 84 (04/12/25 0940)  Resp: (!) 22 (04/12/25 0940)  BP: 119/77 (04/12/25 0800)  SpO2: 95 % (04/12/25 0940) Vital Signs (24h Range):  Temp:  [97.7 °F (36.5 °C)-98.3 °F (36.8 °C)] 97.7 °F (36.5 °C)  Pulse:  [] 84  Resp:  [0-34] 22  SpO2:  [91 %-98 %] 95 %  BP: (109-134)/(55-91) 119/77     Weight: 80.5 kg (177 lb 7.5 oz)  Body mass index is 22.18 kg/m².     SpO2: 95 %         Intake/Output Summary (Last 24 hours) at 4/12/2025 1107  Last data filed at 4/12/2025 0735  Gross per 24 hour   Intake 984.55 ml   Output 2273 ml   Net -1288.45 ml       Lines/Drains/Airways       Drain  Duration                  Drain/Device  04/11/25 2037 Left chest other (see comments) <1 day              Peripheral Intravenous Line  Duration                  Peripheral IV -  Single Lumen 04/11/25 1841 20 G Right Antecubital <1 day         Peripheral IV - Single Lumen 04/11/25 1957 18 G Left Antecubital <1 day                       Physical Exam  Vitals and nursing note reviewed.   Constitutional:       General: He is not in acute distress.     Appearance: Normal appearance. He is ill-appearing.   HENT:      Head: Normocephalic and atraumatic.   Cardiovascular:      Rate and Rhythm: Normal rate and regular rhythm.   Pulmonary:      Effort: Pulmonary effort is normal. No respiratory distress.      Breath sounds: Normal breath sounds. No wheezing.      Comments: On 2L via NC.  Chest:      Comments: Drain present with serosanguinous drainage.   Skin:     General: Skin is warm and dry.   Neurological:      General: No focal deficit present.      Mental Status: He is oriented to person, place, and time.            Significant Labs: All pertinent lab results from the last 24 hours have been reviewed.    Significant Imaging: Echocardiogram: Transthoracic echo (TTE) complete (Cupid Only):   Results for orders placed or performed during the hospital encounter of 03/10/25   Echo   Result Value Ref Range    BSA 1.83 m2    Johnson's Biplane MOD Ejection Fraction 59 %    A2C EF 50 %    A4C EF 68 %    LVOT stroke volume 81.4 cm3    LVIDd 4.8 3.5 - 6.0 cm    LV Systolic Volume 42 mL    LV Systolic Volume Index 22.5 mL/m2    LVIDs 3.2 2.1 - 4.0 cm    LV ESV A2C 36.26 mL    LV Diastolic Volume 108 mL    LV ESV A4C 35.04 mL    LV Diastolic Volume Index 57.75 mL/m2    LV EDV A2C 88.196606259595024 mL    LV EDV A4C 92.06 mL    Left Ventricular End Systolic Volume by Teichholz Method 41.98 mL    Left Ventricular End Diastolic Volume by Teichholz Method 108.46 mL    IVS 1.0 0.6 - 1.1 cm    LVOT diameter 2.1 cm    LVOT area 3.5 cm2    FS 33.3 28 - 44 %    Left Ventricle Relative Wall Thickness 0.42 cm    PW 1.0 0.6 - 1.1 cm    LV mass 170.2 g    LV Mass Index 91.0 g/m2    MV Peak E Jaden 1.06 m/s    TDI LATERAL  "0.08 m/s    TDI SEPTAL 0.09 m/s    E/E' ratio 12 m/s    MV Peak A Jaden 0.68 m/s    TR Max Jaden 2.5 m/s    E/A ratio 1.56     E wave deceleration time 241 msec    MV "A" wave duration [id number] msec    LV SEPTAL E/E' RATIO 11.8 m/s    LV LATERAL E/E' RATIO 13.3 m/s    PV Peak S Jaden 0.40 m/s    PV Peak D Jaden 0.69 m/s    Pulm vein S/D ratio 0.58     LVOT peak jaden 1.2 m/s    Left Ventricular Outflow Tract Mean Velocity 0.77 cm/s    Left Ventricular Outflow Tract Mean Gradient 2.90 mmHg    RV- cai basal diam 4.1 cm    RV S' 12.10 cm/s    TAPSE 2.41 cm    RV/LV Ratio 0.85 cm    LA Vol (MOD) 37 mL    DENZEL (MOD) 20 mL/m2    RA area length vol 30.58 mL    RA Area 12.6 cm2    RA Vol 31.82 mL    AV mean gradient 4 mmHg    AV peak gradient 8 mmHg    Ao peak jaden 1.4 m/s    Ao VTI 27.1 cm    LVOT peak VTI 23.5 cm    AV valve area 3.0 cm²    AV Velocity Ratio 0.86     AV index (prosthetic) 0.87     ASM by Velocity Ratio 3.0 cm²    Mr max jaden 2.05 m/s    MV peak gradient 4 mmHg    MV stenosis pressure 1/2 time 70.01 ms    MV valve area p 1/2 method 3.14 cm2    MV valve area by continuity eq 3.65 cm2    MV VTI 22.3 cm    Triscuspid Valve Regurgitation Peak Gradient 26 mmHg    PV PEAK VELOCITY 0.99 m/s    PV peak gradient 4 mmHg    Pulmonary Valve Mean Velocity 0.80 m/s    Sinus 3.17 cm    STJ 2.56 cm    Ascending aorta 2.98 cm    IVC diameter 2.40 cm    Mean e' 0.09 m/s    ZLVIDS -0.04     ZLVIDD -0.82     LA area A4C 14.55 cm2    LA area A2C 15.02 cm2    TV resting pulmonary artery pressure 28 mmHg    RV TB RVSP 6 mmHg    Est. RA pres 3 mmHg    Narrative      Left Ventricle: The left ventricle is normal in size. Normal wall   thickness. Normal wall motion. There is normal systolic function with a   visually estimated ejection fraction of 55 - 60%. Quantitated ejection   fraction is 59%. There is normal diastolic function.    Right Ventricle: The right ventricle has mild enlargement. Systolic   function is normal.    Left " Atrium: Normal left atrial size.    Aortic Valve: The aortic valve is a trileaflet valve. There is no   significant regurgitation.    Mitral Valve: There is trace regurgitation.    Tricuspid Valve: There is trace regurgitation.    Pulmonary Artery: The estimated pulmonary artery systolic pressure is   28 mmHg.    IVC/SVC: Normal venous pressure at 3 mmHg.    Pericardium: There is a moderate effusion. No indication of cardiac   tamponade. Evidence includes no chamber collapse.       Assessment and Plan:     Pericarditis  Sent to hospital from outpatient cardiology follow up for initial episode of pericarditis from March. Etiology unknown, thought to be 2/2 to influenza infection from February. Incomplete adherence to ibuprofen and colchicine 2/2 GI side effects. Unclear if current presentation is recurrent pericarditis vs incompletely treated primary episode.    Continue ibuprofen and colchicine  PPI  If no resolution of symptoms, can consider steroid course    Pericardial effusion  Symptomatic with pain and SOB, elevations in CRP. EKG unremarkable. Hemodynamics stable, however, echo showed massive effusion, CVP 15, RV diastolic collapse and early cardiac tamponade, consequently, underwent STAT pericardiocentesis and 1.6 L fluid removed with drain left in place.   F/u studies  Continue colchicine and ibuprofen             VTE Risk Mitigation (From admission, onward)           Ordered     IP VTE HIGH RISK PATIENT  Once         04/11/25 2151     Place sequential compression device  Until discontinued         04/11/25 2151                    Rowena Marquez DO  Cardiology  [facility] - Surgical Intensive Care

## 2025-04-12 NOTE — ASSESSMENT & PLAN NOTE
Symptomatic with pain and SOB, elevations in CRP. EKG unremarkable. Hemodynamics stable, however, echo showed massive effusion, CVP 15, RV diastolic collapse and early cardiac tamponade, consequently, underwent STAT pericardiocentesis and 1.6 L fluid removed with drain left in place.   F/u studies  Continue colchicine and ibuprofen

## 2025-04-12 NOTE — HOSPITAL COURSE
Admitted for pericarditis and pericardial effusion with tamponade physiology. 1.6L of pericardial fluid were drained by interventional cardiology. Started on ibuprofen and colchicine. Drainage improved with eventual removal of drain. Some pleural effusions were noted, however, O2 saturations remained stable. CXR ordered prior to discharge revealed improvement in pericardial effusion and new pleural reaction atelectasis and/or consolidation change in left lower lobe.     Patient was medically cleared for discharge, however, prior to discharge patient became hypoxic, CXR was done which showed pleural effusion/atelectasis and was not discharged. Pulmonology was consulted, deferred thoracentesis, recommended CT chest and initiation of antibiotics for possible aspiration in the setting of recent pericardiocentesis. Rheumatology was also consulted for evaluation of possible autoimmune processes. Pulmonology later recommended bronchoscopy, done 4/17, which showed erythematous airways and significant secretions were which were sent for cultures.     CRP were trended throughout stay with fluctuations prompting considerations for initiation of prednisone, also discussed with outpatient cardiologist. Decision was made to discontinue ibuprofen. Start prednisone and continue colchicine. Bedside ultrasound was done to evaluate pericardial effusion which showed increase in pleural effusion, planned to continue watching. Patient reported improvement in chest pain. Overall medically stable for discharge on prednisone and colchicine. Recommend close follow-up with cardiology.

## 2025-04-12 NOTE — CONSULTS
Consult Note  [facility]  Interventional Cardiology      Shekhar Snyder  YOB: 2000  Medical Record Number:  [id number]  Attending Physician:  Linda Abraham MD   Date of Admission: 4/11/2025       Hospital Day:  0  Current Principal Problem:  Cardiac tamponade    HISTORY:     Mr Snyder comes to the ED from outpatient cardiology office with Dr Kelly for massive pericardial effusion.    24-year-old male initially developed symptoms 4-5 days prior to hospital admission on 03/10/2025, reporting chest pain when lying down and dyspnea. Symptoms persisted and worsened over four days, prompting him to seek medical attention.     In the hospital, he was diagnosed with a pericardial effusion/acute pericarditis and treated with high-dose ibuprofen (800 mg q8h). He was discharged on colchicine 0.6 mg daily and instructed to taper ibuprofen over a week or as pain lessened. He reports that pain never fully subsided, and he continued taking ibuprofen intermittently.  GI symptoms were limiting his ibuprofen use.     5 days ago, he had a significant flare-up of symptoms. Pain was significant enough that he left work on Monday night. He restarted high-dose ibuprofen, taking up to 5 tablets (1000 mg) q8h, 2-3 times daily. He has been out of work since Monday due to pain.     He reports that today has been one of his best days symptom-wise, which he attributes to resting and avoiding physical exertion. He still has discomfort when lying flat on his back, a symptom that has persisted since March. He also notes pain that radiates from his heart to his shoulder, particularly on symptomatic days, as well as pain in the neck area and discomfort when lying on it.     He had influenza A 1 month before his March hospitalization, which kept him sick for a whole week. This is suspected to be the trigger for his pericarditis.     He has a family history of recurrent pericarditis, with his mother also affected by the  condition.     He denies any previous diagnoses of pericarditis prior to 03/2025 or a history of other relevant medical conditions or inflammatory diagnoses.     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 Authorizing Provider   colchicine (COLCRYS) 0.6 mg tablet Take 1 tablet (0.6 mg total) by mouth 2 (two) times daily. 4/11/25 4/11/26 Yes Jonathon Kelly MD   ibuprofen (ADVIL,MOTRIN) 800 MG tablet Take 800 mg by mouth every 6 (six) hours as needed for Pain.   Yes Provider, Historical   albuterol (VENTOLIN HFA) 90 mcg/actuation inhaler Inhale 2 puffs into the lungs every 4 (four) hours as needed for Wheezing or Shortness of Breath. Rescue  Patient not taking: Reported on 4/11/2025    Provider, Historical   OPTICHAMBER CHIQUITA LG MASK Spcr USE WITH INHALER  Patient not taking: Reported on 4/11/2025 2/6/25   Provider, Historical   pantoprazole (PROTONIX) 40 MG tablet Take 1 tablet (40 mg total) by mouth once daily. 4/11/25 4/11/26  Jonathon Kelly MD   colchicine (COLCRYS) 0.6 mg tablet Take 1 tablet (0.6 mg total) by mouth once daily. 3/13/25 4/11/25  Arianne Chan MD         Medications - Current  Scheduled Meds:  Continuous Infusions:  PRN Meds:.  Current Facility-Administered Medications:     sodium chloride 0.9%, 10 mL, Intravenous, PRN      Allergies  Review of patient's allergies indicates:   Allergen Reactions    Omnicef [cefdinir] Edema         Past Medical History  Past Medical History:   Diagnosis Date     "ADHD (attention deficit hyperactivity disorder)     Depression     PTSD (post-traumatic stress disorder)          Past Surgical History  Past Surgical History:   Procedure Laterality Date    ADENOIDECTOMY      CIRCUMCISION      OPEN REDUCTION AND INTERNAL FIXATION (ORIF) OF FRACTURE OF CLAVICLE Left 9/20/2022    Procedure: ORIF, FRACTURE, CLAVICLE;  Surgeon: Satya Reynoso IV, MD;  Location: [facility];  Service: Orthopedics;  Laterality: Left;  Beach chair, padded darling stand (no arm krishnamurthy needed), Large C arm come in from head of bed; acumed clavicle system  dian notified 9/19 @ [facility]    TONSILLECTOMY      TYMPANOSTOMY TUBE PLACEMENT           Social History  Social History     Socioeconomic History    Marital status: Single   Tobacco Use    Smoking status: Every Day     Current packs/day: 0.50     Average packs/day: 0.5 packs/day for 5.3 years (2.6 ttl pk-yrs)     Types: Cigarettes, Vaping with nicotine     Start date: 2020     Passive exposure: Never    Smokeless tobacco: Never    Tobacco comments:     Pt states that he started smoking cigarettes and vaping at age 19, but then quit using nicotine in any form 1 month ago when he started feeling ill.  EMR to be updated to reflect "Former Smoker" status when pt remains without relapse as of 2/12//2026.   Substance and Sexual Activity    Alcohol use: No    Drug use: Yes     Types: Marijuana    Sexual activity: Never   Social History Narrative    Lives at home with Mom and Brother, Going to [employer], will be in 6th grade.  One dog at home.     Social Drivers of Health     Financial Resource Strain: Low Risk  (3/11/2025)    Overall Financial Resource Strain (CARDIA)     Difficulty of Paying Living Expenses: Not hard at all   Food Insecurity: No Food Insecurity (3/11/2025)    Hunger Vital Sign     Worried About Running Out of Food in the Last Year: Never true     Ran Out of Food in the Last Year: Never true   Stress: No Stress Concern Present (3/11/2025)    [place] " "[place] [employer] - Occupational Stress Questionnaire     Feeling of Stress : Not at all   Housing Stability: Low Risk  (3/11/2025)    Housing Stability Vital Sign     Unable to Pay for Housing in the Last Year: No     Homeless in the Last Year: No           PHYSICAL EXAM:     Vital Signs  Vitals  Temp: 98.3 °F (36.8 °C)  Temp Source: Oral  Pulse: 94  Resp: (!) 21  SpO2: 96 %  BP: 125/76  MAP (mmHg): 88          24 Hour VS Range    Temp:  [98.2 °F (36.8 °C)-98.3 °F (36.8 °C)]   Pulse:  []   Resp:  [18-21]   BP: (112-134)/(63-91)   SpO2:  [96 %-98 %]   No intake or output data in the 24 hours ending 04/11/25 1953      Vitals: Pulse: 102. Blood pressure: 112/76.  Cardiovascular: Regular rate and rhythm. Normal S1 and S2. Pericardial friction rub present.  Lungs: Lung fields clear to auscultation bilaterally.  Neck: No jugular venous distention.  Extremities: No lower extremity edema.        DATA:       Recent Labs   Lab 04/11/25  1412 04/11/25  1841   WBC 12.32 12.04   HGB 12.1* 12.2*   HCT 38.2* 37.2*    325        No results for input(s): "PROTIME", "INR" in the last 168 hours.     Recent Labs   Lab 04/11/25  1412 04/11/25  1841    137   K 3.5 3.7    103   CO2 25 25   BUN 14 14   CREATININE 0.8 0.8   ANIONGAP 9 9   CALCIUM 8.5* 8.6*        Recent Labs   Lab 04/11/25  1412 04/11/25  1841   ALBUMIN 3.2* 3.2*   BILITOT 1.1* 1.2*   ALKPHOS 65 64   AST 23 18   ALT 33 28        No results for input(s): "TROPONINI" in the last 168 hours.     BNP (pg/mL)   Date Value   04/11/2025 74   03/10/2025 11       No results for input(s): "LABBLOO" in the last 168 hours.       ASSESSMENT/PLAN:     #Massive pericardial effusion with tamponade physiology  -CRP elevated  -symptomatic with pain and SOB  -hemodynamics are stable, however, echo shows massive effusion, CVP 15, RV diastolic collapse and early cardiac tamponade    - Pericardiocentesis +/- drain   - Anti-platelet Therapy: None  - Access: " Subcostal  - Creatinine: 0.8  - Allergies: No shellfish / Iodine allergy  - Pre-Hydration: NS  - Pre-Op Med: Bendaryl 50mg pO   - All patient's questions were answered.  -The risks, benefits and alternatives of the procedure were explained to the patient.   -The risks of the procedure include but are not limited to: bleeding, infection, heart rhythm abnormalities, allergic reactions, kidney injury and potential need for dialysis, stroke and death.   -The risks of moderate sedation include hypotension, respiratory depression, arrhythmias, bronchospasm, and death.   - Informed consent was obtained and the  patient is agreeable to proceed with the procedure.        Signed:  Clemente Dye  Cardiology Fellow PGY-6  4/11/2025  7:53 PM   [facility], [place]

## 2025-04-12 NOTE — EICU
Intervention Initiated From:  [facility] / EICU    Nilda intervened regarding:  Rounding (Video assessment)    Virtual ICU Quality Rounds    Admit Date: 4/11/2025  Hospital Day: 1    ICU Day: 19h    24H Vital Sign Range:  Temp:  [97.7 °F (36.5 °C)-98.3 °F (36.8 °C)]   Pulse:  []   Resp:  [0-34]   BP: (109-142)/(55-91)   SpO2:  [91 %-98 %]     VICU Surveillance Screening    LDA reconciliation : Yes

## 2025-04-12 NOTE — PLAN OF CARE
SICU PLAN OF CARE    Dx: Cardiac Tamponade     Goals of Care: monitor drainage from ASHISH drain, Monitor VS, manage pain    Vital Signs (last 12 hours):   Temp: 97.5F   BP:111/59  HR:53  SPO2:94%  RR: 21  Neuro: AAOx4 and Follows commands     Cardiac: normal sinus rhythm    Respiratory:  2L Nasal Cannula     Gtts:  ml/hr    Urine Output: Voids Spontaneously   mL/shift    Drains: ASHISH #1, total output 43mL /shift    Diet: Cardiac        Labs/Accuchecks: Daily    Skin:  intact.  Patient turned q2h, bony prominences protected, and mattress inflated/working correctly.     Shift Events:  afebrile, pt sleeping, VS stable, O2 dropped 2 L NC placed for comfort, ASHISH drain emptied, family at bedside.  See flowsheet for further assessment/details.  Family updated on current condition/plan of care, questions answered, and emotional support provided.  MD updated on current condition, vitals, labs, and gtts.   Problem: Adult Inpatient Plan of Care  Goal: Plan of Care Review  Outcome: Progressing  Goal: Patient-Specific Goal (Individualized)  Outcome: Progressing  Goal: Absence of Hospital-Acquired Illness or Injury  Outcome: Progressing  Goal: Optimal Comfort and Wellbeing  Outcome: Progressing  Goal: Readiness for Transition of Care  Outcome: Progressing

## 2025-04-12 NOTE — NURSING
SICU PLAN OF CARE    Dx: Admitted for pericarditis and pericardial effusion with tamponade physiology per Cardiology note.     Plan of Care: monitor pericardial drain output, wean supplemental o2 as tolerated.     Shift Events:  No acute events. Ambulated with SOB upon exertion, able to recover with rest. Prn albuterol given x2 for wheezing. See flowsheet for further assessment/details.      Vital Signs (last 12 hours):   Temp:  [97.7 °F (36.5 °C)-98 °F (36.7 °C)]   Pulse:  [65-93]   Resp:  [20-29]   BP: (118-142)/(73-87)   SpO2:  [94 %-98 %]      Neuro: AAOx4, Follows commands , and Moves all extremities spontaneously     Cardiac: normal sinus rhythm  Pulse Readings from Last 1 Encounters:   04/12/25 85     BP Readings from Last 1 Encounters:   04/12/25 132/81     Respiratory:  2L Nasal Cannula   SpO2 Readings from Last 1 Encounters:   04/12/25 97%     : Voids Spontaneously   I/O this shift:  In: 32.2 [I.V.:32.2]  Out: 625 [Urine:600; Other:25]    GI: Diet: Regular, good appetite.     Drains: ASHISH #1, total output 25mL /shift serous     LABS:   Recent Labs   Lab 04/12/25  0525   WBC 8.17   RBC 4.60   HGB 13.0*   HCT 39.9*         Recent Labs   Lab 04/12/25  0525      K 3.4*   CO2 24      BUN 14   CREATININE 0.7   ALKPHOS 59   ALT 28   AST 20   BILITOT 0.9      Recent Labs   Lab 04/12/25  0525   APTT 26.7     ELECTROLYTES: Electrolytes replaced K 3.4 replaced with 80 meq K total during shift.     Skin: drain site WDL.

## 2025-04-12 NOTE — OP NOTE
Brief Operative Note:    : Ismael Laguna Jr., MD     Referring Physician: Self,Aaareferral     All Operators: Surgeon(s):  Calderon Iniguez MD Bourgeois, Roland J Jr., MD     Preoperative Diagnosis: Cardiac tamponade [I31.4]     Postop Diagnosis: Cardiac tamponade [I31.4]    Treatments/Procedures: Procedure(s) (LRB):  Pericardiocentesis (N/A)    Access:  subxiphoid    Findings:pericardial tamponade   See catheterization report for full details.    Intervention: Pericardiocentesis with drain in place    See catheterization report for full details.           Plan:  - Post cath protocol   - IVF @  100cc for 2hr   - Transfer to ICU  - Follow up on pericardial fluid analysis     Estimated Blood loss: 20 cc    Specimens removed: No    Calderon Iniguez MD  [facility]

## 2025-04-12 NOTE — SUBJECTIVE & OBJECTIVE
Interval History: s/p pericardiocentesis 4/11 with 1.6L removed, still draining. Pt endorsing continued chest pain and MCCALLUM.    Review of Systems   Constitutional: Negative for chills, diaphoresis, fever and malaise/fatigue.   Cardiovascular:  Positive for chest pain and dyspnea on exertion. Negative for orthopnea and palpitations.   Respiratory:  Positive for shortness of breath. Negative for cough and hemoptysis.    Neurological:  Negative for dizziness, headaches, light-headedness and weakness.     Objective:     Vital Signs (Most Recent):  Temp: 97.7 °F (36.5 °C) (04/12/25 0730)  Pulse: 84 (04/12/25 0940)  Resp: (!) 22 (04/12/25 0940)  BP: 119/77 (04/12/25 0800)  SpO2: 95 % (04/12/25 0940) Vital Signs (24h Range):  Temp:  [97.7 °F (36.5 °C)-98.3 °F (36.8 °C)] 97.7 °F (36.5 °C)  Pulse:  [] 84  Resp:  [0-34] 22  SpO2:  [91 %-98 %] 95 %  BP: (109-134)/(55-91) 119/77     Weight: 80.5 kg (177 lb 7.5 oz)  Body mass index is 22.18 kg/m².     SpO2: 95 %         Intake/Output Summary (Last 24 hours) at 4/12/2025 1107  Last data filed at 4/12/2025 0735  Gross per 24 hour   Intake 984.55 ml   Output 2273 ml   Net -1288.45 ml       Lines/Drains/Airways       Drain  Duration                  Drain/Device  04/11/25 2037 Left chest other (see comments) <1 day              Peripheral Intravenous Line  Duration                  Peripheral IV - Single Lumen 04/11/25 1841 20 G Right Antecubital <1 day         Peripheral IV - Single Lumen 04/11/25 1957 18 G Left Antecubital <1 day                       Physical Exam  Vitals and nursing note reviewed.   Constitutional:       General: He is not in acute distress.     Appearance: Normal appearance. He is ill-appearing.   HENT:      Head: Normocephalic and atraumatic.   Cardiovascular:      Rate and Rhythm: Normal rate and regular rhythm.   Pulmonary:      Effort: Pulmonary effort is normal. No respiratory distress.      Breath sounds: Normal breath sounds. No wheezing.       "Comments: On 2L via NC.  Chest:      Comments: Drain present with serosanguinous drainage.   Skin:     General: Skin is warm and dry.   Neurological:      General: No focal deficit present.      Mental Status: He is oriented to person, place, and time.            Significant Labs: All pertinent lab results from the last 24 hours have been reviewed.    Significant Imaging: Echocardiogram: Transthoracic echo (TTE) complete (Cupid Only):   Results for orders placed or performed during the hospital encounter of 03/10/25   Echo   Result Value Ref Range    BSA 1.83 m2    Johnson's Biplane MOD Ejection Fraction 59 %    A2C EF 50 %    A4C EF 68 %    LVOT stroke volume 81.4 cm3    LVIDd 4.8 3.5 - 6.0 cm    LV Systolic Volume 42 mL    LV Systolic Volume Index 22.5 mL/m2    LVIDs 3.2 2.1 - 4.0 cm    LV ESV A2C 36.26 mL    LV Diastolic Volume 108 mL    LV ESV A4C 35.04 mL    LV Diastolic Volume Index 57.75 mL/m2    LV EDV A2C 88.046575922292690 mL    LV EDV A4C 92.06 mL    Left Ventricular End Systolic Volume by Teichholz Method 41.98 mL    Left Ventricular End Diastolic Volume by Teichholz Method 108.46 mL    IVS 1.0 0.6 - 1.1 cm    LVOT diameter 2.1 cm    LVOT area 3.5 cm2    FS 33.3 28 - 44 %    Left Ventricle Relative Wall Thickness 0.42 cm    PW 1.0 0.6 - 1.1 cm    LV mass 170.2 g    LV Mass Index 91.0 g/m2    MV Peak E Jaden 1.06 m/s    TDI LATERAL 0.08 m/s    TDI SEPTAL 0.09 m/s    E/E' ratio 12 m/s    MV Peak A Jaden 0.68 m/s    TR Max Jaden 2.5 m/s    E/A ratio 1.56     E wave deceleration time 241 msec    MV "A" wave duration [id number] msec    LV SEPTAL E/E' RATIO 11.8 m/s    LV LATERAL E/E' RATIO 13.3 m/s    PV Peak S Jaden 0.40 m/s    PV Peak D Jaden 0.69 m/s    Pulm vein S/D ratio 0.58     LVOT peak jaden 1.2 m/s    Left Ventricular Outflow Tract Mean Velocity 0.77 cm/s    Left Ventricular Outflow Tract Mean Gradient 2.90 mmHg    RV- cai basal diam 4.1 cm    RV S' 12.10 cm/s    TAPSE 2.41 cm    RV/LV Ratio 0.85 cm "    LA Vol (MOD) 37 mL    DENZEL (MOD) 20 mL/m2    RA area length vol 30.58 mL    RA Area 12.6 cm2    RA Vol 31.82 mL    AV mean gradient 4 mmHg    AV peak gradient 8 mmHg    Ao peak keisha 1.4 m/s    Ao VTI 27.1 cm    LVOT peak VTI 23.5 cm    AV valve area 3.0 cm²    AV Velocity Ratio 0.86     AV index (prosthetic) 0.87     SAM by Velocity Ratio 3.0 cm²    Mr max keisha 2.05 m/s    MV peak gradient 4 mmHg    MV stenosis pressure 1/2 time 70.01 ms    MV valve area p 1/2 method 3.14 cm2    MV valve area by continuity eq 3.65 cm2    MV VTI 22.3 cm    Triscuspid Valve Regurgitation Peak Gradient 26 mmHg    PV PEAK VELOCITY 0.99 m/s    PV peak gradient 4 mmHg    Pulmonary Valve Mean Velocity 0.80 m/s    Sinus 3.17 cm    STJ 2.56 cm    Ascending aorta 2.98 cm    IVC diameter 2.40 cm    Mean e' 0.09 m/s    ZLVIDS -0.04     ZLVIDD -0.82     LA area A4C 14.55 cm2    LA area A2C 15.02 cm2    TV resting pulmonary artery pressure 28 mmHg    RV TB RVSP 6 mmHg    Est. RA pres 3 mmHg    Narrative      Left Ventricle: The left ventricle is normal in size. Normal wall   thickness. Normal wall motion. There is normal systolic function with a   visually estimated ejection fraction of 55 - 60%. Quantitated ejection   fraction is 59%. There is normal diastolic function.    Right Ventricle: The right ventricle has mild enlargement. Systolic   function is normal.    Left Atrium: Normal left atrial size.    Aortic Valve: The aortic valve is a trileaflet valve. There is no   significant regurgitation.    Mitral Valve: There is trace regurgitation.    Tricuspid Valve: There is trace regurgitation.    Pulmonary Artery: The estimated pulmonary artery systolic pressure is   28 mmHg.    IVC/SVC: Normal venous pressure at 3 mmHg.    Pericardium: There is a moderate effusion. No indication of cardiac   tamponade. Evidence includes no chamber collapse.

## 2025-04-12 NOTE — ED NOTES
"Pt had flu in Feb and started having "heart problems." Pt diagnosed with pericarditis and pericardial effusions. Went to cardiologist today and got CXR and they recommended pt come to ED for eval. Endorses chest pressure, SOB, pleuritic pain x2 days. Denies abd pain. Pt states he was vomiting two days ago but stopped yesterday. Denies fevers.   "

## 2025-04-12 NOTE — ASSESSMENT & PLAN NOTE
Sent to hospital from outpatient cardiology follow up for initial episode of pericarditis from March. Etiology unknown, thought to be 2/2 to influenza infection from February. Incomplete adherence to ibuprofen and colchicine 2/2 GI side effects. Unclear if current presentation is recurrent pericarditis vs incompletely treated primary episode.    Continue ibuprofen and colchicine  PPI  If no resolution of symptoms, can consider steroid course

## 2025-04-13 LAB
ABSOLUTE EOSINOPHIL (OHS): 0.79 K/UL
ABSOLUTE MONOCYTE (OHS): 0.9 K/UL (ref 0.3–1)
ABSOLUTE NEUTROPHIL COUNT (OHS): 3.6 K/UL (ref 1.8–7.7)
ALBUMIN SERPL BCP-MCNC: 2.7 G/DL (ref 3.5–5.2)
ALP SERPL-CCNC: 58 UNIT/L (ref 40–150)
ALT SERPL W/O P-5'-P-CCNC: 27 UNIT/L (ref 10–44)
ANION GAP (OHS): 8 MMOL/L (ref 8–16)
APTT PPP: 26.2 SECONDS (ref 21–32)
AST SERPL-CCNC: 17 UNIT/L (ref 11–45)
BASOPHILS # BLD AUTO: 0.05 K/UL
BASOPHILS NFR BLD AUTO: 0.7 %
BILIRUB SERPL-MCNC: 0.3 MG/DL (ref 0.1–1)
BUN SERPL-MCNC: 12 MG/DL (ref 6–20)
CALCIUM SERPL-MCNC: 8.4 MG/DL (ref 8.7–10.5)
CHLORIDE SERPL-SCNC: 110 MMOL/L (ref 95–110)
CO2 SERPL-SCNC: 22 MMOL/L (ref 23–29)
CREAT SERPL-MCNC: 0.6 MG/DL (ref 0.5–1.4)
CRP SERPL-MCNC: 69.54 MG/L
ERYTHROCYTE [DISTWIDTH] IN BLOOD BY AUTOMATED COUNT: 13.1 % (ref 11.5–14.5)
GFR SERPLBLD CREATININE-BSD FMLA CKD-EPI: >60 ML/MIN/1.73/M2
GLUCOSE SERPL-MCNC: 96 MG/DL (ref 70–110)
HCT VFR BLD AUTO: 37.3 % (ref 40–54)
HGB BLD-MCNC: 12.1 GM/DL (ref 14–18)
IMM GRANULOCYTES # BLD AUTO: 0.02 K/UL (ref 0–0.04)
IMM GRANULOCYTES NFR BLD AUTO: 0.3 % (ref 0–0.5)
LYMPHOCYTES # BLD AUTO: 2.12 K/UL (ref 1–4.8)
MAGNESIUM SERPL-MCNC: 2 MG/DL (ref 1.6–2.6)
MCH RBC QN AUTO: 28.2 PG (ref 27–31)
MCHC RBC AUTO-ENTMCNC: 32.4 G/DL (ref 32–36)
MCV RBC AUTO: 87 FL (ref 82–98)
NUCLEATED RBC (/100WBC) (OHS): 0 /100 WBC
OHS QRS DURATION: 98 MS
OHS QTC CALCULATION: 460 MS
PLATELET # BLD AUTO: 319 K/UL (ref 150–450)
PMV BLD AUTO: 10.9 FL (ref 9.2–12.9)
POCT GLUCOSE: 98 MG/DL (ref 70–110)
POTASSIUM SERPL-SCNC: 3.8 MMOL/L (ref 3.5–5.1)
PROT SERPL-MCNC: 6 GM/DL (ref 6–8.4)
RBC # BLD AUTO: 4.29 M/UL (ref 4.6–6.2)
RELATIVE EOSINOPHIL (OHS): 10.6 %
RELATIVE LYMPHOCYTE (OHS): 28.3 % (ref 18–48)
RELATIVE MONOCYTE (OHS): 12 % (ref 4–15)
RELATIVE NEUTROPHIL (OHS): 48.1 % (ref 38–73)
SODIUM SERPL-SCNC: 140 MMOL/L (ref 136–145)
WBC # BLD AUTO: 7.48 K/UL (ref 3.9–12.7)

## 2025-04-13 PROCEDURE — 20000000 HC ICU ROOM

## 2025-04-13 PROCEDURE — 85730 THROMBOPLASTIN TIME PARTIAL: CPT | Performed by: STUDENT IN AN ORGANIZED HEALTH CARE EDUCATION/TRAINING PROGRAM

## 2025-04-13 PROCEDURE — 25000003 PHARM REV CODE 250: Performed by: STUDENT IN AN ORGANIZED HEALTH CARE EDUCATION/TRAINING PROGRAM

## 2025-04-13 PROCEDURE — 99900035 HC TECH TIME PER 15 MIN (STAT)

## 2025-04-13 PROCEDURE — 94761 N-INVAS EAR/PLS OXIMETRY MLT: CPT

## 2025-04-13 PROCEDURE — 85025 COMPLETE CBC W/AUTO DIFF WBC: CPT | Performed by: STUDENT IN AN ORGANIZED HEALTH CARE EDUCATION/TRAINING PROGRAM

## 2025-04-13 PROCEDURE — 94640 AIRWAY INHALATION TREATMENT: CPT

## 2025-04-13 PROCEDURE — 27000221 HC OXYGEN, UP TO 24 HOURS

## 2025-04-13 PROCEDURE — 25000003 PHARM REV CODE 250

## 2025-04-13 PROCEDURE — 99291 CRITICAL CARE FIRST HOUR: CPT | Mod: ,,, | Performed by: INTERNAL MEDICINE

## 2025-04-13 PROCEDURE — 83735 ASSAY OF MAGNESIUM: CPT | Performed by: STUDENT IN AN ORGANIZED HEALTH CARE EDUCATION/TRAINING PROGRAM

## 2025-04-13 PROCEDURE — 86141 C-REACTIVE PROTEIN HS: CPT | Performed by: STUDENT IN AN ORGANIZED HEALTH CARE EDUCATION/TRAINING PROGRAM

## 2025-04-13 PROCEDURE — 82040 ASSAY OF SERUM ALBUMIN: CPT | Performed by: STUDENT IN AN ORGANIZED HEALTH CARE EDUCATION/TRAINING PROGRAM

## 2025-04-13 RX ORDER — ALBUTEROL SULFATE 2.5 MG/.5ML
2.5 SOLUTION RESPIRATORY (INHALATION) EVERY 8 HOURS
Status: DISCONTINUED | OUTPATIENT
Start: 2025-04-14 | End: 2025-04-18

## 2025-04-13 RX ORDER — POTASSIUM CHLORIDE 750 MG/1
30 CAPSULE, EXTENDED RELEASE ORAL ONCE
Status: COMPLETED | OUTPATIENT
Start: 2025-04-13 | End: 2025-04-13

## 2025-04-13 RX ORDER — OXYCODONE HYDROCHLORIDE 5 MG/1
5 TABLET ORAL ONCE
Refills: 0 | Status: COMPLETED | OUTPATIENT
Start: 2025-04-13 | End: 2025-04-13

## 2025-04-13 RX ADMIN — ALBUTEROL SULFATE 2 PUFF: 108 AEROSOL, METERED RESPIRATORY (INHALATION) at 03:04

## 2025-04-13 RX ADMIN — IBUPROFEN 800 MG: 400 TABLET ORAL at 10:04

## 2025-04-13 RX ADMIN — ALBUTEROL SULFATE 2.5 MG: 2.5 SOLUTION RESPIRATORY (INHALATION) at 11:04

## 2025-04-13 RX ADMIN — COLCHICINE 0.6 MG: 0.6 TABLET, FILM COATED ORAL at 08:04

## 2025-04-13 RX ADMIN — OXYCODONE 5 MG: 5 TABLET ORAL at 09:04

## 2025-04-13 RX ADMIN — ALBUTEROL SULFATE 2 PUFF: 108 AEROSOL, METERED RESPIRATORY (INHALATION) at 04:04

## 2025-04-13 RX ADMIN — COLCHICINE 0.6 MG: 0.6 TABLET, FILM COATED ORAL at 09:04

## 2025-04-13 RX ADMIN — IBUPROFEN 800 MG: 400 TABLET ORAL at 01:04

## 2025-04-13 RX ADMIN — PANTOPRAZOLE SODIUM 40 MG: 40 TABLET, DELAYED RELEASE ORAL at 09:04

## 2025-04-13 RX ADMIN — ACETAMINOPHEN 650 MG: 325 TABLET ORAL at 03:04

## 2025-04-13 RX ADMIN — IBUPROFEN 800 MG: 400 TABLET ORAL at 05:04

## 2025-04-13 RX ADMIN — POTASSIUM CHLORIDE 30 MEQ: 750 CAPSULE, EXTENDED RELEASE ORAL at 01:04

## 2025-04-13 NOTE — EICU
Virtual ICU Quality Rounds    Admit Date: 4/11/2025  Hospital Day: 2    ICU Day: 1d 15h    24H Vital Sign Range:  Temp:  [97.6 °F (36.4 °C)-98 °F (36.7 °C)]   Pulse:  [60-93]   Resp:  [10-29]   BP: (107-142)/(60-95)   SpO2:  [92 %-98 %]     VICU Surveillance Screening                    LDA reconciliation : Yes

## 2025-04-13 NOTE — PROGRESS NOTES
[facility]  Cardiology  Progress Note    Patient Name: Shekhar Snyder  MRN: [id number]  Admission Date: 4/11/2025  Hospital Length of Stay: 2 days  Code Status: Full Code   Attending Physician: Ernesto Wing MD   Primary Care Physician: Fracisco Phipps MD  Expected Discharge Date:   Principal Problem:<principal problem not specified>    Subjective:     Hospital Course:   Admitted for pericarditis and pericardial effusion with tamponade physiology. 1.6L of pericardial fluid were drained by interventional cardiology. Started on ibuprofen and colchicine. Drainage improved.     Interval History: Some difficulty breathing with improvement with albuterol. Continued chest pain. HDS, inflammatory markers improving. No GI side effects.     ROS  Objective:     Vital Signs (Most Recent):  Temp: 97.8 °F (36.6 °C) (04/13/25 0745)  Pulse: 73 (04/13/25 0900)  Resp: (!) 22 (04/13/25 0900)  BP: 125/83 (04/13/25 0900)  SpO2: (!) 93 % (04/13/25 0900) Vital Signs (24h Range):  Temp:  [97.6 °F (36.4 °C)-98 °F (36.7 °C)] 97.8 °F (36.6 °C)  Pulse:  [60-93] 73  Resp:  [10-29] 22  SpO2:  [92 %-98 %] 93 %  BP: (107-142)/(60-95) 125/83     Weight: 80.5 kg (177 lb 7.5 oz)  Body mass index is 22.18 kg/m².     SpO2: (!) 93 %         Intake/Output Summary (Last 24 hours) at 4/13/2025 1125  Last data filed at 4/13/2025 0705  Gross per 24 hour   Intake 200 ml   Output 1078 ml   Net -878 ml       Lines/Drains/Airways       Drain  Duration                  Drain/Device  04/11/25 2037 Left chest other (see comments) 1 day              Peripheral Intravenous Line  Duration                  Peripheral IV - Single Lumen 04/11/25 1841 20 G Right Antecubital 1 day         Peripheral IV - Single Lumen 04/11/25 1957 18 G Left Antecubital 1 day                       Physical Exam  Vitals and nursing note reviewed.   Constitutional:       General: He is not in acute distress.     Appearance: Normal appearance. He is not  ill-appearing.   HENT:      Head: Normocephalic and atraumatic.   Cardiovascular:      Rate and Rhythm: Normal rate and regular rhythm.   Pulmonary:      Effort: Pulmonary effort is normal. No respiratory distress.      Breath sounds: Normal breath sounds. No wheezing.      Comments: On 2L via NC.  Chest:      Comments: Drain present with serosanguinous drainage.   Skin:     General: Skin is warm and dry.   Neurological:      General: No focal deficit present.      Mental Status: He is oriented to person, place, and time.            Significant Labs: All pertinent lab results from the last 24 hours have been reviewed.    Assessment and Plan:     Pericarditis  Sent to hospital from outpatient cardiology follow up for initial episode of pericarditis from March. Etiology unknown, thought to be 2/2 to influenza infection from February. Incomplete adherence to ibuprofen and colchicine 2/2 GI side effects. Unclear if current presentation is recurrent pericarditis vs incompletely treated primary episode.    Continue ibuprofen and colchicine  PPI  If no resolution of symptoms, can consider steroid course    Pericardial effusion  Symptomatic with pain and SOB, elevations in CRP. EKG unremarkable. Hemodynamics stable, however, echo showed massive effusion, CVP 15, RV diastolic collapse and early cardiac tamponade, consequently, underwent STAT pericardiocentesis and 1.6 L fluid removed with drain left in place.   F/u studies  Continue colchicine and ibuprofen             VTE Risk Mitigation (From admission, onward)           Ordered     IP VTE HIGH RISK PATIENT  Once         04/11/25 2151     Place sequential compression device  Until discontinued         04/11/25 2151                    Rowena Marquez DO  Cardiology  [facility]

## 2025-04-13 NOTE — PLAN OF CARE
[facility]  Initial Discharge Assessment       Primary Care Provider: Fracisco Phipps MD    Admission Diagnosis: Pericardial effusion [I31.39]  Cardiac tamponade [I31.4]  Chest pain [R07.9]  Pericarditis, unspecified chronicity, unspecified type [I31.9]    Admission Date: 4/11/2025  Expected Discharge Date:     Transition of Care Barriers: None    Payor: BLUE CROSS BLUE SHIELD / Plan: BCBS OF [facility] BLUE / Product Type: Commercial /     Extended Emergency Contact Information  Primary Emergency Contact: LillianNickie  Address: [address]           [place] [address] [facility]  Mobile Phone: [phone]  Relation: Mother  Preferred language: English  Secondary Emergency Contact: Robert Yang  Mobile Phone: [phone]  Relation: Father    Discharge Plan A: Home with family  Discharge Plan B: Home      [id number] - [place]  [address] AT [facility] [address]  [address] [place] [address]  [place] [address]  Phone: [phone] Fax: [phone]    [email or web address] #[id number] - [place] - [address] AT [facility] & [facility]  [address]  [place] [address]  Phone: [phone] Fax: [phone]    [email or web address] #[id number] - [place] - [address]  AT [facility] [address]  [address] [place] [address]  Phone: [phone] Fax: [phone]    [email or web address] #[id number] - [place] - [address] AT [facility] & [facility]  [address] [place] [address]  Phone: [phone] Fax: [phone]    [id number] - [place] - [address] AT [facility]  [address]  [place] [address]  Phone: [phone] Fax: [phone]    [facility][id number] - [place] - [address] AT [address] [place]  [address]  [place] [address]  Phone: [phone] Fax: [phone] initial  Assessment (most recent)       Adult Discharge Assessment - 04/13/25 1223          Discharge Assessment    Assessment Type Discharge Planning Assessment     Confirmed/corrected address, phone number and insurance Yes     Confirmed Demographics Correct on Facesheet     Source of Information patient     Communicated BECKY with patient/caregiver Yes     Reason For Admission Pericardial effusion     People in Home significant other     Do you expect to return to your current living situation? Yes     Do you have help at home or someone to help you manage your care at home? No     Prior to hospitilization cognitive status: Alert/Oriented     Current cognitive status: Alert/Oriented     Walking or Climbing Stairs Difficulty no     Dressing/Bathing Difficulty no     Home Layout Able to live on 1st floor     Equipment Currently Used at Home none     Readmission within 30 days? Yes     Patient currently being followed by outpatient case management? No     Do you currently have service(s) that help you manage your care at home? No     Do you take prescription medications? Yes     Do you have prescription coverage? Yes     Coverage Payor: [place] CROSS BLUE [facility] -     Do you have any problems affording any of your prescribed medications? No     Is the patient taking medications as prescribed? yes     Who is going to help you get home at discharge? Nickie Snyder     How do you get to doctors appointments? car, drives self     Are you on dialysis? No     Do you take coumadin? No     Discharge Plan A Home with family     Discharge Plan B Home     DME Needed Upon Discharge  none     Discharge Plan discussed with: Patient     Transition of Care Barriers None        Physical Activity    On average, how many days per week do you engage in moderate to strenuous exercise (like a brisk walk)? 5 days   job has a lot cardio expecatations    On average, how many minutes do you engage in exercise at this level? 50 min         Financial Resource Strain    How hard is it for you to pay for the very basics like food, housing, medical care, and heating? Not hard at all        Housing Stability    In the last 12 months, was there a time when you were not able to pay the mortgage or rent on time? No     At any time in the past 12 months, were you homeless or living in a shelter (including now)? No        Transportation Needs    In the past 12 months, has lack of transportation kept you from medical appointments or from getting medications? No     In the past 12 months, has lack of transportation kept you from meetings, work, or from getting things needed for daily living? No        Food Insecurity    Within the past 12 months, you worried that your food would run out before you got the money to buy more. Never true     Within the past 12 months, the food you bought just didn't last and you didn't have money to get more. Never true        Stress    Do you feel stress - tense, restless, nervous, or anxious, or unable to sleep at night because your mind is troubled all the time - these days? Not at all        Social Isolation    How often do you feel lonely or isolated from those around you?  Never        Alcohol Use    Q1: How often do you have a drink containing alcohol? Never     Q2: How many drinks containing alcohol do you have on a typical day when you are drinking? Patient does not drink     Q3: How often do you have six or more drinks on one occasion? Never        Utilities    In the past 12 months has the electric, gas, oil, or water company threatened to shut off services in your home? No        Health Literacy    How often do you need to have someone help you when you read instructions, pamphlets, or other written material from your doctor or pharmacy? Never                     Readmission Assessment (most recent)       Readmission Assessment - 04/13/25 1222          Readmission    Was this a planned readmission? No     Why were  you hospitalized in the last 30 days? Pericarditis     Why were you readmitted? Alarmed about signs/symptoms     When you left the hospital where did you go? Home with Family     Did patient/caregiver refused recommended DC plan? No     Did you try to manage your symptoms your self? No     Did you call anyone? Yes     Who did you call? Emergency Room     Did you try to see or did see a doctor or nurse before you came? No     Did you have  a follow-up appointment on discharge? No                  The SW met with the patient and pt mother Rik Snyder  at bedside. The SW placed name and contact information on the blackboard in the patient's room. Use preferred pharmacy / bedside delivery for any necessary medications at the time of discharge. The patient is independent with all ADLs. The patient is not on Dialysis or Coumadin. The Patient does not use DME or home oxygen, The patient's mother  will provide assistance to the patient upon discharge. The patient's mother will provide transportation upon discharge. SW will continue to follow for course of hospitalization.  A discharge planning booklet was left at bedside.        Discharge Plan A and Plan B have been determined by review of patient's clinical status, future medical and therapeutic needs, and coverage/benefits for post-acute care in coordination with multidisciplinary team members.    Adriana Velazquez MSW, KIRSTENW  [facility]  Case Management Dept.

## 2025-04-13 NOTE — EICU
Intervention Initiated From:  [facility] / EICU    Nilda intervened regarding:  Rounding (Video assessment)    Nurse Notified:  No    Doctor Notified:  No    VICU Night Rounds Checklist  24H Vital Sign Range:  Temp:  [97.7 °F (36.5 °C)-98.3 °F (36.8 °C)]   Pulse:  []   Resp:  [0-29]   BP: (109-142)/(55-95)   SpO2:  [91 %-98 %]     Video rounds and LDA reconciliation    Nursing orders placed : IP RAMOS Peripheral IV Access

## 2025-04-13 NOTE — NURSING
SICU PLAN OF CARE    Dx: Admitted for pericarditis and pericardial effusion with tamponade physiology per Cardiology note     Plan of Care: monitor pericardial drain output, wean supplemental o2 as tolerated.     Shift Events:  No acute events. Drain output  14ml total serous fluid. Albuterol PRN x1 for wheezing. Albuterol neb tx now scheduled. Able to wean o2 to RA while at rest, 2 L NC while ambulating. 1 BM. See flowsheet for further assessment/details.      Vital Signs (last 12 hours):   Temp:  [97.8 °F (36.6 °C)-98.2 °F (36.8 °C)]   Pulse:  [63-99]   Resp:  [12-29]   BP: (108-142)/(63-98)   SpO2:  [92 %-99 %]      Neuro: AAOx4, Follows commands , and Moves all extremities spontaneously     Cardiac: normal sinus rhythm  Pulse Readings from Last 1 Encounters:   04/13/25 95     BP Readings from Last 1 Encounters:   04/13/25 125/76     Respiratory: Room Air, 2L Nasal Cannula   SpO2 Readings from Last 1 Encounters:   04/13/25 97%     : Voids Spontaneously   I/O this shift:  In: -   Out: 614 [Urine:600; drain:14]    GI: Diet: Regular and Cardiac , good appetite     Drains: ASHISH #1, total output 14mL /shift    LABS:   Recent Labs   Lab 04/13/25  0525   WBC 7.48   RBC 4.29*   HGB 12.1*   HCT 37.3*         Recent Labs   Lab 04/13/25  0525      K 3.8   CO2 22*      BUN 12   CREATININE 0.6   ALKPHOS 58   ALT 27   AST 17   BILITOT 0.3      Recent Labs   Lab 04/13/25  0525   APTT 26.2     ELECTROLYTES: Electrolytes replaced K CR capsule 30meq total given for K 3.8.     Skin:  drain insertion site WDL.

## 2025-04-13 NOTE — SUBJECTIVE & OBJECTIVE
Interval History: Some difficulty breathing with improvement with albuterol. Continued chest pain. HDS, inflammatory markers improving. No GI side effects.     ROS  Objective:     Vital Signs (Most Recent):  Temp: 97.8 °F (36.6 °C) (04/13/25 0745)  Pulse: 73 (04/13/25 0900)  Resp: (!) 22 (04/13/25 0900)  BP: 125/83 (04/13/25 0900)  SpO2: (!) 93 % (04/13/25 0900) Vital Signs (24h Range):  Temp:  [97.6 °F (36.4 °C)-98 °F (36.7 °C)] 97.8 °F (36.6 °C)  Pulse:  [60-93] 73  Resp:  [10-29] 22  SpO2:  [92 %-98 %] 93 %  BP: (107-142)/(60-95) 125/83     Weight: 80.5 kg (177 lb 7.5 oz)  Body mass index is 22.18 kg/m².     SpO2: (!) 93 %         Intake/Output Summary (Last 24 hours) at 4/13/2025 1125  Last data filed at 4/13/2025 0705  Gross per 24 hour   Intake 200 ml   Output 1078 ml   Net -878 ml       Lines/Drains/Airways       Drain  Duration                  Drain/Device  04/11/25 2037 Left chest other (see comments) 1 day              Peripheral Intravenous Line  Duration                  Peripheral IV - Single Lumen 04/11/25 1841 20 G Right Antecubital 1 day         Peripheral IV - Single Lumen 04/11/25 1957 18 G Left Antecubital 1 day                       Physical Exam  Vitals and nursing note reviewed.   Constitutional:       General: He is not in acute distress.     Appearance: Normal appearance. He is not ill-appearing.   HENT:      Head: Normocephalic and atraumatic.   Cardiovascular:      Rate and Rhythm: Normal rate and regular rhythm.   Pulmonary:      Effort: Pulmonary effort is normal. No respiratory distress.      Breath sounds: Normal breath sounds. No wheezing.      Comments: On 2L via NC.  Chest:      Comments: Drain present with serosanguinous drainage.   Skin:     General: Skin is warm and dry.   Neurological:      General: No focal deficit present.      Mental Status: He is oriented to person, place, and time.            Significant Labs: All pertinent lab results from the last 24 hours have been  reviewed.

## 2025-04-13 NOTE — NURSING
SICU PLAN OF CARE     Dx: Admitted for pericarditis and pericardial effusion with tamponade physiology per Cardiology note.      Plan of Care: monitor pericardial drain output, wean supplemental o2 as tolerated.      Shift Events:  No acute events. Continues to have SOB upon exertion. PRN albuterol given x1 for wheezing, PRN tylenol x 1 administered for incisional pain. See flowsheet for further assessment/details.       Vital Signs (last 12 hours):   Temp:  [97.6 °F-98 °F]   Pulse:  [60-74]   Resp:  [10-25]   BP: (107-140)/(60-95)   SpO2:  [94 %-98 %]       Neuro: AAOx4, Follows commands , and Moves all extremities spontaneously      Cardiac: normal sinus rhythm    Resp: 3 L nasal cannula w/ humidification       : Voids Spontaneously     Output : 1050/shift     GI: Diet: Regular     Drains: ASHISH #1 total output 10mL /shift serous fluid

## 2025-04-14 ENCOUNTER — TELEPHONE (OUTPATIENT)
Dept: ADMINISTRATIVE | Facility: CLINIC | Age: 25
End: 2025-04-14
Payer: COMMERCIAL

## 2025-04-14 ENCOUNTER — TELEPHONE (OUTPATIENT)
Dept: CARDIOLOGY | Facility: CLINIC | Age: 25
End: 2025-04-14
Payer: COMMERCIAL

## 2025-04-14 LAB
ABSOLUTE EOSINOPHIL (OHS): 0.74 K/UL
ABSOLUTE MONOCYTE (OHS): 0.94 K/UL (ref 0.3–1)
ABSOLUTE NEUTROPHIL COUNT (OHS): 5.15 K/UL (ref 1.8–7.7)
ACID FAST MOD KINY STN SPEC: NORMAL
ALBUMIN SERPL BCP-MCNC: 2.9 G/DL (ref 3.5–5.2)
ALP SERPL-CCNC: 62 UNIT/L (ref 40–150)
ALT SERPL W/O P-5'-P-CCNC: 24 UNIT/L (ref 10–44)
ANION GAP (OHS): 11 MMOL/L (ref 8–16)
APTT PPP: 25.3 SECONDS (ref 21–32)
AST SERPL-CCNC: 21 UNIT/L (ref 11–45)
BASOPHILS # BLD AUTO: 0.05 K/UL
BASOPHILS NFR BLD AUTO: 0.5 %
BILIRUB SERPL-MCNC: 0.2 MG/DL (ref 0.1–1)
BUN SERPL-MCNC: 13 MG/DL (ref 6–20)
CALCIUM SERPL-MCNC: 8.7 MG/DL (ref 8.7–10.5)
CHLORIDE SERPL-SCNC: 111 MMOL/L (ref 95–110)
CO2 SERPL-SCNC: 20 MMOL/L (ref 23–29)
CREAT SERPL-MCNC: 0.7 MG/DL (ref 0.5–1.4)
CRP SERPL-MCNC: 47.41 MG/L
ERYTHROCYTE [DISTWIDTH] IN BLOOD BY AUTOMATED COUNT: 13.1 % (ref 11.5–14.5)
GFR SERPLBLD CREATININE-BSD FMLA CKD-EPI: >60 ML/MIN/1.73/M2
GLUCOSE SERPL-MCNC: 103 MG/DL (ref 70–110)
HCT VFR BLD AUTO: 39.5 % (ref 40–54)
HGB BLD-MCNC: 12.7 GM/DL (ref 14–18)
IMM GRANULOCYTES # BLD AUTO: 0.03 K/UL (ref 0–0.04)
IMM GRANULOCYTES NFR BLD AUTO: 0.3 % (ref 0–0.5)
LYMPHOCYTES # BLD AUTO: 3.26 K/UL (ref 1–4.8)
MAGNESIUM SERPL-MCNC: 2.1 MG/DL (ref 1.6–2.6)
MCH RBC QN AUTO: 28.2 PG (ref 27–31)
MCHC RBC AUTO-ENTMCNC: 32.2 G/DL (ref 32–36)
MCV RBC AUTO: 88 FL (ref 82–98)
NUCLEATED RBC (/100WBC) (OHS): 0 /100 WBC
PLATELET # BLD AUTO: 392 K/UL (ref 150–450)
PMV BLD AUTO: 10.9 FL (ref 9.2–12.9)
POCT GLUCOSE: 106 MG/DL (ref 70–110)
POTASSIUM SERPL-SCNC: 4.1 MMOL/L (ref 3.5–5.1)
PROT SERPL-MCNC: 6.8 GM/DL (ref 6–8.4)
RBC # BLD AUTO: 4.5 M/UL (ref 4.6–6.2)
RELATIVE EOSINOPHIL (OHS): 7.3 %
RELATIVE LYMPHOCYTE (OHS): 32.1 % (ref 18–48)
RELATIVE MONOCYTE (OHS): 9.2 % (ref 4–15)
RELATIVE NEUTROPHIL (OHS): 50.6 % (ref 38–73)
SODIUM SERPL-SCNC: 142 MMOL/L (ref 136–145)
WBC # BLD AUTO: 10.17 K/UL (ref 3.9–12.7)

## 2025-04-14 PROCEDURE — 83735 ASSAY OF MAGNESIUM: CPT | Performed by: STUDENT IN AN ORGANIZED HEALTH CARE EDUCATION/TRAINING PROGRAM

## 2025-04-14 PROCEDURE — 85025 COMPLETE CBC W/AUTO DIFF WBC: CPT | Performed by: STUDENT IN AN ORGANIZED HEALTH CARE EDUCATION/TRAINING PROGRAM

## 2025-04-14 PROCEDURE — 83615 LACTATE (LD) (LDH) ENZYME: CPT | Performed by: HOSPITALIST

## 2025-04-14 PROCEDURE — 99233 SBSQ HOSP IP/OBS HIGH 50: CPT | Mod: ,,, | Performed by: INTERNAL MEDICINE

## 2025-04-14 PROCEDURE — 25000242 PHARM REV CODE 250 ALT 637 W/ HCPCS: Performed by: STUDENT IN AN ORGANIZED HEALTH CARE EDUCATION/TRAINING PROGRAM

## 2025-04-14 PROCEDURE — 25000003 PHARM REV CODE 250: Performed by: STUDENT IN AN ORGANIZED HEALTH CARE EDUCATION/TRAINING PROGRAM

## 2025-04-14 PROCEDURE — 27000221 HC OXYGEN, UP TO 24 HOURS

## 2025-04-14 PROCEDURE — 94640 AIRWAY INHALATION TREATMENT: CPT

## 2025-04-14 PROCEDURE — 80053 COMPREHEN METABOLIC PANEL: CPT | Performed by: STUDENT IN AN ORGANIZED HEALTH CARE EDUCATION/TRAINING PROGRAM

## 2025-04-14 PROCEDURE — 86141 C-REACTIVE PROTEIN HS: CPT | Performed by: STUDENT IN AN ORGANIZED HEALTH CARE EDUCATION/TRAINING PROGRAM

## 2025-04-14 PROCEDURE — 99900035 HC TECH TIME PER 15 MIN (STAT)

## 2025-04-14 PROCEDURE — 20600001 HC STEP DOWN PRIVATE ROOM

## 2025-04-14 PROCEDURE — 85730 THROMBOPLASTIN TIME PARTIAL: CPT | Performed by: STUDENT IN AN ORGANIZED HEALTH CARE EDUCATION/TRAINING PROGRAM

## 2025-04-14 PROCEDURE — 94761 N-INVAS EAR/PLS OXIMETRY MLT: CPT

## 2025-04-14 RX ORDER — ALBUTEROL SULFATE 90 UG/1
2 INHALANT RESPIRATORY (INHALATION) EVERY 6 HOURS PRN
COMMUNITY

## 2025-04-14 RX ORDER — IBUPROFEN 200 MG
TABLET ORAL
Qty: 294 TABLET | Refills: 0 | Status: SHIPPED | OUTPATIENT
Start: 2025-04-14 | End: 2025-04-21 | Stop reason: HOSPADM

## 2025-04-14 RX ADMIN — COLCHICINE 0.6 MG: 0.6 TABLET, FILM COATED ORAL at 09:04

## 2025-04-14 RX ADMIN — PANTOPRAZOLE SODIUM 40 MG: 40 TABLET, DELAYED RELEASE ORAL at 08:04

## 2025-04-14 RX ADMIN — COLCHICINE 0.6 MG: 0.6 TABLET, FILM COATED ORAL at 08:04

## 2025-04-14 RX ADMIN — IBUPROFEN 800 MG: 400 TABLET ORAL at 01:04

## 2025-04-14 RX ADMIN — ALBUTEROL SULFATE 2.5 MG: 2.5 SOLUTION RESPIRATORY (INHALATION) at 04:04

## 2025-04-14 RX ADMIN — IBUPROFEN 800 MG: 400 TABLET ORAL at 09:04

## 2025-04-14 RX ADMIN — ALBUTEROL SULFATE 2.5 MG: 2.5 SOLUTION RESPIRATORY (INHALATION) at 11:04

## 2025-04-14 RX ADMIN — ALBUTEROL SULFATE 2.5 MG: 2.5 SOLUTION RESPIRATORY (INHALATION) at 07:04

## 2025-04-14 RX ADMIN — IBUPROFEN 800 MG: 400 TABLET ORAL at 06:04

## 2025-04-14 NOTE — DISCHARGE SUMMARY
[facility] - [facility] Intensive Care  Cardiology  Discharge Summary      Patient Name: Shekhar Snyder  MRN: [id number]  Admission Date: 4/11/2025  Hospital Length of Stay: 3 days  Discharge Date and Time: 04/14/2025 10:57 AM  Attending Physician: Ernesto iWng MD    Discharging Provider: Rowena Marquez DO  Primary Care Physician: Fracisco Phipps MD    HPI:   Mr Snyder comes to the ED from outpatient cardiology office with Dr Kelly for massive pericardial effusion.     24-year-old male with PMH for MDD, ADHD and PTSD who initially developed symptoms 4-5 days prior to hospital admission on 03/10/2025, reporting chest pain when lying down and dyspnea. Symptoms persisted and worsened over four days, prompting him to seek medical attention.     In the hospital, he was diagnosed with a pericardial effusion/acute pericarditis and treated with high-dose ibuprofen (800 mg q8h). He was discharged on colchicine 0.6 mg daily and instructed to taper ibuprofen over a week or as pain lessened. He reports that pain never fully subsided, and he continued taking ibuprofen intermittently.  GI symptoms were limiting his ibuprofen use.     5 days ago, he had a significant flare-up of symptoms. Pain was significant enough that he left work on Monday night. He restarted high-dose ibuprofen, taking up to 5 tablets (1000 mg) q8h, 2-3 times daily. He has been out of work since Monday due to pain.     He reports that today has been one of his best days symptom-wise, which he attributes to resting and avoiding physical exertion. He still has discomfort when lying flat on his back, a symptom that has persisted since March. He also notes pain that radiates from his heart to his shoulder, particularly on symptomatic days, as well as pain in the neck area and discomfort when lying on it.     He had influenza A 1 month before his March hospitalization, which kept him sick for a whole week. This is suspected to be the trigger for his  pericarditis.     He has a family history of recurrent pericarditis, with his mother also affected by the condition.     He denies any previous diagnoses of pericarditis prior to 03/2025 or a history of other relevant medical conditions or inflammatory diagnoses.    Procedure(s) (LRB):  Pericardiocentesis (N/A)     Indwelling Lines/Drains at time of discharge:  Lines/Drains/Airways       Drain  Duration                  Drain/Device  04/11/25 2037 Left chest other (see comments) 2 days                    Hospital Course:  Admitted for pericarditis and pericardial effusion with tamponade physiology. 1.6L of pericardial fluid were drained by interventional cardiology. Started on ibuprofen and colchicine. Drainage improved.     Patient was medically cleared for discharge with close follow up with outpatient cardiologist.     Physical Exam  Vitals and nursing note reviewed.   Constitutional:       General: He is not in acute distress.     Appearance: Normal appearance. He is not ill-appearing.   HENT:      Head: Normocephalic and atraumatic.   Eyes:      General: No scleral icterus.     Conjunctiva/sclera: Conjunctivae normal.   Cardiovascular:      Rate and Rhythm: Normal rate and regular rhythm.   Pulmonary:      Effort: Pulmonary effort is normal. No respiratory distress.      Breath sounds: Normal breath sounds. No wheezing.   Musculoskeletal:      Right lower leg: No edema.      Left lower leg: No edema.   Skin:     General: Skin is warm and dry.   Neurological:      General: No focal deficit present.      Mental Status: He is alert and oriented to person, place, and time.         Goals of Care Treatment Preferences:  Code Status: Full Code      Consults:   Consults (From admission, onward)          Status Ordering Provider     Inpatient consult to Cardiology  Once        Provider:  (Not yet assigned)    PRANAV Cruz            Significant Diagnostic Studies: Cardiac Graphics: Echocardiogram:  "Transthoracic echo (TTE) complete (Cupid Only):   Results for orders placed or performed during the hospital encounter of 03/10/25   Echo   Result Value Ref Range    BSA 1.83 m2    Johnson's Biplane MOD Ejection Fraction 59 %    A2C EF 50 %    A4C EF 68 %    LVOT stroke volume 81.4 cm3    LVIDd 4.8 3.5 - 6.0 cm    LV Systolic Volume 42 mL    LV Systolic Volume Index 22.5 mL/m2    LVIDs 3.2 2.1 - 4.0 cm    LV ESV A2C 36.26 mL    LV Diastolic Volume 108 mL    LV ESV A4C 35.04 mL    LV Diastolic Volume Index 57.75 mL/m2    LV EDV A2C 88.128707872268639 mL    LV EDV A4C 92.06 mL    Left Ventricular End Systolic Volume by Teichholz Method 41.98 mL    Left Ventricular End Diastolic Volume by Teichholz Method 108.46 mL    IVS 1.0 0.6 - 1.1 cm    LVOT diameter 2.1 cm    LVOT area 3.5 cm2    FS 33.3 28 - 44 %    Left Ventricle Relative Wall Thickness 0.42 cm    PW 1.0 0.6 - 1.1 cm    LV mass 170.2 g    LV Mass Index 91.0 g/m2    MV Peak E Jaden 1.06 m/s    TDI LATERAL 0.08 m/s    TDI SEPTAL 0.09 m/s    E/E' ratio 12 m/s    MV Peak A Jaden 0.68 m/s    TR Max Jaden 2.5 m/s    E/A ratio 1.56     E wave deceleration time 241 msec    MV "A" wave duration [id number] msec    LV SEPTAL E/E' RATIO 11.8 m/s    LV LATERAL E/E' RATIO 13.3 m/s    PV Peak S Jaden 0.40 m/s    PV Peak D Jaden 0.69 m/s    Pulm vein S/D ratio 0.58     LVOT peak jaden 1.2 m/s    Left Ventricular Outflow Tract Mean Velocity 0.77 cm/s    Left Ventricular Outflow Tract Mean Gradient 2.90 mmHg    RV- cai basal diam 4.1 cm    RV S' 12.10 cm/s    TAPSE 2.41 cm    RV/LV Ratio 0.85 cm    LA Vol (MOD) 37 mL    DENZEL (MOD) 20 mL/m2    RA area length vol 30.58 mL    RA Area 12.6 cm2    RA Vol 31.82 mL    AV mean gradient 4 mmHg    AV peak gradient 8 mmHg    Ao peak jaden 1.4 m/s    Ao VTI 27.1 cm    LVOT peak VTI 23.5 cm    AV valve area 3.0 cm²    AV Velocity Ratio 0.86     AV index (prosthetic) 0.87     SAM by Velocity Ratio 3.0 cm²    Mr max jaden 2.05 m/s    MV peak " gradient 4 mmHg    MV stenosis pressure 1/2 time 70.01 ms    MV valve area p 1/2 method 3.14 cm2    MV valve area by continuity eq 3.65 cm2    MV VTI 22.3 cm    Triscuspid Valve Regurgitation Peak Gradient 26 mmHg    PV PEAK VELOCITY 0.99 m/s    PV peak gradient 4 mmHg    Pulmonary Valve Mean Velocity 0.80 m/s    Sinus 3.17 cm    STJ 2.56 cm    Ascending aorta 2.98 cm    IVC diameter 2.40 cm    Mean e' 0.09 m/s    ZLVIDS -0.04     ZLVIDD -0.82     LA area A4C 14.55 cm2    LA area A2C 15.02 cm2    TV resting pulmonary artery pressure 28 mmHg    RV TB RVSP 6 mmHg    Est. RA pres 3 mmHg    Narrative      Left Ventricle: The left ventricle is normal in size. Normal wall   thickness. Normal wall motion. There is normal systolic function with a   visually estimated ejection fraction of 55 - 60%. Quantitated ejection   fraction is 59%. There is normal diastolic function.    Right Ventricle: The right ventricle has mild enlargement. Systolic   function is normal.    Left Atrium: Normal left atrial size.    Aortic Valve: The aortic valve is a trileaflet valve. There is no   significant regurgitation.    Mitral Valve: There is trace regurgitation.    Tricuspid Valve: There is trace regurgitation.    Pulmonary Artery: The estimated pulmonary artery systolic pressure is   28 mmHg.    IVC/SVC: Normal venous pressure at 3 mmHg.    Pericardium: There is a moderate effusion. No indication of cardiac   tamponade. Evidence includes no chamber collapse.         Pending Diagnostic Studies:       Procedure Component Value Units Date/Time    [facility] Miscellaneous Test 1 [[id number]] Collected: 04/11/25 2139    Order Status: Sent Lab Status: In process Updated: 04/14/25 0917    Specimen: Body Fluid     Adenosine Deaminase, Pericardial Fluid [[id number]] Collected: 04/11/25 2045    Order Status: Sent Lab Status: In process Updated: 04/12/25 0112    Specimen: Body Fluid     Albumin, Body Fluid (Reference Lab or Non[facility]) [facility];  Pericardial Fluid [[id number]] Collected: 04/11/25 2045    Order Status: Sent Lab Status: In process Updated: 04/12/25 0215    Specimen: Body Fluid from Pericardial Fluid     Cytology, Fluid/Wash/Brush [[id number]] Collected: 04/11/25 2045    Order Status: Sent Lab Status: No result     Specimen: Body Fluid from Chest, Left     Cytomegalovirus DNA probe, amplified [[id number]] Collected: 04/11/25 2045    Order Status: Sent Lab Status: In process Updated: 04/12/25 0215    Specimen: Body Fluid from Pericardial Fluid     Glucose, Body Fluid (Reference Lab or Non[facility]) [facility]; Pericardial Fluid [[id number]] Collected: 04/11/25 2045    Order Status: Sent Lab Status: In process Updated: 04/12/25 0215    Specimen: Body Fluid     LDH, Body Fluid (Reference Lab or [facility]) [facility]; Body Fluid; Pericardial Fluid [[id number]] Updated: 04/14/25 1049    Order Status: Sent Lab Status: No result     Specimen: Body Fluid from Pericardial Fluid     Miscellaneous Test, Sendout PERICARDIAL LFUID [[id number]] Collected: 04/11/25 2045    Order Status: Sent Lab Status: In process Updated: 04/12/25 0216    Specimen: Pericardial fluid from Other, please specify     pH, body fluid Pericardial Fluid [[id number]] Collected: 04/11/25 2045    Order Status: Sent Lab Status: In process Updated: 04/12/25 0216    Specimen: Body Fluid from Pericardial Fluid             Final Active Diagnoses:    Diagnosis Date Noted POA    PRINCIPAL PROBLEM:  Pericardial effusion [I31.39] 03/11/2025 Yes    Pericarditis [I31.9] 03/12/2025 Yes      Problems Resolved During this Admission:     No new Assessment & Plan notes have been filed under this hospital service since the last note was generated.  Service: Cardiology      Discharged Condition: stable    Disposition: Home or Self Care    Follow Up:   Follow-up Information       Jonathon Kelly MD. Schedule an appointment as soon as possible for a visit in 1 week(s).    Specialties: Interventional Cardiology,  Cardiology  Why: Hospital Follow up  Contact information:  [address]  [address]  [place]  [phone]               Fracisco Phipps MD Follow up on 4/21/2025.    Specialty: Internal Medicine  Why: Hospital follow up established patient @ 1:00 pm. Please bring discharge summary, ID, insurance card, and medication list.  Contact information:  [address]  [address]  [place] [phone]                           Patient Instructions:   No discharge procedures on file.  Medications:  Reconciled Home Medications:      Medication List        CHANGE how you take these medications      ibuprofen 200 MG tablet  Commonly known as: ADVIL,MOTRIN  Take 4 tablets (800 mg total) by mouth every 8 (eight) hours for 14 days, THEN 3 tablets (600 mg total) every 8 (eight) hours for 7 days, THEN 2 tablets (400 mg total) every 8 (eight) hours for 7 days, THEN 1 tablet (200 mg total) every 8 (eight) hours for 7 days.  Start taking on: April 14, 2025  What changed:   medication strength  See the new instructions.            CONTINUE taking these medications      colchicine 0.6 mg tablet  Commonly known as: COLCRYS  Take 1 tablet (0.6 mg total) by mouth 2 (two) times daily.     pantoprazole 40 MG tablet  Commonly known as: PROTONIX  Take 1 tablet (40 mg total) by mouth once daily.     VENTOLIN HFA 90 mcg/actuation inhaler  Generic drug: albuterol  Inhale 2 puffs into the lungs every 6 (six) hours as needed for Wheezing. Rescue              Time spent on the discharge of patient: 30 minutes    Rowena Marquez DO  Cardiology  [facility] - Surgical Intensive Care

## 2025-04-14 NOTE — EICU
Intervention Initiated From:  COR / NARAYANU    Nilda intervened regarding:  Rounding (Video assessment)    VICU Night Rounds Checklist  24H Vital Sign Range:  Temp:  [97.6 °F (36.4 °C)-98.2 °F (36.8 °C)]   Pulse:  [60-99]   Resp:  [10-42]   BP: (107-142)/(60-98)   SpO2:  [92 %-99 %]     Video rounds and LDA reconciliation

## 2025-04-14 NOTE — PLAN OF CARE
SICU PLAN OF CARE    Dx: Pericarditis and pericardial effusion management     Goals of Care: Monitor drain output    Shift Events:  SHASHANK oxycodone 5mg given for drain insertion site pain with relief noted. DIOMEDES.      See flowsheet for further assessment/details.  Family updated on current condition/plan of care, questions answered, and emotional support provided.  MD updated on current condition, vitals, labs, and gtts.    Vital Signs (last 12 hours):   Temp:  [98 °F (36.7 °C)-98.2 °F (36.8 °C)]   Pulse:  [72-99]   Resp:  [18-42]   BP: (108-142)/(71-98)   SpO2:  [94 %-99 %]      Neuro: AAOx4, Follows commands , and Moves all extremities spontaneously     Cardiac: normal sinus rhythm    Respiratory:  2L Nasal Cannula     Urine Output: Voids Spontaneously  2 unmeasured occurrences    Drains: ASHISH #1, total output 6mL /shift    Diet: Cardiac      Labs/Accuchecks: Daily accuchecks. Daily labs.     Skin:  No new skin events noted overnight.  Patient turned q2h, bony prominences protected, and mattress inflated/working correctly.

## 2025-04-14 NOTE — NURSING
Upon discharge, pt stated he was short of breath and found it hard to breathe. Connected to ICU pulse ox with o2 @ 88%. MD notified. Pt connected to 3 L Nasal Cannula. PT connected back to ICU monitoring. Stepdown orders placed.

## 2025-04-14 NOTE — TELEPHONE ENCOUNTER
----- Message from Jonathon Kelly MD sent at 4/14/2025  3:46 PM CDT -----  Please set a follow-up w me in 1-2 weeks.

## 2025-04-14 NOTE — TELEPHONE ENCOUNTER
Per protocol, patient does not meet inclusion criteria for Ambulatory Pharmacy Hyperlipidemia Treatment. Patient does not appear to have been on cholesterol medications in the past, there is no prior lipid panel results and Mr. Magallanes does not have an [facility] PCP.    ----- Message from CHARLI Quintero sent at 2025  7:55 PM CDT -----  Regarding: Order for ROGER MAGALLANES    Patient Name: ROGER MAGALLANES([id number])  Sex: Male  : 2000      PCP: CLAUDE LARSEN    Center: [facility] CENTRAL BILLING OFFICE     Types of orders made on 2025: Activity, Cardiac Cath, Case Request,                                       Consult, Diet, ECG, IV, Lab, Medications,                                       Nursing, Respiratory Care    Order Date:2025  Ordering User:ROSANGELA JACKSON [[id number]]  Attending Provider:Linda Abraham MD [[id number]]  Authorizing Provider: Rosangela Jackson MD [[id number]]  Department:[facility] EMERGENCY DEPARTMENT[[id number]]    Order Specific Information  Order: Notify [facility] [Custom: [id number]]  Order #: [id number]: 1    Priority: Routine  Class: Hospital Performed    Released on: 2025  7:55 PM        Priority: Routine  Class: Hospital Performed    Released on: 2025  7:55 PM

## 2025-04-14 NOTE — DISCHARGE SUMMARY
[facility]  Cardiology  Discharge Summary      Patient Name: Shekhar Snyder  MRN: [id number]  Admission Date: 4/11/2025  Hospital Length of Stay: 3 days  Discharge Date and Time: 04/14/2025 2:42 PM  Attending Physician: Ernesto Wing MD    Discharging Provider: Rowena Marquez DO  Primary Care Physician: Fracisco Phipps MD    HPI:   Mr Snyder comes to the ED from outpatient cardiology office with Dr Kelly for massive pericardial effusion.     24-year-old male with PMH for MDD, ADHD and PTSD who initially developed symptoms 4-5 days prior to hospital admission on 03/10/2025, reporting chest pain when lying down and dyspnea. Symptoms persisted and worsened over four days, prompting him to seek medical attention.     In the hospital, he was diagnosed with a pericardial effusion/acute pericarditis and treated with high-dose ibuprofen (800 mg q8h). He was discharged on colchicine 0.6 mg daily and instructed to taper ibuprofen over a week or as pain lessened. He reports that pain never fully subsided, and he continued taking ibuprofen intermittently.  GI symptoms were limiting his ibuprofen use.     5 days ago, he had a significant flare-up of symptoms. Pain was significant enough that he left work on Monday night. He restarted high-dose ibuprofen, taking up to 5 tablets (1000 mg) q8h, 2-3 times daily. He has been out of work since Monday due to pain.     He reports that today has been one of his best days symptom-wise, which he attributes to resting and avoiding physical exertion. He still has discomfort when lying flat on his back, a symptom that has persisted since March. He also notes pain that radiates from his heart to his shoulder, particularly on symptomatic days, as well as pain in the neck area and discomfort when lying on it.     He had influenza A 1 month before his March hospitalization, which kept him sick for a whole week. This is suspected to be the trigger for his  pericarditis.     He has a family history of recurrent pericarditis, with his mother also affected by the condition.     He denies any previous diagnoses of pericarditis prior to 03/2025 or a history of other relevant medical conditions or inflammatory diagnoses.    Procedure(s) (LRB):  Pericardiocentesis (N/A)     Indwelling Lines/Drains at time of discharge:  Lines/Drains/Airways       None                   Hospital Course:  Admitted for pericarditis and pericardial effusion with tamponade physiology. 1.6L of pericardial fluid were drained by interventional cardiology. Started on ibuprofen and colchicine. Drainage improved with eventual removal of drain. Some pleural effusions were noted, however, O2 saturations remained stable. CXR ordered prior to discharge revealed improvement in pericardial effusion and new pleural reaction atelectasis and/or consolidation change in left lower lobe.     Patient was medically cleared for discharge with close follow up with outpatient cardiologist and referral for rheumatology. 1 week echo was ordered.     Physical Exam  Vitals and nursing note reviewed.   Constitutional:       General: He is not in acute distress.     Appearance: He is not ill-appearing.   HENT:      Head: Normocephalic and atraumatic.   Eyes:      General: No scleral icterus.     Conjunctiva/sclera: Conjunctivae normal.   Cardiovascular:      Rate and Rhythm: Normal rate and regular rhythm.   Pulmonary:      Effort: Pulmonary effort is normal. No respiratory distress.      Breath sounds: Normal breath sounds. No wheezing.   Musculoskeletal:      Right lower leg: No edema.      Left lower leg: No edema.   Skin:     General: Skin is warm and dry.   Neurological:      Mental Status: He is alert.         Goals of Care Treatment Preferences:  Code Status: Full Code      Consults:   Consults (From admission, onward)          Status Ordering Provider     Inpatient consult to Pulmonology  Once        Provider:  (Not  "yet assigned)    Acknowledged KOLE REDDING     Inpatient consult to Cardiology  Once        Provider:  (Not yet assigned)    Completed PRANAV ORTEGA            Significant Diagnostic Studies: Cardiac Graphics: Echocardiogram: Transthoracic echo (TTE) complete (Cupid Only):   Results for orders placed or performed during the hospital encounter of 03/10/25   Echo   Result Value Ref Range    BSA 1.83 m2    Johnson's Biplane MOD Ejection Fraction 59 %    A2C EF 50 %    A4C EF 68 %    LVOT stroke volume 81.4 cm3    LVIDd 4.8 3.5 - 6.0 cm    LV Systolic Volume 42 mL    LV Systolic Volume Index 22.5 mL/m2    LVIDs 3.2 2.1 - 4.0 cm    LV ESV A2C 36.26 mL    LV Diastolic Volume 108 mL    LV ESV A4C 35.04 mL    LV Diastolic Volume Index 57.75 mL/m2    LV EDV A2C 88.209797276171972 mL    LV EDV A4C 92.06 mL    Left Ventricular End Systolic Volume by Teichholz Method 41.98 mL    Left Ventricular End Diastolic Volume by Teichholz Method 108.46 mL    IVS 1.0 0.6 - 1.1 cm    LVOT diameter 2.1 cm    LVOT area 3.5 cm2    FS 33.3 28 - 44 %    Left Ventricle Relative Wall Thickness 0.42 cm    PW 1.0 0.6 - 1.1 cm    LV mass 170.2 g    LV Mass Index 91.0 g/m2    MV Peak E Jaden 1.06 m/s    TDI LATERAL 0.08 m/s    TDI SEPTAL 0.09 m/s    E/E' ratio 12 m/s    MV Peak A Jaden 0.68 m/s    TR Max Jaden 2.5 m/s    E/A ratio 1.56     E wave deceleration time 241 msec    MV "A" wave duration [id number] msec    LV SEPTAL E/E' RATIO 11.8 m/s    LV LATERAL E/E' RATIO 13.3 m/s    PV Peak S Jaden 0.40 m/s    PV Peak D Jaden 0.69 m/s    Pulm vein S/D ratio 0.58     LVOT peak jaden 1.2 m/s    Left Ventricular Outflow Tract Mean Velocity 0.77 cm/s    Left Ventricular Outflow Tract Mean Gradient 2.90 mmHg    RV- cai basal diam 4.1 cm    RV S' 12.10 cm/s    TAPSE 2.41 cm    RV/LV Ratio 0.85 cm    LA Vol (MOD) 37 mL    DENZEL (MOD) 20 mL/m2    RA area length vol 30.58 mL    RA Area 12.6 cm2    RA Vol 31.82 mL    AV mean gradient 4 mmHg    AV peak " gradient 8 mmHg    Ao peak keisha 1.4 m/s    Ao VTI 27.1 cm    LVOT peak VTI 23.5 cm    AV valve area 3.0 cm²    AV Velocity Ratio 0.86     AV index (prosthetic) 0.87     SAM by Velocity Ratio 3.0 cm²    Mr max keisha 2.05 m/s    MV peak gradient 4 mmHg    MV stenosis pressure 1/2 time 70.01 ms    MV valve area p 1/2 method 3.14 cm2    MV valve area by continuity eq 3.65 cm2    MV VTI 22.3 cm    Triscuspid Valve Regurgitation Peak Gradient 26 mmHg    PV PEAK VELOCITY 0.99 m/s    PV peak gradient 4 mmHg    Pulmonary Valve Mean Velocity 0.80 m/s    Sinus 3.17 cm    STJ 2.56 cm    Ascending aorta 2.98 cm    IVC diameter 2.40 cm    Mean e' 0.09 m/s    ZLVIDS -0.04     ZLVIDD -0.82     LA area A4C 14.55 cm2    LA area A2C 15.02 cm2    TV resting pulmonary artery pressure 28 mmHg    RV TB RVSP 6 mmHg    Est. RA pres 3 mmHg    Narrative      Left Ventricle: The left ventricle is normal in size. Normal wall   thickness. Normal wall motion. There is normal systolic function with a   visually estimated ejection fraction of 55 - 60%. Quantitated ejection   fraction is 59%. There is normal diastolic function.    Right Ventricle: The right ventricle has mild enlargement. Systolic   function is normal.    Left Atrium: Normal left atrial size.    Aortic Valve: The aortic valve is a trileaflet valve. There is no   significant regurgitation.    Mitral Valve: There is trace regurgitation.    Tricuspid Valve: There is trace regurgitation.    Pulmonary Artery: The estimated pulmonary artery systolic pressure is   28 mmHg.    IVC/SVC: Normal venous pressure at 3 mmHg.    Pericardium: There is a moderate effusion. No indication of cardiac   tamponade. Evidence includes no chamber collapse.         Pending Diagnostic Studies:       Procedure Component Value Units Date/Time    [facility] Miscellaneous Test 1 [[id number]] Collected: 04/11/25 2139    Order Status: Sent Lab Status: In process Updated: 04/14/25 8226    Specimen: Body Fluid     Adenosine  Deaminase, Pericardial Fluid [[id number]] Collected: 04/11/25 2045    Order Status: Sent Lab Status: In process Updated: 04/12/25 0112    Specimen: Body Fluid     Albumin, Body Fluid (Reference Lab or Non-[facility]) [facility]; Pericardial Fluid [[id number]] Collected: 04/11/25 2045    Order Status: Sent Lab Status: In process Updated: 04/12/25 0215    Specimen: Body Fluid from Pericardial Fluid     Cytomegalovirus DNA probe, amplified [[id number]] Collected: 04/11/25 2045    Order Status: Sent Lab Status: In process Updated: 04/12/25 0215    Specimen: Body Fluid from Pericardial Fluid     Glucose, Body Fluid (Reference Lab or Non[facility]) [facility]; Pericardial Fluid [[id number]] Collected: 04/11/25 2045    Order Status: Sent Lab Status: In process Updated: 04/12/25 0215    Specimen: Body Fluid     LDH, Body Fluid (Reference Lab or Non[facility]) [facility]; Body Fluid; Pericardial Fluid [[id number]] Collected: 04/14/25 1122    Order Status: Sent Lab Status: In process Updated: 04/14/25 1128    Specimen: Body Fluid from Pericardial Fluid     Miscellaneous Test, Sendout PERICARDIAL LFUID [[id number]] Collected: 04/11/25 2045    Order Status: Sent Lab Status: In process Updated: 04/12/25 0216    Specimen: Pericardial fluid from Other, please specify     pH, body fluid Pericardial Fluid [[id number]] Collected: 04/11/25 2045    Order Status: Sent Lab Status: In process Updated: 04/12/25 0216    Specimen: Body Fluid from Pericardial Fluid             Final Active Diagnoses:    Diagnosis Date Noted POA    PRINCIPAL PROBLEM:  Pericardial effusion [I31.39] 03/11/2025 Yes    Pericarditis [I31.9] 03/12/2025 Yes      Problems Resolved During this Admission:     No new Assessment & Plan notes have been filed under this hospital service since the last note was generated.  Service: Cardiology      Discharged Condition: stable    Disposition: Home or Self Care    Follow Up:   Follow-up Information       Jonathon Kelly MD. Schedule an  appointment as soon as possible for a visit in 1 week(s).    Specialties: Interventional Cardiology, Cardiology  Why: Hospital Follow up  Contact information:  [address] [facility]  [address]  [place] [phone]               Fracisco Phipps MD Follow up on 4/21/2025.    Specialty: Internal Medicine  Why: Hospital follow up established patient @ 1:00 pm. Please bring discharge summary, ID, insurance card, and medication list.  Contact information:  [address]  [address]  [place] [phone]                           Patient Instructions:      Ambulatory referral/consult to Rheumatology   Standing Status: Future   Referral Priority: Routine Referral Type: Consultation   Referral Reason: Specialty Services Required   Requested Specialty: Rheumatology   Number of Visits Requested: 1     Echo Saline Bubble? No   Standing Status: Future Standing Exp. Date: 04/14/26   Order Comments: F/u pericardial effusion     Order Specific Question Answer Comments   Saline Bubble? No    Release to patient Immediate      Medications:  Reconciled Home Medications:      Medication List        CHANGE how you take these medications      ibuprofen 200 MG tablet  Commonly known as: ADVIL,MOTRIN  Take 4 tablets (800 mg total) by mouth every 8 (eight) hours for 14 days, THEN 3 tablets (600 mg total) every 8 (eight) hours for 7 days, THEN 2 tablets (400 mg total) every 8 (eight) hours for 7 days, THEN 1 tablet (200 mg total) every 8 (eight) hours for 7 days.  Start taking on: April 14, 2025  What changed:   medication strength  See the new instructions.            CONTINUE taking these medications      colchicine 0.6 mg tablet  Commonly known as: COLCRYS  Take 1 tablet (0.6 mg total) by mouth 2 (two) times daily.     pantoprazole 40 MG tablet  Commonly known as: PROTONIX  Take 1 tablet (40 mg total) by mouth once daily.     VENTOLIN HFA 90 mcg/actuation inhaler  Generic drug: albuterol  Inhale 2 puffs into  the lungs every 6 (six) hours as needed for Wheezing. Rescue              Time spent on the discharge of patient: 40 minutes    Rowena Marquez DO  Cardiology  [facility]

## 2025-04-14 NOTE — SUBJECTIVE & OBJECTIVE
Interval History: Pt was prepared for discharge, pericardial drain was pulled. However, prior to discharge, O2 saturations decreased to 88% and CXR showed pleural effusions.    ROS  Objective:     Vital Signs (Most Recent):  Temp: 97.8 °F (36.6 °C) (04/14/25 1100)  Pulse: 79 (04/14/25 1445)  Resp: 14 (04/14/25 1445)  BP: 132/88 (04/14/25 1200)  SpO2: 100 % (04/14/25 1445) Vital Signs (24h Range):  Temp:  [97.8 °F (36.6 °C)-98.2 °F (36.8 °C)] 97.8 °F (36.6 °C)  Pulse:  [] 79  Resp:  [14-42] 14  SpO2:  [92 %-100 %] 100 %  BP: ()/(54-91) 132/88     Weight: 76.5 kg (168 lb 10.4 oz)  Body mass index is 21.08 kg/m².     SpO2: 100 %         Intake/Output Summary (Last 24 hours) at 4/14/2025 1512  Last data filed at 4/14/2025 1330  Gross per 24 hour   Intake 360 ml   Output 609 ml   Net -249 ml       Lines/Drains/Airways       Peripheral Intravenous Line  Duration                  Peripheral IV - Single Lumen 04/14/25 1441 20 G Left Antecubital <1 day                       Physical Exam  Vitals and nursing note reviewed.   Constitutional:       General: He is not in acute distress.     Appearance: Normal appearance. He is not ill-appearing.   HENT:      Head: Normocephalic and atraumatic.   Cardiovascular:      Rate and Rhythm: Normal rate and regular rhythm.   Pulmonary:      Effort: Pulmonary effort is normal. No respiratory distress.      Breath sounds: Normal breath sounds. No wheezing.   Chest:      Comments: Drain removed.  Skin:     General: Skin is warm and dry.   Neurological:      General: No focal deficit present.      Mental Status: He is oriented to person, place, and time.            Significant Labs: All pertinent lab results from the last 24 hours have been reviewed.    Significant Imaging:   X-Ray Chest AP Portable  Narrative: EXAMINATION:  XR CHEST AP PORTABLE    CLINICAL HISTORY:  Pleural effusion;    TECHNIQUE:  Single frontal view of the chest was performed.    COMPARISON:  04/11/2025, CTA  chest 03/10/2025    FINDINGS:  Clearing of severe water bottle configuration cardiomegaly, remaining mild cardiomegaly, status post suspected drainage of pericardial effusion.  New pleural effusion, consolidation, atelectasis left lower lobe, obscuring dome left hemidiaphragm in the interim.  New linea atelectasis basilar segment right lower lobe.  Postop ORIF therapy left clavicle, stable.  Impression: Status post suspected pericardial effusion removal.    New pleural reaction atelectasis and/or consolidation change left lower lobe, new discoid atelectasis right lung base appearing in the interim.    Electronically signed by: Joshua Iniguez MD  Date:    04/14/2025  Time:    14:21

## 2025-04-14 NOTE — EICU
Virtual ICU Quality Rounds    Admit Date: 4/11/2025  Hospital Day: 3    ICU Day: 2d 14h    24H Vital Sign Range:  Temp:  [97.8 °F (36.6 °C)-98.2 °F (36.8 °C)]   Pulse:  []   Resp:  [15-42]   BP: ()/(54-91)   SpO2:  [92 %-100 %]     VICU Surveillance Screening                    LDA reconciliation : Yes

## 2025-04-15 ENCOUNTER — TELEPHONE (OUTPATIENT)
Dept: CARDIOLOGY | Facility: CLINIC | Age: 25
End: 2025-04-15
Payer: COMMERCIAL

## 2025-04-15 PROBLEM — J90 PLEURAL EFFUSION: Status: ACTIVE | Noted: 2025-04-15

## 2025-04-15 LAB
ABSOLUTE EOSINOPHIL (OHS): 0.52 K/UL
ABSOLUTE MONOCYTE (OHS): 0.8 K/UL (ref 0.3–1)
ABSOLUTE NEUTROPHIL COUNT (OHS): 5.15 K/UL (ref 1.8–7.7)
ALBUMIN SERPL BCP-MCNC: 2.4 G/DL (ref 3.5–5.2)
ALP SERPL-CCNC: 48 UNIT/L (ref 40–150)
ALT SERPL W/O P-5'-P-CCNC: 22 UNIT/L (ref 10–44)
ANION GAP (OHS): 6 MMOL/L (ref 8–16)
APTT PPP: 25.6 SECONDS (ref 21–32)
AST SERPL-CCNC: 17 UNIT/L (ref 11–45)
BACTERIA SPEC AEROBE CULT: NO GROWTH
BASOPHILS # BLD AUTO: 0.05 K/UL
BASOPHILS NFR BLD AUTO: 0.5 %
BILIRUB SERPL-MCNC: 0.2 MG/DL (ref 0.1–1)
BODY FLD TYPE: NORMAL
BODY FLD TYPE: NORMAL
BUN SERPL-MCNC: 10 MG/DL (ref 6–20)
CALCIUM SERPL-MCNC: 7.1 MG/DL (ref 8.7–10.5)
CHLORIDE SERPL-SCNC: 116 MMOL/L (ref 95–110)
CO2 SERPL-SCNC: 21 MMOL/L (ref 23–29)
CREAT SERPL-MCNC: 0.6 MG/DL (ref 0.5–1.4)
CRP SERPL-MCNC: 23 MG/L
ERYTHROCYTE [DISTWIDTH] IN BLOOD BY AUTOMATED COUNT: 13.2 % (ref 11.5–14.5)
GFR SERPLBLD CREATININE-BSD FMLA CKD-EPI: >60 ML/MIN/1.73/M2
GLUCOSE FLD-MCNC: 87 MG/DL
GLUCOSE SERPL-MCNC: 84 MG/DL (ref 70–110)
HCT VFR BLD AUTO: 37.5 % (ref 40–54)
HGB BLD-MCNC: 12.2 GM/DL (ref 14–18)
IMM GRANULOCYTES # BLD AUTO: 0.03 K/UL (ref 0–0.04)
IMM GRANULOCYTES NFR BLD AUTO: 0.3 % (ref 0–0.5)
LDH FLD L TO P-CCNC: 264 U/L
LYMPHOCYTES # BLD AUTO: 2.71 K/UL (ref 1–4.8)
MAGNESIUM SERPL-MCNC: 1.8 MG/DL (ref 1.6–2.6)
MCH RBC QN AUTO: 28.3 PG (ref 27–31)
MCHC RBC AUTO-ENTMCNC: 32.5 G/DL (ref 32–36)
MCV RBC AUTO: 87 FL (ref 82–98)
NUCLEATED RBC (/100WBC) (OHS): 0 /100 WBC
PLATELET # BLD AUTO: 335 K/UL (ref 150–450)
PMV BLD AUTO: 10.5 FL (ref 9.2–12.9)
POCT GLUCOSE: 86 MG/DL (ref 70–110)
POTASSIUM SERPL-SCNC: 3.4 MMOL/L (ref 3.5–5.1)
PROT SERPL-MCNC: 5.2 GM/DL (ref 6–8.4)
RBC # BLD AUTO: 4.31 M/UL (ref 4.6–6.2)
RELATIVE EOSINOPHIL (OHS): 5.6 %
RELATIVE LYMPHOCYTE (OHS): 29.3 % (ref 18–48)
RELATIVE MONOCYTE (OHS): 8.6 % (ref 4–15)
RELATIVE NEUTROPHIL (OHS): 55.7 % (ref 38–73)
SODIUM SERPL-SCNC: 143 MMOL/L (ref 136–145)
WBC # BLD AUTO: 9.26 K/UL (ref 3.9–12.7)

## 2025-04-15 PROCEDURE — 25000003 PHARM REV CODE 250: Performed by: STUDENT IN AN ORGANIZED HEALTH CARE EDUCATION/TRAINING PROGRAM

## 2025-04-15 PROCEDURE — 94640 AIRWAY INHALATION TREATMENT: CPT

## 2025-04-15 PROCEDURE — 99223 1ST HOSP IP/OBS HIGH 75: CPT | Mod: ,,, | Performed by: EMERGENCY MEDICINE

## 2025-04-15 PROCEDURE — 99223 1ST HOSP IP/OBS HIGH 75: CPT | Mod: ,,, | Performed by: INTERNAL MEDICINE

## 2025-04-15 PROCEDURE — 27000221 HC OXYGEN, UP TO 24 HOURS

## 2025-04-15 PROCEDURE — 85730 THROMBOPLASTIN TIME PARTIAL: CPT | Performed by: STUDENT IN AN ORGANIZED HEALTH CARE EDUCATION/TRAINING PROGRAM

## 2025-04-15 PROCEDURE — 99900035 HC TECH TIME PER 15 MIN (STAT)

## 2025-04-15 PROCEDURE — 99233 SBSQ HOSP IP/OBS HIGH 50: CPT | Mod: ,,, | Performed by: INTERNAL MEDICINE

## 2025-04-15 PROCEDURE — 83735 ASSAY OF MAGNESIUM: CPT | Performed by: STUDENT IN AN ORGANIZED HEALTH CARE EDUCATION/TRAINING PROGRAM

## 2025-04-15 PROCEDURE — 86141 C-REACTIVE PROTEIN HS: CPT | Performed by: STUDENT IN AN ORGANIZED HEALTH CARE EDUCATION/TRAINING PROGRAM

## 2025-04-15 PROCEDURE — 63600175 PHARM REV CODE 636 W HCPCS: Performed by: STUDENT IN AN ORGANIZED HEALTH CARE EDUCATION/TRAINING PROGRAM

## 2025-04-15 PROCEDURE — 80053 COMPREHEN METABOLIC PANEL: CPT | Performed by: STUDENT IN AN ORGANIZED HEALTH CARE EDUCATION/TRAINING PROGRAM

## 2025-04-15 PROCEDURE — 85025 COMPLETE CBC W/AUTO DIFF WBC: CPT | Performed by: STUDENT IN AN ORGANIZED HEALTH CARE EDUCATION/TRAINING PROGRAM

## 2025-04-15 PROCEDURE — 94761 N-INVAS EAR/PLS OXIMETRY MLT: CPT

## 2025-04-15 PROCEDURE — 25000242 PHARM REV CODE 250 ALT 637 W/ HCPCS: Performed by: STUDENT IN AN ORGANIZED HEALTH CARE EDUCATION/TRAINING PROGRAM

## 2025-04-15 PROCEDURE — 20600001 HC STEP DOWN PRIVATE ROOM

## 2025-04-15 RX ORDER — POTASSIUM CHLORIDE 750 MG/1
50 CAPSULE, EXTENDED RELEASE ORAL ONCE
Status: DISCONTINUED | OUTPATIENT
Start: 2025-04-15 | End: 2025-04-15

## 2025-04-15 RX ORDER — POTASSIUM CHLORIDE 750 MG/1
50 CAPSULE, EXTENDED RELEASE ORAL ONCE
Status: COMPLETED | OUTPATIENT
Start: 2025-04-15 | End: 2025-04-15

## 2025-04-15 RX ORDER — MAGNESIUM SULFATE HEPTAHYDRATE 40 MG/ML
2 INJECTION, SOLUTION INTRAVENOUS ONCE
Status: COMPLETED | OUTPATIENT
Start: 2025-04-15 | End: 2025-04-15

## 2025-04-15 RX ADMIN — IBUPROFEN 800 MG: 400 TABLET ORAL at 05:04

## 2025-04-15 RX ADMIN — POTASSIUM CHLORIDE 50 MEQ: 750 CAPSULE, EXTENDED RELEASE ORAL at 04:04

## 2025-04-15 RX ADMIN — PANTOPRAZOLE SODIUM 40 MG: 40 TABLET, DELAYED RELEASE ORAL at 08:04

## 2025-04-15 RX ADMIN — MAGNESIUM SULFATE HEPTAHYDRATE 2 G: 40 INJECTION, SOLUTION INTRAVENOUS at 04:04

## 2025-04-15 RX ADMIN — COLCHICINE 0.6 MG: 0.6 TABLET, FILM COATED ORAL at 08:04

## 2025-04-15 RX ADMIN — IBUPROFEN 800 MG: 400 TABLET ORAL at 08:04

## 2025-04-15 RX ADMIN — IBUPROFEN 800 MG: 400 TABLET ORAL at 01:04

## 2025-04-15 RX ADMIN — ALBUTEROL SULFATE 2.5 MG: 2.5 SOLUTION RESPIRATORY (INHALATION) at 07:04

## 2025-04-15 NOTE — MED STUDENT ASSESSMENT & PLAN
* Pericardial effusion  Symptomatic with pain and SOB, elevations in CRP. EKG unremarkable. Hemodynamics stable, however, echo showed massive effusion, CVP 15, RV diastolic collapse and early cardiac tamponade, consequently, underwent STAT pericardiocentesis and 1.6 L fluid removed with drain left in place.   F/u studies  Continue colchicine and ibuprofen           Pleural effusion  Patient found to have new pleural effusion and/or consolidation change in LLL noted on bedside echo and CXR done after removal of pericardial drain. Pt was preparing to discharge when O2 saturations dropped. Could be related to inflammatory process from pericarditis.   Pulm consulted - recommended holding off on thoracentesis, effusion small and do not think is cause of desat and SOB        Pericarditis  Sent to hospital from outpatient cardiology follow up for initial episode of pericarditis from March. Etiology unknown, thought to be 2/2 to influenza infection from February. Incomplete adherence to ibuprofen and colchicine 2/2 GI side effects. Unclear if current presentation is recurrent pericarditis vs incompletely treated primary episode.    Continue ibuprofen and colchicine  PPI  If no resolution of symptoms, can consider steroid course

## 2025-04-15 NOTE — HPI
"Shekhar Snyder is a 25yo M with PMH of post viral/Influenza A pericarditis, mild intermittent asthma, daily marijuana use, prior tobacco/nicotine use (quit 2-3 mos ago), ADHD, PTSD, depression.    Recent Pertinent History:  - 2/6/25: Seen in urgent care and dx with Influenza A - with symptoms of headaches, myalgia, nausea, vomiting, diarrhea, cough, congestion, MCCALLUM x 3-5 days  - 3/10-12/25: Admitted with complaints of mid sternal chest pain/tightness, dyspnea, lightheadedness x 4 days, symptoms worse with lying back, leaning forward, exertion, or deep inhalation - managed for suspected post viral pericarditis, with NSAIDs and colchicine  - Echo (3/11/25) with moderate pericardial effusion without tamponade  - Developed GI side effects with high dose ibuprofen, so decreased and stopped it eventually  - Symptoms never completely resolved but had improved in the interim    Current Hospitalization (4/11/25-current):  - Seen by outpt cardiology on 4/11 and found to have persistent vs recurrent large effusion, now with tamponade  - Therefore admitted to hospital for acute pericarditis/effusion/tamponade management  - 4/11: taken to cath lab for pericardiocentesis, s/p removal of 1500cc serous fluid  - Restarted high dose NSAIDs (ibuprofen 800mg Q8h) and colchicine 0.6mg BID  - Pt was going to be d/c'd on 4/14 but got hypoxic and found to have pleural effusion on US and CXR, so stayed in hospital  - Pulmonology evaluated, no indication for thoracentesis at this time    Rheumatology was consulted for "Recurrent pericarditis, pleural effusion, and family hx of pericarditis. Concerns for autoimmune process? Connective tissue disorder?"    On initial evaluation, pt endorses the above hx. History is obtained from pt and his mother at bedside. Continues to have some dyspnea, cough, and chest tightness, O2 sats drop with any exertion. Denies any current or recent h/o fevers, chills, other infectious/sick contacts (other than prior " flu in February), nausea, vomiting, GI changes, urinary changes, joint pain, joint swelling, joint stiffness (other that occasional mild ankle stiffness), skin rash/changes, oral/nasal ulcers, inflammatory eye problems, alopecia. Pt endorses some light sensitivity in his eyes.     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

## 2025-04-15 NOTE — ASSESSMENT & PLAN NOTE
Shekhar Snyder is a 25yo M with PMH of post viral/Influenza A pericarditis, mild intermittent asthma, daily marijuana use, prior tobacco/nicotine use (quit 2-3 mos ago), ADHD, PTSD, depression.    - Pt with acute pericarditis (initially dx 3/2025), suspected to be post viral after Influenza A infection 1 month prior to initial episode  - Pt did not complete full course/tx with high dose NSAIDs and colchicine  - Then found to have large pericardial effusion w/ tamponade requiring pericardiocentesis now (4/11/25)  - Symptoms initially improved, but have not resolved - with persistent chest pain/tightness, dyspnea, and cough  - Now with small pleural effusion as well, pulm feels some mucus plugging and atelectasis  - Pt with no other current symptoms indicative of a systemic autoimmune rheumatologic process  - Labs showed negative DOM/EMERY profile (DOM was only 1:80)  - Pulmonology following and completed bronch on 4/17 - mucus plugging and purulent secretions cleared, likely infectious in nature    Plan/Recommendations:  - Agree with acute pericarditis management per cardio with high dose NSAIDs/colchicine and abx for pna per pulm/primary teams  - If symptoms do not resolve and/or pt does develop recurrent episode despite full adequate tx with NSAIDs/colchicine - cardio could consider use of IL-1 inhibitor  - Follow up on ordered/pending lab workup  - Consider obtaining autoinflammatory gene panel especially with known family hx of recurrent pericarditis - outpatient e-consult to genetics has been placed to evaluate for this  - No additional inpatient recommendations, pt ok to discharge from rheum perspective

## 2025-04-15 NOTE — EICU
Virtual ICU Quality Rounds    Admit Date: 4/11/2025  Hospital Day: 4    ICU Day: 3d 16h    24H Vital Sign Range:  Temp:  [97 °F (36.1 °C)-98.3 °F (36.8 °C)]   Pulse:  []   Resp:  [14-39]   BP: ()/(54-91)   SpO2:  [92 %-100 %]     VICU Surveillance Screening                    LDA reconciliation : Yes

## 2025-04-15 NOTE — PROGRESS NOTES
[facility]  Cardiology  Progress Note    Patient Name: Shekhar Snyder  MRN: [id number]  Admission Date: 4/11/2025  Hospital Length of Stay: 4 days  Code Status: Full Code   Attending Physician: Ernesto Wing MD   Primary Care Physician: Fracisco Phipps MD  Expected Discharge Date: 4/16/2025  Principal Problem:Pericardial effusion    Subjective:     Hospital Course:   Admitted for pericarditis and pericardial effusion with tamponade physiology. 1.6L of pericardial fluid were drained by interventional cardiology. Started on ibuprofen and colchicine. Drainage improved with eventual removal of drain. Some pleural effusions were noted, however, O2 saturations remained stable. CXR ordered prior to discharge revealed improvement in pericardial effusion and new pleural reaction atelectasis and/or consolidation change in left lower lobe.     Patient was medically cleared for discharge with close follow up with outpatient cardiologist and referral for rheumatology. 1 week echo was ordered. Prior to discharge patient became hypoxic, was ultimately stepped down and pulmonology was consulted for left sided pleural effusion.     Interval History: Pt was prepared for discharge, pericardial drain was pulled. However, prior to discharge, O2 saturations decreased to 88% and CXR showed pleural effusions.    ROS  Objective:     Vital Signs (Most Recent):  Temp: 97.8 °F (36.6 °C) (04/14/25 1100)  Pulse: 79 (04/14/25 1445)  Resp: 14 (04/14/25 1445)  BP: 132/88 (04/14/25 1200)  SpO2: 100 % (04/14/25 1445) Vital Signs (24h Range):  Temp:  [97.8 °F (36.6 °C)-98.2 °F (36.8 °C)] 97.8 °F (36.6 °C)  Pulse:  [] 79  Resp:  [14-42] 14  SpO2:  [92 %-100 %] 100 %  BP: ()/(54-91) 132/88     Weight: 76.5 kg (168 lb 10.4 oz)  Body mass index is 21.08 kg/m².     SpO2: 100 %         Intake/Output Summary (Last 24 hours) at 4/14/2025 1512  Last data filed at 4/14/2025 1330  Gross per 24 hour   Intake 360 ml    Output 609 ml   Net -249 ml       Lines/Drains/Airways       Peripheral Intravenous Line  Duration                  Peripheral IV - Single Lumen 04/14/25 1441 20 G Left Antecubital <1 day                       Physical Exam  Vitals and nursing note reviewed.   Constitutional:       General: He is not in acute distress.     Appearance: Normal appearance. He is not ill-appearing.   HENT:      Head: Normocephalic and atraumatic.   Cardiovascular:      Rate and Rhythm: Normal rate and regular rhythm.   Pulmonary:      Effort: Pulmonary effort is normal. No respiratory distress.      Breath sounds: Normal breath sounds. No wheezing.   Chest:      Comments: Drain removed.  Skin:     General: Skin is warm and dry.   Neurological:      General: No focal deficit present.      Mental Status: He is oriented to person, place, and time.            Significant Labs: All pertinent lab results from the last 24 hours have been reviewed.    Significant Imaging:   X-Ray Chest AP Portable  Narrative: EXAMINATION:  XR CHEST AP PORTABLE    CLINICAL HISTORY:  Pleural effusion;    TECHNIQUE:  Single frontal view of the chest was performed.    COMPARISON:  04/11/2025, CTA chest 03/10/2025    FINDINGS:  Clearing of severe water bottle configuration cardiomegaly, remaining mild cardiomegaly, status post suspected drainage of pericardial effusion.  New pleural effusion, consolidation, atelectasis left lower lobe, obscuring dome left hemidiaphragm in the interim.  New linea atelectasis basilar segment right lower lobe.  Postop ORIF therapy left clavicle, stable.  Impression: Status post suspected pericardial effusion removal.    New pleural reaction atelectasis and/or consolidation change left lower lobe, new discoid atelectasis right lung base appearing in the interim.    Electronically signed by: Joshua Iniguez MD  Date:    04/14/2025  Time:    14:21    Assessment and Plan:     * Pericardial effusion  Symptomatic with pain and SOB,  elevations in CRP. EKG unremarkable. Hemodynamics stable, however, echo showed massive effusion, CVP 15, RV diastolic collapse and early cardiac tamponade, consequently, underwent STAT pericardiocentesis and 1.6 L fluid removed with drain left in place.   F/u studies  Continue colchicine and ibuprofen         Pleural effusion  Patient found to have new pleural effusion and/or consolidation change in LLL noted on bedside echo and CXR done after removal of pericardial drain. Pt was preparing to discharge when O2 saturations dropped. Could be related to inflammatory process from pericarditis.   Pulm consulted - thora vs diuresis       Pericarditis  Sent to hospital from outpatient cardiology follow up for initial episode of pericarditis from March. Etiology unknown, thought to be 2/2 to influenza infection from February. Incomplete adherence to ibuprofen and colchicine 2/2 GI side effects. Unclear if current presentation is recurrent pericarditis vs incompletely treated primary episode.    Continue ibuprofen and colchicine  PPI  If no resolution of symptoms, can consider steroid course        VTE Risk Mitigation (From admission, onward)           Ordered     IP VTE HIGH RISK PATIENT  Once         04/11/25 2151     Place sequential compression device  Until discontinued         04/11/25 2151                    Rowena Marquez DO  Cardiology  [facility] - Surgical Intensive [facility]

## 2025-04-15 NOTE — MED STUDENT SUBJECTIVE & OBJECTIVE
Medical Student Subjective & Objective     Principal Problem: Pericardial effusion    Chief Complaint:   Chief Complaint   Patient presents with    Chest Pain     Pt has diagnosed pericarditis and was seen by cardiologist today with imaging. Pt was advised to come to the ER.       HPI: Mr Snyder comes to the ED from outpatient cardiology office with Dr Kelly for massive pericardial effusion.     24-year-old male with PMH for MDD, ADHD and PTSD who initially developed symptoms 4-5 days prior to hospital admission on 03/10/2025, reporting chest pain when lying down and dyspnea. Symptoms persisted and worsened over four days, prompting him to seek medical attention.     In the hospital, he was diagnosed with a pericardial effusion/acute pericarditis and treated with high-dose ibuprofen (800 mg q8h). He was discharged on colchicine 0.6 mg daily and instructed to taper ibuprofen over a week or as pain lessened. He reports that pain never fully subsided, and he continued taking ibuprofen intermittently.  GI symptoms were limiting his ibuprofen use.     5 days ago, he had a significant flare-up of symptoms. Pain was significant enough that he left work on Monday night. He restarted high-dose ibuprofen, taking up to 5 tablets (1000 mg) q8h, 2-3 times daily. He has been out of work since Monday due to pain.     He reports that today has been one of his best days symptom-wise, which he attributes to resting and avoiding physical exertion. He still has discomfort when lying flat on his back, a symptom that has persisted since March. He also notes pain that radiates from his heart to his shoulder, particularly on symptomatic days, as well as pain in the neck area and discomfort when lying on it.     He had influenza A 1 month before his March hospitalization, which kept him sick for a whole week. This is suspected to be the trigger for his pericarditis.     He has a family history of recurrent pericarditis, with his mother also  affected by the condition.     He denies any previous diagnoses of pericarditis prior to 03/2025 or a history of other relevant medical conditions or inflammatory diagnoses.    Hospital Course: Admitted for pericarditis and pericardial effusion with tamponade physiology. 1.6L of pericardial fluid were drained by interventional cardiology. Started on ibuprofen and colchicine. Drainage improved with eventual removal of drain. Some pleural effusions were noted, however, O2 saturations remained stable. CXR ordered prior to discharge revealed improvement in pericardial effusion and new pleural reaction atelectasis and/or consolidation change in left lower lobe.     Patient was medically cleared for discharge, however, prior to discharge patient became hypoxic, CXR was done which showed pleural effusion/atelectasis and was not discharged. Pulmonology was consulted, deferred thoracentesis, recommended CT chest. Rheumatology was also consulted for evaluation of possible autoimmune processes.     Interval History: NAOE. Patient is doing okay. CRP is trending down. Continuing colchicine and ibuprofen. Waiting for pulmonary consult to decide thoracentesis vs diuresis    Past Medical History:   Diagnosis Date    ADHD (attention deficit hyperactivity disorder)     Depression     PTSD (post-traumatic stress disorder)        Past Surgical History:   Procedure Laterality Date    ADENOIDECTOMY      CIRCUMCISION      OPEN REDUCTION AND INTERNAL FIXATION (ORIF) OF FRACTURE OF CLAVICLE Left 9/20/2022    Procedure: ORIF, FRACTURE, CLAVICLE;  Surgeon: Satya Reynoso IV, MD;  Location: [facility] OR;  Service: Orthopedics;  Laterality: Left;  Beach chair, padded darling stand (no arm krishnamurthy needed), Large C arm come in from head of bed; acumed clavicle system  dian notified 9/19 @ [facility]    PERICARDIOCENTESIS N/A 4/11/2025    Procedure: Pericardiocentesis;  Surgeon: Ismael Laguna Jr., MD;  Location: [facility] CATH LAB;  Service: Cardiology;   "Laterality: N/A;    TONSILLECTOMY      TYMPANOSTOMY TUBE PLACEMENT         Review of patient's allergies indicates:   Allergen Reactions    Omnicef [cefdinir] Edema       No current facility-administered medications on file prior to encounter.     Current Outpatient Medications on File Prior to Encounter   Medication Sig    albuterol (VENTOLIN HFA) 90 mcg/actuation inhaler Inhale 2 puffs into the lungs every 6 (six) hours as needed for Wheezing. Rescue    colchicine (COLCRYS) 0.6 mg tablet Take 1 tablet (0.6 mg total) by mouth 2 (two) times daily.    pantoprazole (PROTONIX) 40 MG tablet Take 1 tablet (40 mg total) by mouth once daily.     Family History       Problem Relation (Age of Onset)    ADD / ADHD Mother    Alcohol abuse Father    Asthma Brother    Bipolar disorder Maternal Uncle    Depression Mother    Drug abuse Father    Personality disorder Father    Physical abuse Paternal Grandmother    Oswald Brock sequence Brother          Tobacco Use    Smoking status: Every Day     Current packs/day: 0.50     Average packs/day: 0.5 packs/day for 5.3 years (2.6 ttl pk-yrs)     Types: Cigarettes, Vaping with nicotine     Start date: 2020     Passive exposure: Never    Smokeless tobacco: Never    Tobacco comments:     Pt states that he started smoking cigarettes and vaping at age 19, but then quit using nicotine in any form 1 month ago when he started feeling ill.  EMR to be updated to reflect "Former Smoker" status when pt remains without relapse as of 2/12//2026.   Substance and Sexual Activity    Alcohol use: No    Drug use: Yes     Types: Marijuana    Sexual activity: Never     Review of Systems   Constitutional:  Negative for chills and fever.   Respiratory:  Positive for shortness of breath (on exertion). Negative for chest tightness.    Cardiovascular:  Positive for chest pain (pleuritic). Negative for palpitations and leg swelling.   Gastrointestinal:  Negative for abdominal distention, abdominal pain, " constipation, diarrhea and nausea.   Skin:  Negative for color change and pallor.   Neurological:  Negative for dizziness and light-headedness.     Objective:     Vital Signs (Most Recent):  Temp: 98.3 °F (36.8 °C) (04/15/25 1000)  Pulse: 81 (04/15/25 1000)  Resp: (!) 22 (04/15/25 1000)  BP: 109/69 (04/15/25 1000)  SpO2: 95 % (04/15/25 1000) Vital Signs (24h Range):  Temp:  [97 °F (36.1 °C)-98.3 °F (36.8 °C)] 98.3 °F (36.8 °C)  Pulse:  [] 81  Resp:  [14-39] 22  SpO2:  [92 %-100 %] 95 %  BP: ()/(54-91) 109/69     Weight: 77 kg (169 lb 12.1 oz)  Body mass index is 21.22 kg/m².    Intake/Output Summary (Last 24 hours) at 4/15/2025 1054  Last data filed at 4/15/2025 1000  Gross per 24 hour   Intake 1019.67 ml   Output 1578 ml   Net -558.33 ml      Physical Exam  Vitals and nursing note reviewed.   Constitutional:       General: He is not in acute distress.     Appearance: Normal appearance. He is not ill-appearing.   HENT:      Head: Normocephalic and atraumatic.   Cardiovascular:      Rate and Rhythm: Normal rate and regular rhythm.      Pulses: Normal pulses.      Heart sounds: Normal heart sounds.   Pulmonary:      Effort: Pulmonary effort is normal.      Breath sounds: Rhonchi present.   Musculoskeletal:      Right lower leg: No edema.      Left lower leg: No edema.   Skin:     General: Skin is warm and dry.      Capillary Refill: Capillary refill takes less than 2 seconds.   Neurological:      General: No focal deficit present.      Mental Status: He is alert and oriented to person, place, and time.   Psychiatric:         Mood and Affect: Mood normal.         Behavior: Behavior normal.         Thought Content: Thought content normal.         Judgment: Judgment normal.         Significant Labs: All pertinent labs within the past 24 hours have been reviewed.  CBC:   Recent Labs   Lab 04/14/25  0312 04/15/25  0310   WBC 10.17 9.26   HGB 12.7* 12.2*   HCT 39.5* 37.5*    335     CMP:   Recent Labs    Lab 04/14/25  0312 04/15/25  0310    143   K 4.1 3.4*   * 116*   CO2 20* 21*   BUN 13 10   CREATININE 0.7 0.6   CALCIUM 8.7 7.1*   ALBUMIN 2.9* 2.4*   BILITOT 0.2 0.2   ALKPHOS 62 48   AST 21 17   ALT 24 22   ANIONGAP 11 6*     Magnesium:   Recent Labs   Lab 04/14/25 0312 04/15/25  0310   MG 2.1 1.8       Significant Imaging: I have reviewed all pertinent imaging results/findings within the past 24 hours.    Medical Student Subjective & Objective

## 2025-04-15 NOTE — MEDICAL/APP STUDENT
"[facility]  Consult Note    Patient Name: Shekhar Snyder  MRN: [id number]  Admission Date: 4/11/2025  Hospital Length of Stay: 4 days  Attending Physician: Ernesto Wing MD   Primary Care Provider: Fracisco Phipps MD     Patient information was obtained from patient, parent, and ER records.     Consults: Patient was consulted for recurrent pericardial effusion, family hx of pericarditis      Subjective:   25yo M  w/ PMHx of suspected post-viral pericarditis w/ pericardial effusion s/p pericardiocentesis (4/11), mild asthma, nicotine use (quit 2-3 months ago), daily marijuana use, ADHD, and depression     2/6/25- Presented at an urgent care with cough, congestion, myalgia, and headaches and diagnosed with Influenza A. He was sick for roughly 2 weeks and unable to tolerate physical exertion even in his daily activities. Rx with Tamiflu  3/10/25-3/12/25- Presented with chest pain, dyspnea to ED and found to have acute pericarditis suspected to be post-viral; given ibuprofen 800mg three times daily and colchicine 0,6 mg twice daily  then discharged  4/11/25-now- Returned to the ED with SOB and chest pain, "felt like he was dying" for 3-4 days prior to ED presentation, but "wanted to sign a lease" for a new apartment then had a cardiology appointment. Presented with pericardial effusion with pericardial tamponade  at outpatient cardiologist then was sent for pericardiocentesis with  interventional cardiology (~ 1.6 L of serous pericardial fluid). Pt was going to be d/c'd but continues to have dyspnea and limited physical activity tolerance     Active Ambulatory Problems     Diagnosis Date Noted    Reading disorder 03/20/2013    Mild early onset dysthymic disorder, in full remission, with anxious distress, with pure dysthymic syndrome 08/27/2013    Attention deficit hyperactivity disorder (ADHD), predominantly inattentive type 06/30/2016    Acute pain of right shoulder 01/17/2017    Closed " "displaced fracture of shaft of left clavicle 09/14/2022    Pericardial effusion 03/11/2025    Chest pain 03/11/2025    Former smoker 03/11/2025    Pericarditis 03/12/2025     Resolved Ambulatory Problems     Diagnosis Date Noted    Asthma attack 11/16/2012    Bronchitis 11/16/2012    PTSD (post-traumatic stress disorder) 08/27/2013     Past Medical History:   Diagnosis Date    ADHD (attention deficit hyperactivity disorder)     Depression       Family Hx:  Mother- recurrent pericardial effusions, fibromyalgia    Objective:  Vital Signs  Temp: 98.3 °F (36.8 °C)  Temp Source: Oral  Pulse: 81  Heart Rate Source: Monitor, Continuous  Resp: 19  SpO2: 96 %  Pulse Oximetry Type: Continuous  Flow (L/min) (Oxygen Therapy): 4  Oxygen Concentration (%): 32  Device (Oxygen Therapy): nasal cannula with humidification  BP: 120/71  BP Location: Right arm  BP Method: Automatic  Patient Position: Lying  Height and Weight  Height: 6' 3" (190.5 cm)  Weight: 77 kg (169 lb 12.1 oz)  Weight Method: Bed Scale  BSA (Calculated - sq m): 2.06 sq meters  BMI (Calculated): 21.2  Weight in (lb) to have BMI = 25: 199.6]    Review of Systems   Constitutional:  Positive for diaphoresis.   Eyes:  Positive for photophobia.        Photophobia to bright lights has been occurring for years   Respiratory:  Positive for cough and sputum production. Negative for hemoptysis and wheezing.         Pleuritic chest pain on lower left side   Gastrointestinal:  Negative for abdominal pain, constipation, diarrhea, nausea and vomiting.   Genitourinary:  Negative for dysuria.   Skin:  Negative for itching and rash.   Neurological:  Negative for headaches.      Physical Exam  Vitals reviewed.   Constitutional:       General: He is not in acute distress.     Appearance: Normal appearance. He is not toxic-appearing.   Cardiovascular:      Rate and Rhythm: Normal rate and regular rhythm.      Heart sounds: Normal heart sounds.   Pulmonary:      Effort: Pulmonary effort " is normal.      Breath sounds: Normal breath sounds. No wheezing.   Chest:      Comments: Pain around drain site from pericardiocentesis 4/11  Abdominal:      General: Bowel sounds are normal. There is no distension.      Palpations: Abdomen is soft.      Tenderness: There is no abdominal tenderness.   Musculoskeletal:         General: No swelling, tenderness or deformity.      Right shoulder: Normal.      Left shoulder: Normal.      Cervical back: Normal range of motion.   Skin:     General: Skin is warm.      Capillary Refill: Capillary refill takes less than 2 seconds.      Findings: No erythema, signs of injury or rash.   Neurological:      Mental Status: He is alert and oriented to person, place, and time. Mental status is at baseline.        Assessment/Plan:     Pericarditis/ recurrent pericardial effusions with tamponade physiology   Improved after pericardiocentesis, will need to evaluate to monitor for recurrence/ worsening of symptoms. Pt initially improved, but symptoms have not resolved.  Pt denies any other current symptoms that would be concerning for a systemic rheumatologic process  -continue ibuprofen 800mg three times daily  and colchicine 0.6 mg twice daily for acute pericarditis management  -Will review and complete any other lab work to differentiate etiology vs post-viral    Active Diagnoses:    Diagnosis Date Noted POA    PRINCIPAL PROBLEM:  Pericardial effusion [I31.39] 03/11/2025 Yes    Pleural effusion [J90] 04/15/2025 No    Pericarditis [I31.9] 03/12/2025 Yes      Problems Resolved During this Admission:     VTE Risk Mitigation (From admission, onward)           Ordered     IP VTE HIGH RISK PATIENT  Once         04/11/25 2151     Place sequential compression device  Until discontinued         04/11/25 2151                   Latest Reference Range & Units 03/11/25 12:43 04/11/25 14:12   Sed Rate <=23 mm/hr 28 (H) 16   (H): Data is abnormally high   Latest Reference Range & Units 04/11/25  18:41   CRP <=8.2 mg/L 146.0 (H)   (H): Data is abnormally high  Thank you for your consult.  Assessment and plan will be discussed further on rounds with fellow, Dr. Romeo and supervising attending, Dr. Jaida Livingston, MS4    Acute pericarditis onset 2-4 wks after influenza A  S/p pericardial tamponade  DOM + 1:80 nucleolar   Anemia  Hypoalbuminemia  + mother with recurrent pericarditis    CK, aldolase Scl-70, RNAP III, centromere, Th/To C3 C4 ANCA PR3 MPO IgG4, immunoglobulins anemia labs Myomarker panel RF ACPA  UPCR  Continue ibuprofen 800mg three times daily(or naproxen 500mg twice daily or celecoxib 200mg daily and colchicine 0.6 mg twice daily. If no continued improvement, trial of anakinra 100mg sc daily  E-Consult Genetics consult given + family history  NGS sequencing  to analyze favio forTNFRSFIA(TRAPS), MEFV(FMF) and IL B  as recurrent acute pericarditis felt to be auto-inflammatory syndrome and as many as 15% of  cases have genetic risk factor and with his family history, likelihood higher    Philip Sena MD  Rheumatology  [phone]

## 2025-04-15 NOTE — ASSESSMENT & PLAN NOTE
Patient found to have new pleural effusion and/or consolidation change in LLL noted on bedside echo and CXR done after removal of pericardial drain. Pt was preparing to discharge when O2 saturations dropped. Could be related to inflammatory process from pericarditis.   Pulm consulted - thora vs diuresis

## 2025-04-15 NOTE — ASSESSMENT & PLAN NOTE
Patient found to have new pleural effusion and/or consolidation change in LLL noted on bedside echo and CXR done after removal of pericardial drain. Pt was preparing to discharge when O2 saturations dropped. Could be related to inflammatory process from pericarditis.   Pulm consulted - no heydi at this moment  CT chest  Rheum consult for eval of possible autoimmune process

## 2025-04-15 NOTE — EICU
Intervention Initiated From:  COR / NARAYANU    Nilda intervened regarding:  Rounding (Video assessment)  VICU Night Rounds Checklist  24H Vital Sign Range:  Temp:  [97.8 °F (36.6 °C)-98.2 °F (36.8 °C)]   Pulse:  []   Resp:  [14-39]   BP: ()/(54-91)   SpO2:  [92 %-100 %]     Video rounds and LDA reconciliation

## 2025-04-15 NOTE — NURSING
SICU PLAN OF CARE    Dx: Pericardial effusion    Goals of Care: Monitor SOB and SpO2; wean supplemental O2 as tolerated.     Shift Events:  NAEON. Transfer orders remain in place, awaiting bed on CSU.    See flowsheet for further assessment/details.  Family updated on current condition/plan of care, questions answered, and emotional support provided.  MD updated on current condition, vitals, labs, and gtts.    Vital Signs (last 12 hours):   Temp:  [98.1 °F (36.7 °C)-98.2 °F (36.8 °C)]   Pulse:  []   Resp:  [14-39]   BP: ()/(54-91)   SpO2:  [92 %-100 %]      Neuro: AAOx4, Follows commands , and Moves all extremities spontaneously     Cardiac: normal sinus rhythm    Respiratory:  4L Nasal Cannula     Urine Output: Voids Spontaneously  575 mL/shift    Diet: Cardiac      Labs/Accuchecks: Daily accuchecks and labs.     Skin:  No new skin events noted during shift.  Patient turned q2h, bony prominences protected, and mattress inflated/working correctly.

## 2025-04-15 NOTE — SUBJECTIVE & OBJECTIVE
Interval History:   No acute changes overnight or this morning.    Past Medical History:   Diagnosis Date    ADHD (attention deficit hyperactivity disorder)     Depression     PTSD (post-traumatic stress disorder)        Past Surgical History:   Procedure Laterality Date    ADENOIDECTOMY      CIRCUMCISION      OPEN REDUCTION AND INTERNAL FIXATION (ORIF) OF FRACTURE OF CLAVICLE Left 9/20/2022    Procedure: ORIF, FRACTURE, CLAVICLE;  Surgeon: Satya Reynoso IV, MD;  Location: [facility] OR;  Service: Orthopedics;  Laterality: Left;  Beach chair, padded darling stand (no arm krishnamurthy needed), Large C arm come in from head of bed; acumed clavicle system  dian notified 9/19 @ [facility]    PERICARDIOCENTESIS N/A 4/11/2025    Procedure: Pericardiocentesis;  Surgeon: Ismael Laguna Jr., MD;  Location: [facility] CATH LAB;  Service: Cardiology;  Laterality: N/A;    TONSILLECTOMY      TYMPANOSTOMY TUBE PLACEMENT         Immunization History   Administered Date(s) Administered    DTaP 09/13/2002, 04/15/2003, 07/22/2009, 04/01/2014    DTaP (5 Pertussis Antigens) 2001    DTaP / Hep B / IPV 11/29/2006, 05/10/2007, 06/25/2008    HPV 9-Valent 02/08/2019    HPV Quadrivalent 04/01/2014    Hep B / HiB 2001, 02/15/2002, 01/27/2003    Hepatitis A, Pediatric/Adolescent, 2 Dose 07/22/2009, 05/09/2012    HiB PRP-T 06/25/2008, 07/22/2009    Hib-HbOC 11/29/2006, 05/10/2007    IPV 2001, 02/15/2002, 04/15/2003, 07/22/2009, 04/01/2014    Influenza 11/07/2005, 11/18/2008    Influenza - Intranasal - Trivalent 11/08/2007    Influenza A (H1N1) 2009 Monovalent - IM 11/03/2009    Influenza Split 11/08/2007, 11/18/2008    MMR 01/27/2003, 06/25/2008, 07/22/2009, 04/01/2014    Meningococcal Conjugate 04/01/2014    Meningococcal Conjugate (MCV4P) 04/01/2014, 02/08/2019    Pneumococcal Conjugate - 7 Valent 02/15/2002, 04/15/2003, 11/29/2006, 05/10/2007, 06/25/2008, 07/22/2009    Rotavirus Pentavalent 11/29/2006    Tdap 04/01/2014    Varicella  "10/18/2002, 03/09/2007, 06/25/2008, 04/01/2014       Review of patient's allergies indicates:   Allergen Reactions    Omnicef [cefdinir] Edema     Current Facility-Administered Medications   Medication Frequency    acetaminophen tablet 650 mg Q4H PRN    albuterol inhaler 2 puff Q4H PRN    albuterol sulfate nebulizer solution 2.5 mg Q8H    ampicillin-sulbactam (UNASYN) 3 g in 0.9% NaCl 100 mL IVPB (MB+) Q6H    colchicine capsule/tablet 0.6 mg BID    fluticasone furoate-vilanteroL 200-25 mcg/dose diskus inhaler 1 puff Daily    ibuprofen tablet 800 mg Q8H    LIDOcaine 5 % patch 1 patch Q24H    ondansetron disintegrating tablet 8 mg Q8H PRN    pantoprazole EC tablet 40 mg Daily    sodium chloride 0.9% flush 10 mL PRN     Family History       Problem Relation (Age of Onset)    ADD / ADHD Mother    Alcohol abuse Father    Asthma Brother    Bipolar disorder Maternal Uncle    Depression Mother    Drug abuse Father    Personality disorder Father    Physical abuse Paternal Grandmother    Oswald Brock sequence Brother          Tobacco Use    Smoking status: Every Day     Current packs/day: 0.50     Average packs/day: 0.5 packs/day for 5.3 years (2.6 ttl pk-yrs)     Types: Cigarettes, Vaping with nicotine     Start date: 2020     Passive exposure: Never    Smokeless tobacco: Never    Tobacco comments:     Pt states that he started smoking cigarettes and vaping at age 19, but then quit using nicotine in any form 1 month ago when he started feeling ill.  EMR to be updated to reflect "Former Smoker" status when pt remains without relapse as of 2/12//2026.   Substance and Sexual Activity    Alcohol use: No    Drug use: Yes     Types: Marijuana    Sexual activity: Never     Objective:     Vital Signs (Most Recent):  Temp: 99.5 °F (37.5 °C) (04/17/25 0727)  Pulse: 105 (04/17/25 0802)  Resp: 18 (04/17/25 0802)  BP: 118/65 (04/17/25 0727)  SpO2: (!) 94 % (04/17/25 0802) Vital Signs (24h Range):  Temp:  [98 °F (36.7 °C)-99.5 °F (37.5 " °C)] 99.5 °F (37.5 °C)  Pulse:  [] 105  Resp:  [11-27] 18  SpO2:  [93 %-97 %] 94 %  BP: ()/(54-72) 118/65     Weight: 77 kg (169 lb 12.1 oz) (04/15/25 0424)  Body mass index is 21.22 kg/m².  Body surface area is 2.02 meters squared.      Intake/Output Summary (Last 24 hours) at 4/17/2025 0914  Last data filed at 4/16/2025 1636  Gross per 24 hour   Intake 360 ml   Output 400 ml   Net -40 ml         Physical Exam   Constitutional: He is oriented to person, place, and time. He appears well-developed and well-nourished. No distress.   HENT:   Head: Normocephalic and atraumatic.   Right Ear: External ear normal.   Left Ear: External ear normal.   Nose: Nose normal. No nasal congestion.   Mouth/Throat: Oropharynx is clear and moist. Mucous membranes are moist. Oropharynx is clear.   Eyes: Conjunctivae are normal.   Cardiovascular: Normal rate and regular rhythm.   Pulmonary/Chest: Effort normal. No respiratory distress. He has no wheezes.   Abdominal: Soft. He exhibits no distension. There is no abdominal tenderness.   Musculoskeletal:         General: No swelling or tenderness. Normal range of motion.      Cervical back: Normal range of motion and neck supple.      Comments: No acute synovitis in any of the small or large joints.   Neurological: He is alert and oriented to person, place, and time.   Skin: Skin is warm and dry. No lesion and no rash noted.   Psychiatric: His behavior is normal. Mood normal.   Vitals reviewed.       Significant Labs:  All pertinent lab results from the last 24 hours have been reviewed.    Significant Imaging:  Imaging results within the past 24 hours have been reviewed.

## 2025-04-15 NOTE — CONSULTS
[facility] - [facility] Intensive Care  Rheumatology  Consult Note    Patient Name: Shekhar Snyder  MRN: [id number]  Admission Date: 4/11/2025  Hospital Length of Stay: 4 days  Code Status: Full Code   Attending Provider: Ernesto Wing MD  Primary Care Physician: Fracisco Phipps MD  Principal Problem:Pericardial effusion    Inpatient consult to Rheumatology  Consult performed by: Navya Romeo MD  Consult ordered by: Rowena Marquez DO        Subjective:     HPI: Shekhar Snyder is a 23yo M with PMH of post viral/Influenza A pericarditis, mild intermittent asthma, daily marijuana use, prior tobacco/nicotine use (quit 2-3 mos ago), ADHD, PTSD, depression.    Recent Pertinent History:  - 2/6/25: Seen in urgent care and dx with Influenza A - with symptoms of headaches, myalgia, nausea, vomiting, diarrhea, cough, congestion, MCCALLUM x 3-5 days  - 3/10-12/25: Admitted with complaints of mid sternal chest pain/tightness, dyspnea, lightheadedness x 4 days, symptoms worse with lying back, leaning forward, exertion, or deep inhalation - managed for suspected post viral pericarditis, with NSAIDs and colchicine  - Echo (3/11/25) with moderate pericardial effusion without tamponade  - Developed GI side effects with high dose ibuprofen, so decreased and stopped it eventually  - Symptoms never completely resolved but had improved in the interim    Current Hospitalization (4/11/25-current):  - Seen by outpt cardiology on 4/11 and found to have persistent vs recurrent large effusion, now with tamponade  - Therefore admitted to hospital for acute pericarditis/effusion/tamponade management  - 4/11: taken to cath lab for pericardiocentesis, s/p removal of 1500cc serous fluid  - Restarted high dose NSAIDs (ibuprofen 800mg Q8h) and colchicine 0.6mg BID  - Pt was going to be d/c'd on 4/14 but got hypoxic and found to have pleural effusion on US and CXR, so stayed in hospital  - Pulmonology evaluated, no indication for thoracentesis at  "this time    Rheumatology was consulted for "Recurrent pericarditis, pleural effusion, and family hx of pericarditis. Concerns for autoimmune process? Connective tissue disorder?"    On initial evaluation, pt endorses the above hx. History is obtained from pt and his mother at bedside. Continues to have some dyspnea, cough, and chest tightness, O2 sats drop with any exertion. Denies any current or recent h/o fevers, chills, other infectious/sick contacts (other than prior flu in February), nausea, vomiting, GI changes, urinary changes, joint pain, joint swelling, joint stiffness (other that occasional mild ankle stiffness), skin rash/changes, oral/nasal ulcers, inflammatory eye problems, alopecia. Pt endorses some light sensitivity in his eyes.     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, daily marijuana use, prior tobacco/nicotine use, occasional social alcohol but not frequent, no other drug use    Past Medical History:   Diagnosis Date    ADHD (attention deficit hyperactivity disorder)     Depression     PTSD (post-traumatic stress disorder)        Past Surgical History:   Procedure Laterality Date    ADENOIDECTOMY      CIRCUMCISION      OPEN REDUCTION AND INTERNAL FIXATION (ORIF) OF FRACTURE OF CLAVICLE Left 9/20/2022    Procedure: ORIF, FRACTURE, CLAVICLE;  Surgeon: Satya Reynoso IV, MD;  Location: [facility] OR;  Service: Orthopedics;  Laterality: Left;  Beach chair, padded darling stand (no arm krishnamurthy needed), Large C arm come in from head of bed; acumed clavicle system  dian notified 9/19 @ [facility]    PERICARDIOCENTESIS N/A 4/11/2025    Procedure: Pericardiocentesis;  Surgeon: Ismael Laguna Jr., MD;  Location: [facility] CATH LAB;  Service: Cardiology;  Laterality: N/A;    TONSILLECTOMY      TYMPANOSTOMY TUBE PLACEMENT         Immunization History "   Administered Date(s) Administered    DTaP 09/13/2002, 04/15/2003, 07/22/2009, 04/01/2014    DTaP (5 Pertussis Antigens) 2001    DTaP / Hep B / IPV 11/29/2006, 05/10/2007, 06/25/2008    HPV 9-Valent 02/08/2019    HPV Quadrivalent 04/01/2014    Hep B / HiB 2001, 02/15/2002, 01/27/2003    Hepatitis A, Pediatric/Adolescent, 2 Dose 07/22/2009, 05/09/2012    HiB PRP-T 06/25/2008, 07/22/2009    Hib-HbOC 11/29/2006, 05/10/2007    IPV 2001, 02/15/2002, 04/15/2003, 07/22/2009, 04/01/2014    Influenza 11/07/2005, 11/18/2008    Influenza - Intranasal - Trivalent 11/08/2007    Influenza A (H1N1) 2009 Monovalent - IM 11/03/2009    Influenza Split 11/08/2007, 11/18/2008    MMR 01/27/2003, 06/25/2008, 07/22/2009, 04/01/2014    Meningococcal Conjugate 04/01/2014    Meningococcal Conjugate (MCV4P) 04/01/2014, 02/08/2019    Pneumococcal Conjugate - 7 Valent 02/15/2002, 04/15/2003, 11/29/2006, 05/10/2007, 06/25/2008, 07/22/2009    Rotavirus Pentavalent 11/29/2006    Tdap 04/01/2014    Varicella 10/18/2002, 03/09/2007, 06/25/2008, 04/01/2014       Review of patient's allergies indicates:   Allergen Reactions    Omnicef [cefdinir] Edema     Current Facility-Administered Medications   Medication Frequency    acetaminophen tablet 650 mg Q4H PRN    albuterol inhaler 2 puff Q4H PRN    albuterol sulfate nebulizer solution 2.5 mg Q8H    colchicine capsule/tablet 0.6 mg BID    ibuprofen tablet 800 mg Q8H    ondansetron disintegrating tablet 8 mg Q8H PRN    pantoprazole EC tablet 40 mg Daily    sodium chloride 0.9% flush 10 mL PRN     Family History       Problem Relation (Age of Onset)    ADD / ADHD Mother    Alcohol abuse Father    Asthma Brother    Bipolar disorder Maternal Uncle    Depression Mother    Drug abuse Father    Personality disorder Father    Physical abuse Paternal Grandmother    Oswald Brock sequence Brother          Tobacco Use    Smoking status: Every Day     Current packs/day: 0.50     Average packs/day:  "0.5 packs/day for 5.3 years (2.6 ttl pk-yrs)     Types: Cigarettes, Vaping with nicotine     Start date: 2020     Passive exposure: Never    Smokeless tobacco: Never    Tobacco comments:     Pt states that he started smoking cigarettes and vaping at age 19, but then quit using nicotine in any form 1 month ago when he started feeling ill.  EMR to be updated to reflect "Former Smoker" status when pt remains without relapse as of 2/12//2026.   Substance and Sexual Activity    Alcohol use: No    Drug use: Yes     Types: Marijuana    Sexual activity: Never     Objective:     Vital Signs (Most Recent):  Temp: 98.3 °F (36.8 °C) (04/15/25 1000)  Pulse: 81 (04/15/25 1200)  Resp: 19 (04/15/25 1200)  BP: 120/71 (04/15/25 1200)  SpO2: 96 % (04/15/25 1200) Vital Signs (24h Range):  Temp:  [97 °F (36.1 °C)-98.3 °F (36.8 °C)] 98.3 °F (36.8 °C)  Pulse:  [] 81  Resp:  [14-39] 19  SpO2:  [92 %-100 %] 96 %  BP: ()/(54-91) 120/71     Weight: 77 kg (169 lb 12.1 oz) (04/15/25 0424)  Body mass index is 21.22 kg/m².  Body surface area is 2.02 meters squared.      Intake/Output Summary (Last 24 hours) at 4/15/2025 1216  Last data filed at 4/15/2025 1000  Gross per 24 hour   Intake 779.67 ml   Output 1575 ml   Net -795.33 ml         Physical Exam   Constitutional: He is oriented to person, place, and time. He appears well-developed and well-nourished. No distress.   HENT:   Head: Normocephalic and atraumatic.   Right Ear: External ear normal.   Left Ear: External ear normal.   Nose: Nose normal. No nasal congestion.   Mouth/Throat: Oropharynx is clear and moist. Mucous membranes are moist. Oropharynx is clear.   Eyes: Conjunctivae are normal.   Cardiovascular: Normal rate and regular rhythm.   Pulmonary/Chest: Effort normal. No respiratory distress. He has no wheezes.   Abdominal: Soft. He exhibits no distension. There is no abdominal tenderness.   Musculoskeletal:         General: No swelling or tenderness. Normal range of " motion.      Cervical back: Normal range of motion and neck supple.      Comments: No acute synovitis in any of the small or large joints.   Neurological: He is alert and oriented to person, place, and time.   Skin: Skin is warm and dry. No lesion and no rash noted.   Psychiatric: His behavior is normal. Mood normal.   Vitals reviewed.       Significant Labs:  All pertinent lab results from the last 24 hours have been reviewed.    Significant Imaging:  Imaging results within the past 24 hours have been reviewed.  Assessment/Plan:     Cardiac/Vascular  Pericarditis  Shekhar Snyder is a 23yo M with PMH of post viral/Influenza A pericarditis, mild intermittent asthma, daily marijuana use, prior tobacco/nicotine use (quit 2-3 mos ago), ADHD, PTSD, depression.    - Pt with acute pericarditis (initially dx 3/2025), suspected to be post viral after Influenza A infection 1 month prior to initial episode  - Pt did not complete full course/tx with high dose NSAIDs and colchicine  - Then found to have large pericardial effusion w/ tamponade requiring pericardiocentesis now (4/11/25)  - Symptoms initially improved, but have not resolved - with persistent chest pain/tightness, dyspnea, and cough  - Now with pleural effusion as well  - Pt with no other current symptoms indicative of a systemic autoimmune rheumatologic process  - Labs showed negative DOM/EMERY profile (DOM was only 1:80)    Plan/Recommendations:  - Agree with acute pericarditis management per cardio with high dose NSAIDs and colchicine  - If symptoms do not resolve and/or pt does develop recurrent episode despite full adequate tx with NSAIDs/colchicine - cardio could consider use of IL-1 inhibitor  - Will review and complete other autoimmune lab workup as appropriate  - Consider obtaining autoinflammatory gene panel especially with known family hx of recurrent pericarditis - would recommend doing an outpatient referral or e-consult to genetics      Thank you for your  consult. I will follow-up with patient. Please contact us if you have any additional questions.    Assessment and plan will be discussed further on rounds with supervising attending, Dr. Sena. Please do not hesitate to reach out with any questions or concerns.      Navya Romeo MD  PGY-5, Rheumatology    Acute pericarditis onset 2-4 wks after influenza A  S/p pericardial tamponade  DOM + 1:80 nucleolar   Anemia  Hypoalbuminemia  + mother with recurrent pericarditis     CK, aldolase Scl-70, RNAP III, centromere, Th/To C3 C4 ANCA PR3 MPO IgG4, immunoglobulins anemia labs Myomarker panel RF ACPA  UPCR  Continue ibuprofen 800mg three times daily(or naproxen 500mg twice daily or celecoxib 200mg daily and colchicine 0.6 mg twice daily. If no continued improvement, trial of anakinra 100mg sc daily  E-Consult Genetics consult given + family history  NGS sequencing  to analyze favio forTNFRSFIA(TRAPS), MEFV(FMF) and IL B  as recurrent acute pericarditis felt to be auto-inflammatory syndrome and as many as 15% of  cases have genetic risk factor and with his family history, likelihood higher     Philip Sena MD  Rheumatology  [phone]

## 2025-04-15 NOTE — PROGRESS NOTES
[facility] - [facility] Intensive Care  Cardiology  Progress Note    Patient Name: Shekhar Snyder  MRN: [id number]  Admission Date: 4/11/2025  Hospital Length of Stay: 4 days  Code Status: Full Code   Attending Physician: Ernesto Wing MD   Primary Care Physician: Fracisco Phipps MD  Expected Discharge Date: 4/16/2025  Principal Problem:Pericardial effusion    Subjective:     Hospital Course:   Admitted for pericarditis and pericardial effusion with tamponade physiology. 1.6L of pericardial fluid were drained by interventional cardiology. Started on ibuprofen and colchicine. Drainage improved with eventual removal of drain. Some pleural effusions were noted, however, O2 saturations remained stable. CXR ordered prior to discharge revealed improvement in pericardial effusion and new pleural reaction atelectasis and/or consolidation change in left lower lobe.     Patient was medically cleared for discharge, however, prior to discharge patient became hypoxic, CXR was done which showed pleural effusion/atelectasis and was not discharged. Pulmonology was consulted, deferred thoracentesis, recommended CT chest. Rheumatology was also consulted for evaluation of possible autoimmune processes.     Interval History: Pt became hypoxic yesterday prior to discharge, consequently, was not discharged. Patient reports feeling somewhat improved, still reports chest discomfort and difficulty breathing.     Review of Systems   Constitutional: Negative for chills and fever.   Cardiovascular:  Positive for chest pain and dyspnea on exertion. Negative for orthopnea and palpitations.   Respiratory:  Positive for cough and shortness of breath. Negative for hemoptysis.    Musculoskeletal:  Positive for joint pain.   Gastrointestinal:  Negative for abdominal pain, change in bowel habit, constipation and diarrhea.     Objective:     Vital Signs (Most Recent):  Temp: 97 °F (36.1 °C) (04/15/25 0730)  Pulse: 93 (04/15/25 0800)  Resp: 17  (04/15/25 0800)  BP: 115/71 (04/15/25 0800)  SpO2: 95 % (04/15/25 0800) Vital Signs (24h Range):  Temp:  [97 °F (36.1 °C)-98.2 °F (36.8 °C)] 97 °F (36.1 °C)  Pulse:  [] 93  Resp:  [14-39] 17  SpO2:  [92 %-100 %] 95 %  BP: ()/(54-91) 115/71     Weight: 77 kg (169 lb 12.1 oz)  Body mass index is 21.22 kg/m².     SpO2: 95 %         Intake/Output Summary (Last 24 hours) at 4/15/2025 0901  Last data filed at 4/15/2025 0628  Gross per 24 hour   Intake 529.67 ml   Output 1178 ml   Net -648.33 ml       Lines/Drains/Airways       Peripheral Intravenous Line  Duration                  Peripheral IV - Single Lumen 04/14/25 1441 20 G Left Antecubital <1 day                       Physical Exam  Vitals and nursing note reviewed.   Constitutional:       General: He is not in acute distress.     Appearance: Normal appearance. He is not ill-appearing.   HENT:      Head: Normocephalic and atraumatic.   Cardiovascular:      Rate and Rhythm: Normal rate and regular rhythm.   Pulmonary:      Effort: Pulmonary effort is normal. No respiratory distress.      Breath sounds: Normal breath sounds. No wheezing.   Chest:      Comments: Drain removed.  Skin:     General: Skin is warm and dry.   Neurological:      General: No focal deficit present.      Mental Status: He is oriented to person, place, and time.            Significant Labs: All pertinent lab results from the last 24 hours have been reviewed.    Significant Imaging:  X-Ray Chest AP Portable  Narrative: EXAMINATION:  XR CHEST AP PORTABLE    CLINICAL HISTORY:  Pleural effusion;    TECHNIQUE:  Single frontal view of the chest was performed.    COMPARISON:  04/11/2025, CTA chest 03/10/2025    FINDINGS:  Clearing of severe water bottle configuration cardiomegaly, remaining mild cardiomegaly, status post suspected drainage of pericardial effusion.  New pleural effusion, consolidation, atelectasis left lower lobe, obscuring dome left hemidiaphragm in the interim.  New linea  atelectasis basilar segment right lower lobe.  Postop ORIF therapy left clavicle, stable.  Impression: Status post suspected pericardial effusion removal.    New pleural reaction atelectasis and/or consolidation change left lower lobe, new discoid atelectasis right lung base appearing in the interim.    Electronically signed by: Joshua Iniguez MD  Date:    04/14/2025  Time:    14:21      Assessment and Plan:     * Pericardial effusion  Symptomatic with pain and SOB, elevations in CRP. EKG unremarkable. Hemodynamics stable, however, echo showed massive effusion, CVP 15, RV diastolic collapse and early cardiac tamponade, consequently, underwent STAT pericardiocentesis and 1.6 L fluid removed with drain left in place.   F/u studies  Continue colchicine and ibuprofen         Pleural effusion  Patient found to have new pleural effusion and/or consolidation change in LLL noted on bedside echo and CXR done after removal of pericardial drain. Pt was preparing to discharge when O2 saturations dropped. Could be related to inflammatory process from pericarditis.   Pulm consulted - no stefania at this moment  CT chest  Rheum consult for eval of possible autoimmune process      Pericarditis  Sent to hospital from outpatient cardiology follow up for initial episode of pericarditis from March. Etiology unknown, thought to be 2/2 to influenza infection from February. Incomplete adherence to ibuprofen and colchicine 2/2 GI side effects. Unclear if current presentation is recurrent pericarditis vs incompletely treated primary episode.    Continue ibuprofen and colchicine  PPI  If no resolution of symptoms, can consider steroid course        VTE Risk Mitigation (From admission, onward)           Ordered     IP VTE HIGH RISK PATIENT  Once         04/11/25 2151     Place sequential compression device  Until discontinued         04/11/25 2151                    Rowena Marquez DO  Cardiology  [facility] - Surgical Intensive Care

## 2025-04-15 NOTE — CONSULTS
Pulmonary & Critical Care Medicine Consult Note    Primary Attending Physician: Dr. Maral Wing  Consultant Attending: Dr. Elroy Dow  Consultant Fellow: Dr. Benny Welch    Reason for Consult:     Right sided pleural effusion    Subjective:      History of Present Illness:  Shekhar Snyder is a 24 y.o.  male who  has a past medical history of ADHD (attention deficit hyperactivity disorder), Depression, and PTSD (post-traumatic stress disorder).. The patient presented to the [facility] on 4/11/2025 with a primary complaint of Chest Pain (Pt has diagnosed pericarditis and was seen by cardiologist today with imaging. Pt was advised to come to the ER.)  Patient was treated for pericarditis and was about to be discharged on 4/14/25 but developed worsening SOB and O2 desaturation. Repeat CXR showing small pleural effusion. Pulmonology team consulted for evaluation.     Patient seen and examined at bedside. Patient sitting up in chair comfortably in no acute distress. Mother at bedside. Patient states that his SOB is controlled at res but whenever he gets up to walk he gets worsening SOB. Mother states she had similar issues in the past with pericarditis and pleural effusions. Patient denies any headaches, fever, chills, chest pain, abd pain, nausea, vomiting, diarrhea, constipation, night sweat, hemoptysis, or unusual weight loss. Patient states he has never been worked up for autoimmune or connective tissue diseases.     Past Medical History:  Past Medical History:   Diagnosis Date    ADHD (attention deficit hyperactivity disorder)     Depression     PTSD (post-traumatic stress disorder)        Past Surgical History:  Past Surgical History:   Procedure Laterality Date    ADENOIDECTOMY      CIRCUMCISION      OPEN REDUCTION AND INTERNAL FIXATION (ORIF) OF FRACTURE OF CLAVICLE Left 9/20/2022    Procedure: ORIF, FRACTURE, CLAVICLE;  Surgeon: Satya Reynoso IV, MD;  Location: [facility] OR;  Service:  Orthopedics;  Laterality: Left;  Beach chair, padded darling stand (no arm krishnamurthy needed), Large C arm come in from head of bed; acumed clavicle system  zack notified 9/19 @ [facility]    PERICARDIOCENTESIS N/A 4/11/2025    Procedure: Pericardiocentesis;  Surgeon: Ismael Laguna Jr., MD;  Location: [facility] CATH LAB;  Service: Cardiology;  Laterality: N/A;    TONSILLECTOMY      TYMPANOSTOMY TUBE PLACEMENT         Allergies:  Review of patient's allergies indicates:   Allergen Reactions    Omnicef [cefdinir] Edema       Medications:   In-Hospital Scheduled Medications:   albuterol sulfate  2.5 mg Nebulization Q8H    colchicine  0.6 mg Oral BID    ibuprofen  800 mg Oral Q8H    pantoprazole  40 mg Oral Daily      In-Hospital PRN Medications:    Current Facility-Administered Medications:     acetaminophen, 650 mg, Oral, Q4H PRN    albuterol, 2 puff, Inhalation, Q4H PRN    ondansetron, 8 mg, Oral, Q8H PRN    sodium chloride 0.9%, 10 mL, Intravenous, PRN   In-Hospital IV Infusion Medications:     Home Medications:  Prior to Admission medications    Medication Sig Start Date End Date Taking? Authorizing Provider   albuterol (VENTOLIN HFA) 90 mcg/actuation inhaler Inhale 2 puffs into the lungs every 6 (six) hours as needed for Wheezing. Rescue   Yes Provider, Historical   colchicine (COLCRYS) 0.6 mg tablet Take 1 tablet (0.6 mg total) by mouth 2 (two) times daily. 4/11/25 4/11/26 Yes Jonathon Kelly MD   pantoprazole (PROTONIX) 40 MG tablet Take 1 tablet (40 mg total) by mouth once daily. 4/11/25 4/11/26 Yes Jonathon Kelly MD   ibuprofen (ADVIL,MOTRIN) 200 MG tablet Take 4 tablets (800 mg total) by mouth every 8 (eight) hours for 14 days, THEN 3 tablets (600 mg total) every 8 (eight) hours for 7 days, THEN 2 tablets (400 mg total) every 8 (eight) hours for 7 days, THEN 1 tablet (200 mg total) every 8 (eight) hours for 7 days. 4/14/25 5/19/25  Ernesto Wing MD       Family History:  Family History   Problem Relation Name Age  of Onset    Oswald Vinod sequence Brother Shekhar morillo     Asthma Brother Shekhar morillo     Bipolar disorder Maternal Uncle      Alcohol abuse Father      Drug abuse Father      Personality disorder Father      ADD / ADHD Mother      Depression Mother      Physical abuse Paternal Grandmother         Social History:  Social History[1]    Review of Systems:  Pertinent items are noted in HPI. All other systems are reviewed and are negative.     Objective:   Last 24 Hour Vital Signs:  BP  Min: 98/58  Max: 132/88  Temp  Av.9 °F (36.6 °C)  Min: 97 °F (36.1 °C)  Max: 98.2 °F (36.8 °C)  Pulse  Av.5  Min: 59  Max: 106  Resp  Av.3  Min: 14  Max: 39  SpO2  Av.3 %  Min: 92 %  Max: 100 %  Weight  Av kg (169 lb 12.1 oz)  Min: 77 kg (169 lb 12.1 oz)  Max: 77 kg (169 lb 12.1 oz)  I/O last 3 completed shifts:  In: 529.7 [P.O.:480; I.V.:49.7]  Out: 1784 [Urine:1775; Other:9]    Physical Examination:  GEN: Patient A&O X 3, well-developed, NAD.  HEENT:  -Head: NC/AT;  -Eyes: No discharge or redness;  -Ears: External ears are normal.  -Nose: Normal nares.  -Mouth and throat: MMM.  CV: RRR, no m/r/g.  LUNGS:Crackles at bilateral bases and wheezing noted   ABD: Soft, NT/ND, NBS, no masses or organomegaly.  SKIN: Warm, well perfused. No skin rashes or abnormal lesions.  MSK: Normal gait. No deformities.  EXT: No clubbing, cyanosis, or edema.  NEURO: No focal deficits  noted      Laboratory:  Trended Lab Data:  Recent Labs     25  0525 25  0312 04/15/25  0310   WBC 7.48 10.17 9.26   HGB 12.1* 12.7* 12.2*   HCT 37.3* 39.5* 37.5*    392 335    142 143   K 3.8 4.1 3.4*    111* 116*   CO2 22* 20* 21*   BUN 12 13 10   CREATININE 0.6 0.7 0.6   BILITOT 0.3 0.2 0.2   AST 17 21 17   ALT 27 24 22   ALKPHOS 58 62 48   CALCIUM 8.4* 8.7 7.1*   ALBUMIN 2.7* 2.9* 2.4*   MG 2.0 2.1 1.8       Cardiac:   Recent Labs   Lab 25  1841   BNP 74       Urinalysis:   Lab Results   Component  Value Date    COLORU Yellow 04/01/2014    SPECGRAV 1.005 04/01/2014    NITRITE Negative 04/01/2014    KETONESU Negative 04/01/2014    UROBILINOGEN Negative 04/01/2014       Microbiology:  Microbiology Results (last 7 days)       Procedure Component Value Units Date/Time    Aerobic culture [[id number]] Collected: 04/11/25 2045    Order Status: Completed Specimen: Pericardial fluid Updated: 04/15/25 0649     CULTURE, AEROBIC No Growth    Afb Culture Stain [[id number]] Collected: 04/11/25 2045    Order Status: Completed Specimen: Pericardial fluid Updated: 04/14/25 1449     ACID FAST STAIN  No acid fast bacilli seen    Culture, Anaerobe [[id number]] Collected: 04/11/25 2045    Order Status: Completed Specimen: Pericardial fluid Updated: 04/13/25 1042     Anaerobe Culture Culture In Progress    Gram stain [[id number]] Collected: 04/11/25 2045    Order Status: Completed Specimen: Pericardial fluid Updated: 04/12/25 1122     GRAM STAIN No WBCs      No organisms seen    AFB Culture & Smear [[id number]] Collected: 04/11/25 2045    Order Status: Resulted Specimen: Pericardial fluid Updated: 04/11/25 2127    Culture, Anaerobe [[id number]]     Order Status: Canceled Specimen: Pericardial fluid     Aerobic culture [[id number]]     Order Status: Canceled Specimen: Pericardial fluid     AFB Culture & Smear [[id number]]     Order Status: Canceled Specimen: Pericardial fluid             Radiology:  Small right sided pleural effusion on CXR from 4/14/25    I have personally reviewed the above labs and imaging.    Current Medications:     Infusions:       Scheduled:   albuterol sulfate  2.5 mg Nebulization Q8H    colchicine  0.6 mg Oral BID    ibuprofen  800 mg Oral Q8H    pantoprazole  40 mg Oral Daily        PRN:    Current Facility-Administered Medications:     acetaminophen, 650 mg, Oral, Q4H PRN    albuterol, 2 puff, Inhalation, Q4H PRN    ondansetron, 8 mg, Oral, Q8H PRN    sodium chloride 0.9%, 10 mL, Intravenous, PRN      Assessment:     Shekhar Snyder is a 24 y.o. male with:  Patient Active Problem List    Diagnosis Date Noted    Pleural effusion 04/15/2025    Pericarditis 03/12/2025    Pericardial effusion 03/11/2025    Chest pain 03/11/2025    Former smoker 03/11/2025    Closed displaced fracture of shaft of left clavicle 09/14/2022    Acute pain of right shoulder 01/17/2017    Attention deficit hyperactivity disorder (ADHD), predominantly inattentive type 06/30/2016    Mild early onset dysthymic disorder, in full remission, with anxious distress, with pure dysthymic syndrome 08/27/2013    Reading disorder 03/20/2013        Plan:     Pericarditis  - S/p pericardiocentesis  - Management per primary team    Acute hypoxic respiratory failure  - Unlikely due to small  right sided pleural effusion  - Recommend CT chest Non contrast  - Recommend Incentive spirometry and scheduled albuterol treatment  - Recommend ambulation and sitting in chair as much as possible    Right sided pleural Effusion  - Bedside US done showing a small pocket of fluid not enough to be drained at this time  - Unlikely the main cause of SOB and desaturation  - Recommend connective tissue/autoimmune work up   - Will hold off on thoracentesis for now    Reactive Airway disease  - Wheezing on exam   - Start scheduled albuterol while inpatient  - Recommend starting Breo or Advair inpatient and on discharge  - Send refferal to outpatient pulmonology for complete PFTs     Thank you for allowing us to participate in the care of this patient. Please contact me if you have any questions regarding this consult.    Benny Welch DO  [facility] Pulmonary & Critical Care Medicine Fellow          [1]   Social History  Tobacco Use    Smoking status: Every Day     Current packs/day: 0.50     Average packs/day: 0.5 packs/day for 5.3 years (2.6 ttl pk-yrs)     Types: Cigarettes, Vaping with nicotine     Start date: 2020     Passive exposure: Never    Smokeless tobacco: Never     "Tobacco comments:     Pt states that he started smoking cigarettes and vaping at age 19, but then quit using nicotine in any form 1 month ago when he started feeling ill.  EMR to be updated to reflect "Former Smoker" status when pt remains without relapse as of 2/12//2026.   Substance Use Topics    Alcohol use: No    Drug use: Yes     Types: Marijuana     "

## 2025-04-15 NOTE — SUBJECTIVE & OBJECTIVE
Interval History: Pt became hypoxic yesterday prior to discharge, consequently, was not discharged. Patient reports feeling somewhat improved, still reports chest discomfort and difficulty breathing.     Review of Systems   Constitutional: Negative for chills and fever.   Cardiovascular:  Positive for chest pain and dyspnea on exertion. Negative for orthopnea and palpitations.   Respiratory:  Positive for cough and shortness of breath. Negative for hemoptysis.    Musculoskeletal:  Positive for joint pain.   Gastrointestinal:  Negative for abdominal pain, change in bowel habit, constipation and diarrhea.     Objective:     Vital Signs (Most Recent):  Temp: 97 °F (36.1 °C) (04/15/25 0730)  Pulse: 93 (04/15/25 0800)  Resp: 17 (04/15/25 0800)  BP: 115/71 (04/15/25 0800)  SpO2: 95 % (04/15/25 0800) Vital Signs (24h Range):  Temp:  [97 °F (36.1 °C)-98.2 °F (36.8 °C)] 97 °F (36.1 °C)  Pulse:  [] 93  Resp:  [14-39] 17  SpO2:  [92 %-100 %] 95 %  BP: ()/(54-91) 115/71     Weight: 77 kg (169 lb 12.1 oz)  Body mass index is 21.22 kg/m².     SpO2: 95 %         Intake/Output Summary (Last 24 hours) at 4/15/2025 0901  Last data filed at 4/15/2025 0628  Gross per 24 hour   Intake 529.67 ml   Output 1178 ml   Net -648.33 ml       Lines/Drains/Airways       Peripheral Intravenous Line  Duration                  Peripheral IV - Single Lumen 04/14/25 1441 20 G Left Antecubital <1 day                       Physical Exam  Vitals and nursing note reviewed.   Constitutional:       General: He is not in acute distress.     Appearance: Normal appearance. He is not ill-appearing.   HENT:      Head: Normocephalic and atraumatic.   Cardiovascular:      Rate and Rhythm: Normal rate and regular rhythm.   Pulmonary:      Effort: Pulmonary effort is normal. No respiratory distress.      Breath sounds: Normal breath sounds. No wheezing.   Chest:      Comments: Drain removed.  Skin:     General: Skin is warm and dry.   Neurological:       General: No focal deficit present.      Mental Status: He is oriented to person, place, and time.            Significant Labs: All pertinent lab results from the last 24 hours have been reviewed.    Significant Imaging:  X-Ray Chest AP Portable  Narrative: EXAMINATION:  XR CHEST AP PORTABLE    CLINICAL HISTORY:  Pleural effusion;    TECHNIQUE:  Single frontal view of the chest was performed.    COMPARISON:  04/11/2025, CTA chest 03/10/2025    FINDINGS:  Clearing of severe water bottle configuration cardiomegaly, remaining mild cardiomegaly, status post suspected drainage of pericardial effusion.  New pleural effusion, consolidation, atelectasis left lower lobe, obscuring dome left hemidiaphragm in the interim.  New linea atelectasis basilar segment right lower lobe.  Postop ORIF therapy left clavicle, stable.  Impression: Status post suspected pericardial effusion removal.    New pleural reaction atelectasis and/or consolidation change left lower lobe, new discoid atelectasis right lung base appearing in the interim.    Electronically signed by: Joshua Iniguez MD  Date:    04/14/2025  Time:    14:21

## 2025-04-16 ENCOUNTER — ANESTHESIA EVENT (OUTPATIENT)
Dept: SURGERY | Facility: HOSPITAL | Age: 25
End: 2025-04-16
Payer: COMMERCIAL

## 2025-04-16 PROBLEM — I31.4 CARDIAC TAMPONADE: Status: ACTIVE | Noted: 2025-04-16

## 2025-04-16 PROBLEM — J98.11 ATELECTASIS: Status: ACTIVE | Noted: 2025-04-16

## 2025-04-16 PROBLEM — J96.01 ACUTE HYPOXEMIC RESPIRATORY FAILURE: Status: ACTIVE | Noted: 2025-04-16

## 2025-04-16 LAB
ABSOLUTE EOSINOPHIL (OHS): 0.47 K/UL
ABSOLUTE MONOCYTE (OHS): 1 K/UL (ref 0.3–1)
ABSOLUTE NEUTROPHIL COUNT (OHS): 8.19 K/UL (ref 1.8–7.7)
ALBUMIN FLD-MCNC: 3.5 G/DL
ALBUMIN SERPL BCP-MCNC: 3.1 G/DL (ref 3.5–5.2)
ALP SERPL-CCNC: 69 UNIT/L (ref 40–150)
ALT SERPL W/O P-5'-P-CCNC: 32 UNIT/L (ref 10–44)
ANION GAP (OHS): 9 MMOL/L (ref 8–16)
APTT PPP: 25.2 SECONDS (ref 21–32)
AST SERPL-CCNC: 25 UNIT/L (ref 11–45)
BASOPHILS # BLD AUTO: 0.06 K/UL
BASOPHILS NFR BLD AUTO: 0.5 %
BILIRUB SERPL-MCNC: 0.4 MG/DL (ref 0.1–1)
BODY FLD TYPE: NORMAL
BUN SERPL-MCNC: 9 MG/DL (ref 6–20)
C3 SERPL-MCNC: 178 MG/DL (ref 50–180)
C4 COMPLEMENT (OHS): 26 MG/DL (ref 11–44)
CALCIUM SERPL-MCNC: 9.3 MG/DL (ref 8.7–10.5)
CHLORIDE SERPL-SCNC: 105 MMOL/L (ref 95–110)
CK SERPL-CCNC: 46 U/L (ref 20–200)
CO2 SERPL-SCNC: 22 MMOL/L (ref 23–29)
CREAT SERPL-MCNC: 0.7 MG/DL (ref 0.5–1.4)
CRP SERPL-MCNC: 25.53 MG/L
CYCLIC CITRULLINATED PEPTIDE (CCP) (OHS): <0.5 U/ML
ERYTHROCYTE [DISTWIDTH] IN BLOOD BY AUTOMATED COUNT: 13.3 % (ref 11.5–14.5)
ESTROGEN SERPL-MCNC: NORMAL PG/ML
FERRITIN SERPL-MCNC: 305 NG/ML (ref 20–300)
FOLATE SERPL-MCNC: 10.1 NG/ML (ref 4–24)
GFR SERPLBLD CREATININE-BSD FMLA CKD-EPI: >60 ML/MIN/1.73/M2
GLUCOSE SERPL-MCNC: 95 MG/DL (ref 70–110)
HBV CORE AB SERPL QL IA: NORMAL
HBV SURFACE AB SER-ACNC: <3 MIU/ML
HBV SURFACE AB SERPL IA-ACNC: NORMAL M[IU]/ML
HBV SURFACE AG SERPL QL IA: NORMAL
HCT VFR BLD AUTO: 42.1 % (ref 40–54)
HCV AB SERPL QL IA: NORMAL
HGB BLD-MCNC: 13.8 GM/DL (ref 14–18)
HIV 1+2 AB+HIV1 P24 AG SERPL QL IA: NORMAL
IGA SERPL-MCNC: 147 MG/DL (ref 40–350)
IGG SERPL-MCNC: 806 MG/DL (ref 650–1600)
IGM SERPL-MCNC: 113 MG/DL (ref 50–300)
IMM GRANULOCYTES # BLD AUTO: 0.04 K/UL (ref 0–0.04)
IMM GRANULOCYTES NFR BLD AUTO: 0.3 % (ref 0–0.5)
INSULIN SERPL-ACNC: NORMAL U[IU]/ML
IRON SATN MFR SERPL: 11 % (ref 20–50)
IRON SERPL-MCNC: 36 UG/DL (ref 45–160)
LAB AP GROSS DESCRIPTION: NORMAL
LAB AP NON-GYN INTERPRETATION SPECIMEN 1: NORMAL
LAB AP PERFORMING LOCATION(S): NORMAL
LYMPHOCYTES # BLD AUTO: 2.13 K/UL (ref 1–4.8)
MAGNESIUM SERPL-MCNC: 2 MG/DL (ref 1.6–2.6)
MAYO GENERIC ORDERABLE RESULT: NORMAL
MCH RBC QN AUTO: 28.3 PG (ref 27–31)
MCHC RBC AUTO-ENTMCNC: 32.8 G/DL (ref 32–36)
MCV RBC AUTO: 86 FL (ref 82–98)
NUCLEATED RBC (/100WBC) (OHS): 0 /100 WBC
PLATELET # BLD AUTO: 386 K/UL (ref 150–450)
PMV BLD AUTO: 10.6 FL (ref 9.2–12.9)
POCT GLUCOSE: 122 MG/DL (ref 70–110)
POTASSIUM SERPL-SCNC: 4.3 MMOL/L (ref 3.5–5.1)
PROT SERPL-MCNC: 7.1 GM/DL (ref 6–8.4)
RBC # BLD AUTO: 4.87 M/UL (ref 4.6–6.2)
RELATIVE EOSINOPHIL (OHS): 4 %
RELATIVE LYMPHOCYTE (OHS): 17.9 % (ref 18–48)
RELATIVE MONOCYTE (OHS): 8.4 % (ref 4–15)
RELATIVE NEUTROPHIL (OHS): 68.9 % (ref 38–73)
RHEUMATOID FACT SERPL-ACNC: <13 IU/ML
SODIUM SERPL-SCNC: 136 MMOL/L (ref 136–145)
T PALLIDUM IGG+IGM SER QL: NORMAL
TIBC SERPL-MCNC: 333 UG/DL (ref 250–450)
TRANSFERRIN SERPL-MCNC: 225 MG/DL (ref 200–375)
V.ZOSTER IGG INTERP (OHS): POSITIVE
VARICELLA ZOSTER IGG (OHS): 5.56 S/CO
VIT B12 SERPL-MCNC: 395 PG/ML (ref 210–950)
WBC # BLD AUTO: 11.89 K/UL (ref 3.9–12.7)

## 2025-04-16 PROCEDURE — 86593 SYPHILIS TEST NON-TREP QUANT: CPT | Performed by: STUDENT IN AN ORGANIZED HEALTH CARE EDUCATION/TRAINING PROGRAM

## 2025-04-16 PROCEDURE — 83516 IMMUNOASSAY NONANTIBODY: CPT | Performed by: STUDENT IN AN ORGANIZED HEALTH CARE EDUCATION/TRAINING PROGRAM

## 2025-04-16 PROCEDURE — 36415 COLL VENOUS BLD VENIPUNCTURE: CPT | Performed by: INTERNAL MEDICINE

## 2025-04-16 PROCEDURE — 25000003 PHARM REV CODE 250: Performed by: STUDENT IN AN ORGANIZED HEALTH CARE EDUCATION/TRAINING PROGRAM

## 2025-04-16 PROCEDURE — 99900035 HC TECH TIME PER 15 MIN (STAT)

## 2025-04-16 PROCEDURE — 25000003 PHARM REV CODE 250: Performed by: INTERNAL MEDICINE

## 2025-04-16 PROCEDURE — 83520 IMMUNOASSAY QUANT NOS NONAB: CPT | Performed by: STUDENT IN AN ORGANIZED HEALTH CARE EDUCATION/TRAINING PROGRAM

## 2025-04-16 PROCEDURE — 86200 CCP ANTIBODY: CPT | Performed by: STUDENT IN AN ORGANIZED HEALTH CARE EDUCATION/TRAINING PROGRAM

## 2025-04-16 PROCEDURE — 85025 COMPLETE CBC W/AUTO DIFF WBC: CPT | Performed by: STUDENT IN AN ORGANIZED HEALTH CARE EDUCATION/TRAINING PROGRAM

## 2025-04-16 PROCEDURE — 86431 RHEUMATOID FACTOR QUANT: CPT | Performed by: STUDENT IN AN ORGANIZED HEALTH CARE EDUCATION/TRAINING PROGRAM

## 2025-04-16 PROCEDURE — 99233 SBSQ HOSP IP/OBS HIGH 50: CPT | Mod: ,,, | Performed by: INTERNAL MEDICINE

## 2025-04-16 PROCEDURE — 86036 ANCA SCREEN EACH ANTIBODY: CPT | Performed by: INTERNAL MEDICINE

## 2025-04-16 PROCEDURE — 83735 ASSAY OF MAGNESIUM: CPT | Performed by: STUDENT IN AN ORGANIZED HEALTH CARE EDUCATION/TRAINING PROGRAM

## 2025-04-16 PROCEDURE — 25000242 PHARM REV CODE 250 ALT 637 W/ HCPCS: Performed by: STUDENT IN AN ORGANIZED HEALTH CARE EDUCATION/TRAINING PROGRAM

## 2025-04-16 PROCEDURE — 87070 CULTURE OTHR SPECIMN AEROBIC: CPT

## 2025-04-16 PROCEDURE — 86235 NUCLEAR ANTIGEN ANTIBODY: CPT | Performed by: STUDENT IN AN ORGANIZED HEALTH CARE EDUCATION/TRAINING PROGRAM

## 2025-04-16 PROCEDURE — 99231 SBSQ HOSP IP/OBS SF/LOW 25: CPT | Mod: ,,, | Performed by: INTERNAL MEDICINE

## 2025-04-16 PROCEDURE — 86706 HEP B SURFACE ANTIBODY: CPT | Performed by: STUDENT IN AN ORGANIZED HEALTH CARE EDUCATION/TRAINING PROGRAM

## 2025-04-16 PROCEDURE — 63600175 PHARM REV CODE 636 W HCPCS: Performed by: INTERNAL MEDICINE

## 2025-04-16 PROCEDURE — 94799 UNLISTED PULMONARY SVC/PX: CPT

## 2025-04-16 PROCEDURE — 94640 AIRWAY INHALATION TREATMENT: CPT

## 2025-04-16 PROCEDURE — 27000221 HC OXYGEN, UP TO 24 HOURS

## 2025-04-16 PROCEDURE — 86141 C-REACTIVE PROTEIN HS: CPT | Performed by: STUDENT IN AN ORGANIZED HEALTH CARE EDUCATION/TRAINING PROGRAM

## 2025-04-16 PROCEDURE — 86200 CCP ANTIBODY: CPT | Performed by: INTERNAL MEDICINE

## 2025-04-16 PROCEDURE — 86037 ANCA TITER EACH ANTIBODY: CPT | Performed by: STUDENT IN AN ORGANIZED HEALTH CARE EDUCATION/TRAINING PROGRAM

## 2025-04-16 PROCEDURE — 86704 HEP B CORE ANTIBODY TOTAL: CPT | Performed by: STUDENT IN AN ORGANIZED HEALTH CARE EDUCATION/TRAINING PROGRAM

## 2025-04-16 PROCEDURE — 94761 N-INVAS EAR/PLS OXIMETRY MLT: CPT

## 2025-04-16 PROCEDURE — 86803 HEPATITIS C AB TEST: CPT | Performed by: STUDENT IN AN ORGANIZED HEALTH CARE EDUCATION/TRAINING PROGRAM

## 2025-04-16 PROCEDURE — 86160 COMPLEMENT ANTIGEN: CPT | Performed by: STUDENT IN AN ORGANIZED HEALTH CARE EDUCATION/TRAINING PROGRAM

## 2025-04-16 PROCEDURE — 82784 ASSAY IGA/IGD/IGG/IGM EACH: CPT | Performed by: STUDENT IN AN ORGANIZED HEALTH CARE EDUCATION/TRAINING PROGRAM

## 2025-04-16 PROCEDURE — 20600001 HC STEP DOWN PRIVATE ROOM

## 2025-04-16 PROCEDURE — 87389 HIV-1 AG W/HIV-1&-2 AB AG IA: CPT | Performed by: STUDENT IN AN ORGANIZED HEALTH CARE EDUCATION/TRAINING PROGRAM

## 2025-04-16 PROCEDURE — 87340 HEPATITIS B SURFACE AG IA: CPT | Performed by: STUDENT IN AN ORGANIZED HEALTH CARE EDUCATION/TRAINING PROGRAM

## 2025-04-16 PROCEDURE — 85730 THROMBOPLASTIN TIME PARTIAL: CPT | Performed by: STUDENT IN AN ORGANIZED HEALTH CARE EDUCATION/TRAINING PROGRAM

## 2025-04-16 PROCEDURE — 82746 ASSAY OF FOLIC ACID SERUM: CPT | Performed by: STUDENT IN AN ORGANIZED HEALTH CARE EDUCATION/TRAINING PROGRAM

## 2025-04-16 PROCEDURE — 82607 VITAMIN B-12: CPT | Performed by: STUDENT IN AN ORGANIZED HEALTH CARE EDUCATION/TRAINING PROGRAM

## 2025-04-16 PROCEDURE — 82787 IGG 1 2 3 OR 4 EACH: CPT | Performed by: STUDENT IN AN ORGANIZED HEALTH CARE EDUCATION/TRAINING PROGRAM

## 2025-04-16 PROCEDURE — 84466 ASSAY OF TRANSFERRIN: CPT | Performed by: STUDENT IN AN ORGANIZED HEALTH CARE EDUCATION/TRAINING PROGRAM

## 2025-04-16 PROCEDURE — 80053 COMPREHEN METABOLIC PANEL: CPT | Performed by: STUDENT IN AN ORGANIZED HEALTH CARE EDUCATION/TRAINING PROGRAM

## 2025-04-16 PROCEDURE — 99233 SBSQ HOSP IP/OBS HIGH 50: CPT | Mod: ,,, | Performed by: EMERGENCY MEDICINE

## 2025-04-16 PROCEDURE — 86787 VARICELLA-ZOSTER ANTIBODY: CPT | Performed by: STUDENT IN AN ORGANIZED HEALTH CARE EDUCATION/TRAINING PROGRAM

## 2025-04-16 PROCEDURE — 82550 ASSAY OF CK (CPK): CPT | Performed by: STUDENT IN AN ORGANIZED HEALTH CARE EDUCATION/TRAINING PROGRAM

## 2025-04-16 PROCEDURE — 82728 ASSAY OF FERRITIN: CPT | Performed by: STUDENT IN AN ORGANIZED HEALTH CARE EDUCATION/TRAINING PROGRAM

## 2025-04-16 PROCEDURE — 86682 HELMINTH ANTIBODY: CPT | Performed by: STUDENT IN AN ORGANIZED HEALTH CARE EDUCATION/TRAINING PROGRAM

## 2025-04-16 RX ORDER — LIDOCAINE 50 MG/G
1 PATCH TOPICAL
Status: DISCONTINUED | OUTPATIENT
Start: 2025-04-16 | End: 2025-04-21 | Stop reason: HOSPADM

## 2025-04-16 RX ADMIN — AMPICILLIN SODIUM AND SULBACTAM SODIUM 3 G: 2; 1 INJECTION, POWDER, FOR SOLUTION INTRAMUSCULAR; INTRAVENOUS at 08:04

## 2025-04-16 RX ADMIN — COLCHICINE 0.6 MG: 0.6 TABLET, FILM COATED ORAL at 08:04

## 2025-04-16 RX ADMIN — LIDOCAINE 1 PATCH: 50 PATCH CUTANEOUS at 10:04

## 2025-04-16 RX ADMIN — IBUPROFEN 800 MG: 400 TABLET ORAL at 02:04

## 2025-04-16 RX ADMIN — IBUPROFEN 800 MG: 400 TABLET ORAL at 09:04

## 2025-04-16 RX ADMIN — PANTOPRAZOLE SODIUM 40 MG: 40 TABLET, DELAYED RELEASE ORAL at 08:04

## 2025-04-16 RX ADMIN — AMPICILLIN SODIUM AND SULBACTAM SODIUM 3 G: 2; 1 INJECTION, POWDER, FOR SOLUTION INTRAMUSCULAR; INTRAVENOUS at 03:04

## 2025-04-16 RX ADMIN — ALBUTEROL SULFATE 2.5 MG: 2.5 SOLUTION RESPIRATORY (INHALATION) at 04:04

## 2025-04-16 RX ADMIN — IBUPROFEN 800 MG: 400 TABLET ORAL at 04:04

## 2025-04-16 RX ADMIN — ALBUTEROL SULFATE 2.5 MG: 2.5 SOLUTION RESPIRATORY (INHALATION) at 12:04

## 2025-04-16 RX ADMIN — ALBUTEROL SULFATE 2.5 MG: 2.5 SOLUTION RESPIRATORY (INHALATION) at 07:04

## 2025-04-16 RX ADMIN — ALBUTEROL SULFATE 2.5 MG: 2.5 SOLUTION RESPIRATORY (INHALATION) at 11:04

## 2025-04-16 NOTE — CARE UPDATE
I have reviewed the chart of Shekhar Snyder who is hospitalized for the following:    Active Hospital Problems    Diagnosis    *Pericardial effusion    Cardiac tamponade     Admitted with pericardial effusion with tamponade physiology, S/P pericardiocentesis on 4/11/2025 with removal of 1500cc   Plan to repeat echo prior to discharge       Acute hypoxemic respiratory failure     Plan to discharge on 4/14 but pt became hypoxic and found to have pleural effusion on US and CXR  - Pulmonology consulted  - Wean oxygen as tolerated      Atelectasis     Chest CT 4/15 with interval occlusion of left inferior lobar bronchus with essentially complete collapse of the basilar left lower lobe  Pulm consulted  Wean o2  IS ordered      Pleural effusion     CT with moderate left pleural effusion, continue to monitor with serial imaging      Pericarditis    Former smoker    Attention deficit hyperactivity disorder (ADHD), predominantly inattentive type        Cristina Morse PA-C  [facility]

## 2025-04-16 NOTE — PROGRESS NOTES
[facility]ensive [facility]  Cardiology  Progress Note    Patient Name: Shekhar Snyder  MRN: [id number]  Admission Date: 4/11/2025  Hospital Length of Stay: 5 days  Code Status: Full Code   Attending Physician: Ernesto Wing MD   Primary Care Physician: Fracisco Phipps MD  Expected Discharge Date: 4/16/2025  Principal Problem:Pericardial effusion    Subjective:     Hospital Course:   Admitted for pericarditis and pericardial effusion with tamponade physiology. 1.6L of pericardial fluid were drained by interventional cardiology. Started on ibuprofen and colchicine. Drainage improved with eventual removal of drain. Some pleural effusions were noted, however, O2 saturations remained stable. CXR ordered prior to discharge revealed improvement in pericardial effusion and new pleural reaction atelectasis and/or consolidation change in left lower lobe.     Patient was medically cleared for discharge, however, prior to discharge patient became hypoxic, CXR was done which showed pleural effusion/atelectasis and was not discharged. Pulmonology was consulted, deferred thoracentesis, recommended CT chest. Rheumatology was also consulted for evaluation of possible autoimmune processes.     Interval History: NAEON. Pt was walked in the morning without O2 (down to 94%). Still endorsing chest pain (although improving) and SOB/MCCALLUM. Rheumatology labs pending. Pulm decision for thora pending.    Review of Systems   Constitutional: Negative for chills and fever.   Cardiovascular:  Positive for chest pain and dyspnea on exertion. Negative for orthopnea and palpitations.   Respiratory:  Positive for cough and shortness of breath. Negative for hemoptysis.    Gastrointestinal:  Negative for abdominal pain, change in bowel habit, constipation and diarrhea.     Objective:     Vital Signs (Most Recent):  Temp: 98.1 °F (36.7 °C) (04/16/25 0700)  Pulse: 100 (04/16/25 1115)  Resp: (!) 24 (04/16/25 1115)  BP: 114/68 (04/16/25  1113)  SpO2: 95 % (04/16/25 1115) Vital Signs (24h Range):  Temp:  [98.1 °F (36.7 °C)-98.9 °F (37.2 °C)] 98.1 °F (36.7 °C)  Pulse:  [] 100  Resp:  [14-28] 24  SpO2:  [93 %-99 %] 95 %  BP: ()/(52-82) 114/68     Weight: 77 kg (169 lb 12.1 oz)  Body mass index is 21.22 kg/m².     SpO2: 95 %         Intake/Output Summary (Last 24 hours) at 4/16/2025 1151  Last data filed at 4/16/2025 0714  Gross per 24 hour   Intake 1090 ml   Output 1350 ml   Net -260 ml       Lines/Drains/Airways       Peripheral Intravenous Line  Duration                  Peripheral IV - Single Lumen 04/14/25 1441 20 G Left Antecubital 1 day                       Physical Exam  Vitals and nursing note reviewed.   Constitutional:       General: He is not in acute distress.     Appearance: Normal appearance. He is not ill-appearing.   HENT:      Head: Normocephalic and atraumatic.   Cardiovascular:      Rate and Rhythm: Normal rate and regular rhythm.      Comments: Bedside echo showed some trace pericardial effusion.   Pulmonary:      Effort: Pulmonary effort is normal. No respiratory distress.      Breath sounds: Normal breath sounds. No wheezing.   Chest:      Comments: Drain removed.  Skin:     General: Skin is warm and dry.   Neurological:      General: No focal deficit present.      Mental Status: He is oriented to person, place, and time.            Significant Labs: All pertinent lab results from the last 24 hours have been reviewed.    Significant Imaging:   CT Chest Without Contrast  Narrative: EXAMINATION:  CT CHEST WITHOUT CONTRAST    CLINICAL HISTORY:  Pleural effusion;    TECHNIQUE:  Axial images, sagittal and coronal reformations were obtained from the thoracic inlet to the lung bases. Contrast was not administered.    COMPARISON:  CTA chest 03/10/2025, x-ray chest 04/14/2025    FINDINGS:  LINES/TUBES: None.    SOFT TISSUES: No axillary or supraclavicular lymphadenopathy.  The visualized thyroid gland is unremarkable.    HEART  AND MEDIASTINUM: Multiple prominent mediastinal nodes.  Small pericardial effusion, with interval improvement compared to 03/10/2025. Left-sided aortic arch. The main pulmonary artery is normal in size.    LUNGS/AIRWAYS/PLEURA: Occlusion of the left inferior lobar bronchus (series 302, image 245), with essentially complete collapse of the basal segments of the left lung.  Mild hilar fullness, suboptimally evaluated in this noncontrast enhanced study.  Moderate left pleural effusion with simple fluid attenuation, new compared to 03/10/2025. Mucous plugging of several right lower lobe bronchioles (for example series 302 images 320, 328) with distal subsegmental atelectasis.    UPPER ABDOMEN: Unremarkable.    BONES: No fracture or focal osseous lesion.  Impression: Compared to 03/10/2025, interval occlusion of left inferior lobar bronchus with essentially complete collapse of the basilar left lower lobe.  Moderate left pleural effusion.  In the context of acute setting given normal left lower lobe bronchi on CT dated 03/10/2025 findings favored to represent mucous plugging.  Intraluminal lesion is less favored although cannot be entirely excluded.  Short-term follow-up is recommended with CT chest with contrast.    Mucous plugging of several right lower lobe bronchioles with subsegmental atelectasis.    Small pericardial effusion with interval improvement compared to 03/10/2025.    This report was flagged in Epic as abnormal.    Electronically signed by resident: Iftikhar Hernandez  Date:    04/16/2025  Time:    09:44    Electronically signed by: Navin Canada  Date:    04/16/2025  Time:    11:12      Assessment and Plan:     * Pericardial effusion  Symptomatic with pain and SOB, elevations in CRP. EKG unremarkable. Hemodynamics stable, however, echo showed massive effusion, CVP 15, RV diastolic collapse and early cardiac tamponade, consequently, underwent STAT pericardiocentesis and 1.6 L fluid removed with drain  left in place.   F/u studies  Continue colchicine and ibuprofen         Pleural effusion  Patient found to have new pleural effusion and/or consolidation change in LLL noted on bedside echo and CXR done after removal of pericardial drain. Pt was preparing to discharge when O2 saturations dropped. Could be related to inflammatory process from pericarditis.   CT chest done - Occlusion of the left inferior lobar bronchus, with essentially complete collapse of the basal segments of the left lung. Left pleural effusion present, mucous plugging of several right lower lobe bronchioles and atelectasis.   Pulm consulted - no thora at this moment  CT chest  Rheum consult for eval of possible autoimmune process - numerous labs pending  Pulm recs  Possible bronch  Echo with bubble study  Unasyn      Pericarditis  Sent to hospital from outpatient cardiology follow up for initial episode of pericarditis from March. Etiology unknown, thought to be 2/2 to influenza infection from February. Incomplete adherence to ibuprofen and colchicine 2/2 GI side effects. Unclear if current presentation is recurrent pericarditis vs incompletely treated primary episode.    Continue ibuprofen and colchicine  PPI  If no resolution of symptoms, can consider steroid course        VTE Risk Mitigation (From admission, onward)           Ordered     IP VTE HIGH RISK PATIENT  Once         04/11/25 2151     Place sequential compression device  Until discontinued         04/11/25 2151                    Rowena Marquez DO  Cardiology  [facility]

## 2025-04-16 NOTE — NURSING TRANSFER
Nursing Transfer Note      4/16/2025   3:40 PM    Nurse giving handoff:Juliette Herrera   Nurse receiving handoff:CSU RN    Reason patient is being transferred: Step down orders    Transfer To: CSU    Transfer via wheelchair    Transfer with cardiac monitoring    Transported by RN     Transfer Vital Signs:  Blood Pressure:113/56  Heart Rate:97  O2:94  Temperature:98.7  Respirations:19    Telemetry: Box Number [id number]  Order for Tele Monitor? Yes    Medicines sent: yes    Patient belongings transferred with patient: Yes    Chart send with patient: Yes    Notified: mother    Patient reassessed at: 1534@ 4/16/25   1  Upon arrival to floor: cardiac monitor applied, patient oriented to room, call bell in reach, and bed in lowest position

## 2025-04-16 NOTE — ASSESSMENT & PLAN NOTE
Patient found to have new pleural effusion and/or consolidation change in LLL noted on bedside echo and CXR done after removal of pericardial drain. Pt was preparing to discharge when O2 saturations dropped. Could be related to inflammatory process from pericarditis.   CT chest done - Occlusion of the left inferior lobar bronchus, with essentially complete collapse of the basal segments of the left lung. Left pleural effusion present, mucous plugging of several right lower lobe bronchioles and atelectasis.   Pulm consulted - no stefania at this moment  CT chest  Rheum consult for eval of possible autoimmune process - numerous labs pending  Pulm recs  Possible bronch  Echo with bubble study  Unasyn

## 2025-04-16 NOTE — CONSULTS
[facility] - [facility] Intensive [facility]  Pulmonology  Consult Note    Patient Name: Shekhar Snyder  MRN: [id number]  Admission Date: 4/11/2025  Hospital Length of Stay: 5 days  Code Status: Full Code  Attending Physician: Ernesto Wing MD  Primary Care Provider: Fracisco Phipps MD   Principal Problem: Pericardial effusion    Consults  Subjective:     HPI:  Mr. Shekhar Snyder is a 24 y.o. male w/ PMH of ADHD, Depression, and PTSD. The patient presented to [facility] on 4/11/2025 with a primary complaint of Chest Pain found to have a large recurrent pericardial effusion with tamponade physiology. The patient initially presented on 2/6/25 to urgent care with Influenza A, manifesting as headache, myalgia, GI symptoms, cough, congestion, and dyspnea on exertion. In early March (3/10-3/12), they were hospitalized for mid-sternal chest pain, dyspnea, and lightheadedness concerning for post-viral pericarditis, managed with NSAIDs and colchicine. An echocardiogram revealed a moderate pericardial effusion without tamponade. NSAIDs were tapered due to GI intolerance, and although symptoms improved, they did not fully resolve. On 4/11/25, outpatient cardiology evaluation noted a large pericardial effusion with tamponade physiology, prompting hospital admission. Pericardiocentesis was performed with removal of 1500cc of serous fluid. The patient was restarted on NSAIDs and colchicine.     Discharge was planned for 4/14, but the patient developed hypoxia and imaging revealed a new pleural effusion. Pulmonology was consulted for evaluation and management, including possible thoracentesis. Repeat CXR showing small pleural effusion. Pulmonology team consulted for evaluation.    Patient evaluated at bedside. Patient states that he gets sharp substernal chest pain on deep inspiration and is currently SOB requiring 3L nasal cannula. Patient states that his shortness of breath is worsened with exertion and laying on his right side. Patient  "denies fever, cough, headaches, abd pain, nausea, vomiting, diarrhea, constipation.    Past Medical History:   Diagnosis Date    ADHD (attention deficit hyperactivity disorder)     Depression     PTSD (post-traumatic stress disorder)        Past Surgical History:   Procedure Laterality Date    ADENOIDECTOMY      CIRCUMCISION      OPEN REDUCTION AND INTERNAL FIXATION (ORIF) OF FRACTURE OF CLAVICLE Left 9/20/2022    Procedure: ORIF, FRACTURE, CLAVICLE;  Surgeon: Satya Reynoso IV, MD;  Location: [facility] OR;  Service: Orthopedics;  Laterality: Left;  Beach chair, padded darling stand (no arm krishnamurthy needed), Large C arm come in from head of bed; acumed clavicle system  dian notified 9/19 @ [facility]    PERICARDIOCENTESIS N/A 4/11/2025    Procedure: Pericardiocentesis;  Surgeon: Ismael Laguna Jr., MD;  Location: [facility] CATH LAB;  Service: Cardiology;  Laterality: N/A;    TONSILLECTOMY      TYMPANOSTOMY TUBE PLACEMENT         Review of patient's allergies indicates:   Allergen Reactions    Omnicef [cefdinir] Edema       Family History       Problem Relation (Age of Onset)    ADD / ADHD Mother    Alcohol abuse Father    Asthma Brother    Bipolar disorder Maternal Uncle    Depression Mother    Drug abuse Father    Personality disorder Father    Physical abuse Paternal Grandmother    Oswald Brock sequence Brother          Tobacco Use    Smoking status: Every Day     Current packs/day: 0.50     Average packs/day: 0.5 packs/day for 5.3 years (2.6 ttl pk-yrs)     Types: Cigarettes, Vaping with nicotine     Start date: 2020     Passive exposure: Never    Smokeless tobacco: Never    Tobacco comments:     Pt states that he started smoking cigarettes and vaping at age 19, but then quit using nicotine in any form 1 month ago when he started feeling ill.  EMR to be updated to reflect "Former Smoker" status when pt remains without relapse as of 2/12//2026.   Substance and Sexual Activity    Alcohol use: No    Drug use: Yes     Types: " Marijuana    Sexual activity: Never         Review of Systems   Constitutional: Negative.  Negative for chills and fever.   HENT: Negative.     Eyes: Negative.  Negative for pain.   Respiratory:  Positive for cough, chest tightness and shortness of breath. Negative for apnea, choking, wheezing and stridor.    Cardiovascular: Negative.  Negative for chest pain, palpitations and leg swelling.   Gastrointestinal: Negative.  Negative for abdominal pain, constipation, diarrhea and nausea.   Endocrine: Negative.    Genitourinary: Negative.  Negative for dysuria.   Musculoskeletal: Negative.    Skin: Negative.    Allergic/Immunologic: Negative.    Neurological: Negative.  Negative for headaches.   Hematological: Negative.    All other systems reviewed and are negative.    Objective:     Vital Signs (Most Recent):  Temp: 98.1 °F (36.7 °C) (04/16/25 0700)  Pulse: 109 (04/16/25 0945)  Resp: 17 (04/16/25 0945)  BP: 126/64 (04/16/25 0900)  SpO2: 95 % (04/16/25 0945) Vital Signs (24h Range):  Temp:  [98.1 °F (36.7 °C)-98.9 °F (37.2 °C)] 98.1 °F (36.7 °C)  Pulse:  [] 109  Resp:  [16-28] 17  SpO2:  [93 %-99 %] 95 %  BP: ()/(52-82) 126/64     Weight: 77 kg (169 lb 12.1 oz)  Body mass index is 21.22 kg/m².      Intake/Output Summary (Last 24 hours) at 4/16/2025 1035  Last data filed at 4/16/2025 0714  Gross per 24 hour   Intake 1090 ml   Output 1350 ml   Net -260 ml        Physical Exam  Vitals and nursing note reviewed.   Constitutional:       General: He is not in acute distress.     Appearance: Normal appearance. He is normal weight. He is not ill-appearing or toxic-appearing.   HENT:      Head: Normocephalic and atraumatic.      Right Ear: Tympanic membrane normal.      Left Ear: Tympanic membrane normal.      Nose: Nose normal.      Mouth/Throat:      Mouth: Mucous membranes are moist.   Eyes:      Pupils: Pupils are equal, round, and reactive to light.   Cardiovascular:      Rate and Rhythm: Normal rate and regular  rhythm.      Pulses: Normal pulses.      Heart sounds: Normal heart sounds.   Musculoskeletal:      Cervical back: Normal range of motion.   Neurological:      Mental Status: He is alert.          Vents:  Oxygen Concentration (%): 32 (04/13/25 0339)    Lines/Drains/Airways       Peripheral Intravenous Line  Duration                  Peripheral IV - Single Lumen 04/14/25 1441 20 G Left Antecubital 1 day                    Significant Labs:    CBC/Anemia Profile:  Recent Labs   Lab 04/15/25  0310 04/16/25  0518   WBC 9.26 11.89   HGB 12.2* 13.8*   HCT 37.5* 42.1    386   MCV 87 86   RDW 13.2 13.3   IRON  --  36*   FERRITIN  --  305.0*   FOLATE  --  10.1   BIERQLVB84  --  395        Chemistries:  Recent Labs   Lab 04/15/25  0310 04/16/25  0518    136   K 3.4* 4.3   * 105   CO2 21* 22*   BUN 10 9   CREATININE 0.6 0.7   CALCIUM 7.1* 9.3   ALBUMIN 2.4* 3.1*   BILITOT 0.2 0.4   ALKPHOS 48 69   ALT 22 32   AST 17 25   GLUCOSE 84 95   MG 1.8 2.0       CMP:   Recent Labs   Lab 04/15/25  0310 04/16/25  0518    136   K 3.4* 4.3   * 105   CO2 21* 22*   BUN 10 9   CREATININE 0.6 0.7   CALCIUM 7.1* 9.3   ALBUMIN 2.4* 3.1*   BILITOT 0.2 0.4   ALKPHOS 48 69   AST 17 25   ALT 22 32   ANIONGAP 6* 9       Significant Imaging:   I have reviewed all pertinent imaging results/findings within the past 24 hours.  Assessment/Plan:     No new Assessment & Plan notes have been filed under this hospital service since the last note was generated.  Service: Pulmonology        Thank you for your consult. {SIGN OFF/FOLLOW-UP:26865}     Flakito Porras MD  Pulmonology  [facility] - Surgical Intensive Care

## 2025-04-16 NOTE — SUBJECTIVE & OBJECTIVE
Interval History: NAEON. Pt was walked in the morning without O2 (down to 94%). Still endorsing chest pain (although improving) and SOB/MCCALLUM. Rheumatology labs pending. Pulm decision for thora pending.    Review of Systems   Constitutional: Negative for chills and fever.   Cardiovascular:  Positive for chest pain and dyspnea on exertion. Negative for orthopnea and palpitations.   Respiratory:  Positive for cough and shortness of breath. Negative for hemoptysis.    Gastrointestinal:  Negative for abdominal pain, change in bowel habit, constipation and diarrhea.     Objective:     Vital Signs (Most Recent):  Temp: 98.1 °F (36.7 °C) (04/16/25 0700)  Pulse: 100 (04/16/25 1115)  Resp: (!) 24 (04/16/25 1115)  BP: 114/68 (04/16/25 1113)  SpO2: 95 % (04/16/25 1115) Vital Signs (24h Range):  Temp:  [98.1 °F (36.7 °C)-98.9 °F (37.2 °C)] 98.1 °F (36.7 °C)  Pulse:  [] 100  Resp:  [14-28] 24  SpO2:  [93 %-99 %] 95 %  BP: ()/(52-82) 114/68     Weight: 77 kg (169 lb 12.1 oz)  Body mass index is 21.22 kg/m².     SpO2: 95 %         Intake/Output Summary (Last 24 hours) at 4/16/2025 1151  Last data filed at 4/16/2025 0714  Gross per 24 hour   Intake 1090 ml   Output 1350 ml   Net -260 ml       Lines/Drains/Airways       Peripheral Intravenous Line  Duration                  Peripheral IV - Single Lumen 04/14/25 1441 20 G Left Antecubital 1 day                       Physical Exam  Vitals and nursing note reviewed.   Constitutional:       General: He is not in acute distress.     Appearance: Normal appearance. He is not ill-appearing.   HENT:      Head: Normocephalic and atraumatic.   Cardiovascular:      Rate and Rhythm: Normal rate and regular rhythm.      Comments: Bedside echo showed some trace pericardial effusion.   Pulmonary:      Effort: Pulmonary effort is normal. No respiratory distress.      Breath sounds: Normal breath sounds. No wheezing.   Chest:      Comments: Drain removed.  Skin:     General: Skin is warm  and dry.   Neurological:      General: No focal deficit present.      Mental Status: He is oriented to person, place, and time.            Significant Labs: All pertinent lab results from the last 24 hours have been reviewed.    Significant Imaging:   CT Chest Without Contrast  Narrative: EXAMINATION:  CT CHEST WITHOUT CONTRAST    CLINICAL HISTORY:  Pleural effusion;    TECHNIQUE:  Axial images, sagittal and coronal reformations were obtained from the thoracic inlet to the lung bases. Contrast was not administered.    COMPARISON:  CTA chest 03/10/2025, x-ray chest 04/14/2025    FINDINGS:  LINES/TUBES: None.    SOFT TISSUES: No axillary or supraclavicular lymphadenopathy.  The visualized thyroid gland is unremarkable.    HEART AND MEDIASTINUM: Multiple prominent mediastinal nodes.  Small pericardial effusion, with interval improvement compared to 03/10/2025. Left-sided aortic arch. The main pulmonary artery is normal in size.    LUNGS/AIRWAYS/PLEURA: Occlusion of the left inferior lobar bronchus (series 302, image 245), with essentially complete collapse of the basal segments of the left lung.  Mild hilar fullness, suboptimally evaluated in this noncontrast enhanced study.  Moderate left pleural effusion with simple fluid attenuation, new compared to 03/10/2025. Mucous plugging of several right lower lobe bronchioles (for example series 302 images 320, 328) with distal subsegmental atelectasis.    UPPER ABDOMEN: Unremarkable.    BONES: No fracture or focal osseous lesion.  Impression: Compared to 03/10/2025, interval occlusion of left inferior lobar bronchus with essentially complete collapse of the basilar left lower lobe.  Moderate left pleural effusion.  In the context of acute setting given normal left lower lobe bronchi on CT dated 03/10/2025 findings favored to represent mucous plugging.  Intraluminal lesion is less favored although cannot be entirely excluded.  Short-term follow-up is recommended with CT chest  with contrast.    Mucous plugging of several right lower lobe bronchioles with subsegmental atelectasis.    Small pericardial effusion with interval improvement compared to 03/10/2025.    This report was flagged in Epic as abnormal.    Electronically signed by resident: Iftikhar Hernandez  Date:    04/16/2025  Time:    09:44    Electronically signed by: Navin Canada  Date:    04/16/2025  Time:    11:12

## 2025-04-16 NOTE — PROGRESS NOTES
"[facility] - [facility]  Rheumatology  Progress Note    Patient Name: Shekhar Snyder  MRN: [id number]  Admission Date: 4/11/2025  Hospital Length of Stay: 5 days  Code Status: Full Code   Attending Provider: Ernesto Wing MD  Primary Care Physician: Fracisco Phipps MD  Principal Problem: Pericardial effusion    Subjective:     HPI: Shekhar Snyder is a 23yo M with PMH of post viral/Influenza A pericarditis, mild intermittent asthma, daily marijuana use, prior tobacco/nicotine use (quit 2-3 mos ago), ADHD, PTSD, depression.    Recent Pertinent History:  - 2/6/25: Seen in urgent care and dx with Influenza A - with symptoms of headaches, myalgia, nausea, vomiting, diarrhea, cough, congestion, MCCALLUM x 3-5 days  - 3/10-12/25: Admitted with complaints of mid sternal chest pain/tightness, dyspnea, lightheadedness x 4 days, symptoms worse with lying back, leaning forward, exertion, or deep inhalation - managed for suspected post viral pericarditis, with NSAIDs and colchicine  - Echo (3/11/25) with moderate pericardial effusion without tamponade  - Developed GI side effects with high dose ibuprofen, so decreased and stopped it eventually  - Symptoms never completely resolved but had improved in the interim    Current Hospitalization (4/11/25-current):  - Seen by outpt cardiology on 4/11 and found to have persistent vs recurrent large effusion, now with tamponade  - Therefore admitted to hospital for acute pericarditis/effusion/tamponade management  - 4/11: taken to cath lab for pericardiocentesis, s/p removal of 1500cc serous fluid  - Restarted high dose NSAIDs (ibuprofen 800mg Q8h) and colchicine 0.6mg BID  - Pt was going to be d/c'd on 4/14 but got hypoxic and found to have pleural effusion on US and CXR, so stayed in hospital  - Pulmonology evaluated, no indication for thoracentesis at this time    Rheumatology was consulted for "Recurrent pericarditis, pleural effusion, and family hx of pericarditis. Concerns " "for autoimmune process? Connective tissue disorder?"    On initial evaluation, pt endorses the above hx. History is obtained from pt and his mother at bedside. Continues to have some dyspnea, cough, and chest tightness, O2 sats drop with any exertion. Denies any current or recent h/o fevers, chills, other infectious/sick contacts (other than prior flu in February), nausea, vomiting, GI changes, urinary changes, joint pain, joint swelling, joint stiffness (other that occasional mild ankle stiffness), skin rash/changes, oral/nasal ulcers, inflammatory eye problems, alopecia. Pt endorses some light sensitivity in his eyes.     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    Past Medical History:   Diagnosis Date    ADHD (attention deficit hyperactivity disorder)     Depression     PTSD (post-traumatic stress disorder)        Past Surgical History:   Procedure Laterality Date    ADENOIDECTOMY      CIRCUMCISION      OPEN REDUCTION AND INTERNAL FIXATION (ORIF) OF FRACTURE OF CLAVICLE Left 9/20/2022    Procedure: ORIF, FRACTURE, CLAVICLE;  Surgeon: Satya Reynoso IV, MD;  Location: [facility] OR;  Service: Orthopedics;  Laterality: Left;  Beach chair, padded darling stand (no arm krishnamurthy needed), Large C arm come in from head of bed; acumed clavicle system  dian notified 9/19 @ 101[facility]    PERICARDIOCENTESIS N/A 4/11/2025    Procedure: Pericardiocentesis;  Surgeon: Ismael Laguna Jr., MD;  Location: [facility] CATH LAB;  Service: Cardiology;  Laterality: N/A;    TONSILLECTOMY      TYMPANOSTOMY TUBE PLACEMENT         Immunization History   Administered Date(s) Administered    DTaP 09/13/2002, 04/15/2003, " 07/22/2009, 04/01/2014    DTaP (5 Pertussis Antigens) 2001    DTaP / Hep B / IPV 11/29/2006, 05/10/2007, 06/25/2008    HPV 9-Valent 02/08/2019    HPV Quadrivalent 04/01/2014    Hep B / HiB 2001, 02/15/2002, 01/27/2003    Hepatitis A, Pediatric/Adolescent, 2 Dose 07/22/2009, 05/09/2012    HiB PRP-T 06/25/2008, 07/22/2009    Hib-HbOC 11/29/2006, 05/10/2007    IPV 2001, 02/15/2002, 04/15/2003, 07/22/2009, 04/01/2014    Influenza 11/07/2005, 11/18/2008    Influenza - Intranasal - Trivalent 11/08/2007    Influenza A (H1N1) 2009 Monovalent - IM 11/03/2009    Influenza Split 11/08/2007, 11/18/2008    MMR 01/27/2003, 06/25/2008, 07/22/2009, 04/01/2014    Meningococcal Conjugate 04/01/2014    Meningococcal Conjugate (MCV4P) 04/01/2014, 02/08/2019    Pneumococcal Conjugate - 7 Valent 02/15/2002, 04/15/2003, 11/29/2006, 05/10/2007, 06/25/2008, 07/22/2009    Rotavirus Pentavalent 11/29/2006    Tdap 04/01/2014    Varicella 10/18/2002, 03/09/2007, 06/25/2008, 04/01/2014       Review of patient's allergies indicates:   Allergen Reactions    Omnicef [cefdinir] Edema     Current Facility-Administered Medications   Medication Frequency    acetaminophen tablet 650 mg Q4H PRN    albuterol inhaler 2 puff Q4H PRN    albuterol sulfate nebulizer solution 2.5 mg Q8H    colchicine capsule/tablet 0.6 mg BID    ibuprofen tablet 800 mg Q8H    LIDOcaine 5 % patch 1 patch Q24H    ondansetron disintegrating tablet 8 mg Q8H PRN    pantoprazole EC tablet 40 mg Daily    sodium chloride 0.9% flush 10 mL PRN     Family History       Problem Relation (Age of Onset)    ADD / ADHD Mother    Alcohol abuse Father    Asthma Brother    Bipolar disorder Maternal Uncle    Depression Mother    Drug abuse Father    Personality disorder Father    Physical abuse Paternal Grandmother    Oswald Brock sequence Brother          Tobacco Use    Smoking status: Every Day     Current packs/day: 0.50     Average packs/day: 0.5 packs/day for 5.3 years (2.6  "ttl pk-yrs)     Types: Cigarettes, Vaping with nicotine     Start date: 2020     Passive exposure: Never    Smokeless tobacco: Never    Tobacco comments:     Pt states that he started smoking cigarettes and vaping at age 19, but then quit using nicotine in any form 1 month ago when he started feeling ill.  EMR to be updated to reflect "Former Smoker" status when pt remains without relapse as of 2/12//2026.   Substance and Sexual Activity    Alcohol use: No    Drug use: Yes     Types: Marijuana    Sexual activity: Never     Objective:     Vital Signs (Most Recent):  Temp: 98.1 °F (36.7 °C) (04/16/25 0700)  Pulse: 109 (04/16/25 0945)  Resp: 17 (04/16/25 0945)  BP: 126/64 (04/16/25 0900)  SpO2: 95 % (04/16/25 0945) Vital Signs (24h Range):  Temp:  [98.1 °F (36.7 °C)-98.9 °F (37.2 °C)] 98.1 °F (36.7 °C)  Pulse:  [] 109  Resp:  [16-28] 17  SpO2:  [93 %-99 %] 95 %  BP: ()/(52-82) 126/64     Weight: 77 kg (169 lb 12.1 oz) (04/15/25 0424)  Body mass index is 21.22 kg/m².  Body surface area is 2.02 meters squared.      Intake/Output Summary (Last 24 hours) at 4/16/2025 1010  Last data filed at 4/16/2025 0714  Gross per 24 hour   Intake 1090 ml   Output 1350 ml   Net -260 ml         Physical Exam   Constitutional: He is oriented to person, place, and time. He appears well-developed and well-nourished. No distress.   HENT:   Head: Normocephalic and atraumatic.   Right Ear: External ear normal.   Left Ear: External ear normal.   Nose: Nose normal. No nasal congestion.   Mouth/Throat: Oropharynx is clear and moist. Mucous membranes are moist. Oropharynx is clear.   Eyes: Conjunctivae are normal.   Cardiovascular: Normal rate and regular rhythm.   Pulmonary/Chest: Effort normal. No respiratory distress. He has no wheezes.   Abdominal: Soft. He exhibits no distension. There is no abdominal tenderness.   Musculoskeletal:         General: No swelling or tenderness. Normal range of motion.      Cervical back: Normal range " of motion and neck supple.      Comments: No acute synovitis in any of the small or large joints.   Neurological: He is alert and oriented to person, place, and time.   Skin: Skin is warm and dry. No lesion and no rash noted.   Psychiatric: His behavior is normal. Mood normal.   Vitals reviewed.       Significant Labs:  All pertinent lab results from the last 24 hours have been reviewed.    Significant Imaging:  Imaging results within the past 24 hours have been reviewed.  Assessment/Plan:     Cardiac/Vascular  Pericarditis  Shekhar Snyder is a 23yo M with PMH of post viral/Influenza A pericarditis, mild intermittent asthma, daily marijuana use, prior tobacco/nicotine use (quit 2-3 mos ago), ADHD, PTSD, depression.    - Pt with acute pericarditis (initially dx 3/2025), suspected to be post viral after Influenza A infection 1 month prior to initial episode  - Pt did not complete full course/tx with high dose NSAIDs and colchicine  - Then found to have large pericardial effusion w/ tamponade requiring pericardiocentesis now (4/11/25)  - Symptoms initially improved, but have not resolved - with persistent chest pain/tightness, dyspnea, and cough  - Now with pleural effusion as well  - Pt with no other current symptoms indicative of a systemic autoimmune rheumatologic process  - Labs showed negative DOM/EMERY profile (DOM was only 1:80)    Plan/Recommendations:  - Agree with acute pericarditis management per cardio with high dose NSAIDs and colchicine  - If symptoms do not resolve and/or pt does develop recurrent episode despite full adequate tx with NSAIDs/colchicine - cardio could consider use of IL-1 inhibitor  - Follow up on ordered/pending lab workup  - Consider obtaining autoinflammatory gene panel especially with known family hx of recurrent pericarditis - would recommend doing an outpatient referral or e-consult to genetics (will see if we can try to do this)  - Pulmonology following and planning for bronch tmrw due  to suspected mucus plugging      Assessment and plan will be discussed further on rounds with supervising attending, Dr. Sena. Please do not hesitate to reach out with any questions or concerns.      Navya Romeo MD  PGY-5, Rheumatology      Acute pericarditis onset 2-4 wks after influenza A  S/p pericardial tamponade  Small left pleural effusion   DOM + 1:80 nucleolar   Anemia  Hypoalbuminemia  + mother with recurrent pericarditis      aldolase Scl-70, RNAP III, centromere, Th/To C3 C4 ANCA PR3 MPO IgG4, immunoglobulins anemia labs Myomarker panel  ACPA  UA UPCR  Continue ibuprofen 800mg three times daily(or naproxen 500mg twice daily or celecoxib 200mg daily and colchicine 0.6 mg twice daily. If no continued improvement, trial of anakinra 100mg sc daily  E-Consult Genetics consult given + family history  NGS sequencing  to analyze favio forTNFRSFIA(TRAPS), MEFV(FMF) and IL B  as recurrent acute pericarditis felt to be auto-inflammatory syndrome and as many as 15% of  cases have genetic risk factor and with his family history, likelihood higher     Philip Sena MD  Rheumatology  [phone]

## 2025-04-16 NOTE — ASSESSMENT & PLAN NOTE
24 y.o. presented to [facility] on 4/11/2025 with a primary complaint of Chest Pain found to have a large recurrent pericardial effusion with tamponade physiology. Discharge was planned for 4/14, but the patient developed hypoxia and imaging revealed a new pleural effusion. Pulmonology was consulted for evaluation and management, including possible thoracentesis. Repeat CXR showing small pleural effusion. Pulmonology team consulted for evaluation.    CT chest 4/15: Moderate left pleural effusion. Mucous plugging of several right lower lobe bronchioles with subsegmental atelectasis.    Plan:    -- aspiration antibiotics considering conscious sedation during pericardiocentesis. Unasyn started.   -- scheduled tomorrow for bronch. NPO at midnight  -- keep bronchodilators at this time  -- Recommend repeat echo with bubble study  -- Bedside US done showing a small pocket of fluid not enough to be drained at this time

## 2025-04-16 NOTE — PROGRESS NOTES
Pharmacist Renal Dose Adjustment Note    Shekhar Snyder is a 24 y.o. male being treated with the medication ampicillin-sulbactam     Patient Data:    Vital Signs (Most Recent):  Temp: 98.1 °F (36.7 °C) (04/16/25 0700)  Pulse: 106 (04/16/25 1315)  Resp: 17 (04/16/25 1315)  BP: 103/61 (04/16/25 1300)  SpO2: 96 % (04/16/25 1315) Vital Signs (72h Range):  Temp:  [97 °F (36.1 °C)-98.9 °F (37.2 °C)]   Pulse:  []   Resp:  [14-42]   BP: ()/(52-91)   SpO2:  [92 %-100 %]      Recent Labs   Lab 04/14/25  0312 04/15/25  0310 04/16/25  0518   CREATININE 0.7 0.6 0.7     Serum creatinine: 0.7 mg/dL 04/16/25 0518  Estimated creatinine clearance: 178.8 mL/min    Ampicillin-sulbactam 1.5g every 6 hrs will be changed to ampicillin-sulbactam 3g every 6 hrs.       Pharmacist's Name: Daniela Stafford  Pharmacist's Extension: [id number]

## 2025-04-16 NOTE — HPI
Mr. Shekhar Snyder is a 24 y.o. male w/ PMH of ADHD, Depression, and PTSD. The patient presented to [facility] on 4/11/2025 with a primary complaint of Chest Pain found to have a large recurrent pericardial effusion with tamponade physiology. The patient initially presented on 2/6/25 to urgent care with Influenza A, manifesting as headache, myalgia, GI symptoms, cough, congestion, and dyspnea on exertion. In early March (3/10-3/12), they were hospitalized for mid-sternal chest pain, dyspnea, and lightheadedness concerning for post-viral pericarditis, managed with NSAIDs and colchicine. An echocardiogram revealed a moderate pericardial effusion without tamponade. NSAIDs were tapered due to GI intolerance, and although symptoms improved, they did not fully resolve. On 4/11/25, outpatient cardiology evaluation noted a large pericardial effusion with tamponade physiology, prompting hospital admission. Pericardiocentesis was performed with removal of 1500cc of serous fluid. The patient was restarted on NSAIDs and colchicine.     Discharge was planned for 4/14, but the patient developed hypoxia and imaging revealed a new pleural effusion. Pulmonology was consulted for evaluation and management, including possible thoracentesis. Repeat CXR showing small pleural effusion. Pulmonology team consulted for evaluation.    Patient evaluated at bedside. Patient states that he gets sharp substernal chest pain on deep inspiration and is currently SOB requiring 3L nasal cannula. Patient states that his shortness of breath is worsened with exertion and laying on his right side. Patient denies fever, cough, headaches, abd pain, nausea, vomiting, diarrhea, constipation.

## 2025-04-16 NOTE — PROGRESS NOTES
Patient admitted to CSU. Patient arrived to floor from ICU no evidence of distress; patient AAO x4 at this time. Patient placed on tele. Vital signs obtained. Patient voices no complaints at this time. Plan of care initiated with patient. Bed in lowest position, locked, SR up x2, call bell in reach. Will continue to monitor patient. Mother at bedside.    04/16/25 1534   Vital Signs   Temp 98.7 °F (37.1 °C)   Temp Source Oral   Pulse (!) 119   Heart Rate Source Monitor   Resp 19   SpO2 (!) 94 %   BP (!) 113/56   MAP (mmHg) 79   BP Location Right arm   Patient Position Sitting

## 2025-04-16 NOTE — NURSING
Telemetry removed so pt can shower (Per MD okay for pt to shower), instructed to call when done to replace telemetry.

## 2025-04-16 NOTE — EICU
Intervention Initiated From:  COR / NARAYANU    Nilda intervened regarding:  Rounding (Video assessment)    VICU Night Rounds Checklist  24H Vital Sign Range:  Temp:  [97 °F (36.1 °C)-98.7 °F (37.1 °C)]   Pulse:  [59-93]   Resp:  [15-27]   BP: ()/(54-82)   SpO2:  [92 %-99 %]     Video rounds and LDA reconciliation

## 2025-04-16 NOTE — MEDICAL/APP STUDENT
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 He was sick for roughly 2 weeks and unable to tolerate physical exertion even in his daily activities. Rx with Tamiflu  3/10/25-3/12/25- Presented with chest pain, dyspnea to ED and found to have acute pericarditis suspected to be post-viral; given ibuprofen 800mg three times daily and colchicine 0,6 mg twice daily  then discharged    Current Hospitalization:  4/11/25-now- Returned to the ED with SOB and chest pain, "felt like he was dying" for 3-4 days prior to ED presentation, but "wanted to sign a lease" for a new apartment then had a cardiology appointment. Presented with pericardial effusion with pericardial tamponade  at outpatient cardiologist then was sent for pericardiocentesis with  interventional cardiology (~ 1.6 L of serous pericardial fluid). Pt was going to be d/c'd but continues to have dyspnea and limited physical activity tolerance. Rheumatology consulted for "Recurrent pericarditis, pleural effusion,  and family hx of pericarditis. Concern for autoimmune process? Connective tissue disorder?"      Past Medical History:   Diagnosis Date    ADHD (attention deficit hyperactivity disorder)     Depression     PTSD (post-traumatic stress disorder)        Family History   Problem Relation Name Age of Onset    Oswald Vinod sequence Brother Shekhar amayalilia     Asthma Brother " Shekhar morillo     Bipolar disorder Maternal Uncle      Alcohol abuse Father      Drug abuse Father      Personality disorder Father      ADD / ADHD Mother      Depression Mother      Physical abuse Paternal Grandmother     Recurrent pericardial effusions- Mother (5 episodes in 3 yrs)    Tobacco Use: High Risk (4/11/2025)    Patient History     Smoking Tobacco Use: Every Day     Smokeless Tobacco Use: Never     Passive Exposure: Never   Patient quit nicotine use 2-3 months ago. Patient was smoking marijauna daily,but has not during this hospitalization and plans to quit in the future.    Objective:    Vital signs for the past 2 hrs:   Pulse Heart Rate Source Resp SpO2 Pulse Oximetry Type Flow (L/min) (Oxygen Therapy) Device (Oxygen Therapy) BP MAP (mmHg) BP Location BP Method Patient Position   04/16/25 0945 109 -- 17 95 % -- -- -- -- -- -- -- --   04/16/25 0915 (!) 118 -- 18 97 % -- -- -- -- -- -- -- --   04/16/25 0900 (!) 117 Monitor;Continuous 20 (!) 94 % Continuous 1 nasal cannula with humidification 126/64 83 Right arm Automatic Lying   04/16/25 0845 (!) 120 -- (!) 28 95 % -- -- -- -- -- -- -- --   04/16/25 0830 101 -- (!) 21 (!) 94 % -- -- -- -- -- -- -- --     Physical Exam  Vitals reviewed.   Constitutional:       General: He is not in acute distress.     Appearance: Normal appearance. He is not toxic-appearing.   HENT:      Head: Normocephalic.   Cardiovascular:      Rate and Rhythm: Normal rate and regular rhythm.      Heart sounds: Normal heart sounds.   Pulmonary:      Breath sounds: Normal breath sounds. No wheezing.   Abdominal:      General: There is no distension.      Palpations: Abdomen is soft.      Tenderness: There is no abdominal tenderness. There is no guarding.   Musculoskeletal:         General: No tenderness. Normal range of motion.      Cervical back: Normal range of motion.   Skin:     General: Skin is warm.      Capillary Refill: Capillary refill takes less than 2 seconds.       Findings: No lesion or rash.   Neurological:      Mental Status: He is alert and oriented to person, place, and time.       Recent Labs   Lab 04/16/25  0518   WBC 11.89   RBC 4.87   HGB 13.8*   HCT 42.1      MCV 86   MCH 28.3   MCHC 32.8       Latest Reference Range & Units 03/11/25 12:43 04/11/25 14:12   Sed Rate <=23 mm/hr 28 (H) 16   (H): Data is abnormally high    Latest Reference Range & Units 04/11/25 18:41   CRP <=8.2 mg/L 146.0 (H)     Imaging:  CT chest w/o contrast- moderate left pleural effusion, severe mucus plugging of several right lower lobe bronchioles with subsegmental atelectasis, small pericardial effusion with improvement when compared to 3/10/25.       Assessment/Plan:      Pericarditis/ recurrent pericardial effusions with tamponade physiology   Improved after pericardiocentesis, will need to evaluate to monitor for recurrence/ worsening of symptoms. Pt initially improved, but symptoms have not resolved.  Pt denies any other current symptoms that would be concerning for a systemic rheumatologic process  -continue ibuprofen 800 mg TID (or Naproxen 500 mg BID or Celecoxib 200 mg QD) and colchicine 0.6 mg BID for acute pericarditis management  -Will review and complete any other lab work to differentiate etiology vs post-viral   RF, CK, immunoglobulins normal   Pending myomarker panel, ANCA, Anti-scleroderma, Anti-centromere, Th/To antibody, RNA Poly antibody, IgG subclass 4, anti-CCP  -Discussed E-consult Genetics w/ patient- amenable at this time      Active Diagnoses:    Diagnosis Date Noted POA    PRINCIPAL PROBLEM:  Pericardial effusion [I31.39] 03/11/2025 Yes    Pleural effusion [J90] 04/15/2025 No    Pericarditis [I31.9] 03/12/2025 Yes      Problems Resolved During this Admission:     VTE Risk Mitigation (From admission, onward)           Ordered     IP VTE HIGH RISK PATIENT  Once         04/11/25 2151     Place sequential compression device  Until discontinued         04/11/25 2151                    Thank you for your consult.  Assessment and plan will be discussed further on rounds with fellow, Dr. Romeo and supervising attending, Dr. Jaida Livingston, MS4

## 2025-04-16 NOTE — CONSULTS
[facility]  Pulmonology  Consult Note    Patient Name: Shekhar Snyder  MRN: [id number]  Admission Date: 4/11/2025  Hospital Length of Stay: 5 days  Code Status: Full Code  Attending Physician: Ernesto Wing MD  Primary Care Provider: Fracisco Phipps MD   Principal Problem: Pericardial effusion    Consults  Subjective:     HPI:  Mr. Shekhar Snyder is a 24 y.o. male w/ PMH of ADHD, Depression, and PTSD. The patient presented to [facility] on 4/11/2025 with a primary complaint of Chest Pain found to have a large recurrent pericardial effusion with tamponade physiology. The patient initially presented on 2/6/25 to urgent care with Influenza A, manifesting as headache, myalgia, GI symptoms, cough, congestion, and dyspnea on exertion. In early March (3/10-3/12), they were hospitalized for mid-sternal chest pain, dyspnea, and lightheadedness concerning for post-viral pericarditis, managed with NSAIDs and colchicine. An echocardiogram revealed a moderate pericardial effusion without tamponade. NSAIDs were tapered due to GI intolerance, and although symptoms improved, they did not fully resolve. On 4/11/25, outpatient cardiology evaluation noted a large pericardial effusion with tamponade physiology, prompting hospital admission. Pericardiocentesis was performed with removal of 1500cc of serous fluid. The patient was restarted on NSAIDs and colchicine.     Discharge was planned for 4/14, but the patient developed hypoxia and imaging revealed a new pleural effusion. Pulmonology was consulted for evaluation and management, including possible thoracentesis. Repeat CXR showing small pleural effusion. Pulmonology team consulted for evaluation.    Patient evaluated at bedside. Patient states that he gets sharp substernal chest pain on deep inspiration and is currently SOB requiring 3L nasal cannula. Patient states that his shortness of breath is worsened with exertion and laying on his right side. Patient  "denies fever, cough, headaches, abd pain, nausea, vomiting, diarrhea, constipation.    Past Medical History:   Diagnosis Date    ADHD (attention deficit hyperactivity disorder)     Depression     PTSD (post-traumatic stress disorder)        Past Surgical History:   Procedure Laterality Date    ADENOIDECTOMY      CIRCUMCISION      OPEN REDUCTION AND INTERNAL FIXATION (ORIF) OF FRACTURE OF CLAVICLE Left 9/20/2022    Procedure: ORIF, FRACTURE, CLAVICLE;  Surgeon: Satya Reynoso IV, MD;  Location: [facility] OR;  Service: Orthopedics;  Laterality: Left;  Beach chair, padded darling stand (no arm krishnamurthy needed), Large C arm come in from head of bed; acumed clavicle system  dian notified 9/19 @ 1015 [facility]    PERICARDIOCENTESIS N/A 4/11/2025    Procedure: Pericardiocentesis;  Surgeon: Ismael Laguna Jr., MD;  Location: [facility] CATH LAB;  Service: Cardiology;  Laterality: N/A;    TONSILLECTOMY      TYMPANOSTOMY TUBE PLACEMENT         Review of patient's allergies indicates:   Allergen Reactions    Omnicef [cefdinir] Edema       Family History       Problem Relation (Age of Onset)    ADD / ADHD Mother    Alcohol abuse Father    Asthma Brother    Bipolar disorder Maternal Uncle    Depression Mother    Drug abuse Father    Personality disorder Father    Physical abuse Paternal Grandmother    Oswald Brock sequence Brother          Tobacco Use    Smoking status: Every Day     Current packs/day: 0.50     Average packs/day: 0.5 packs/day for 5.3 years (2.6 ttl pk-yrs)     Types: Cigarettes, Vaping with nicotine     Start date: 2020     Passive exposure: Never    Smokeless tobacco: Never    Tobacco comments:     Pt states that he started smoking cigarettes and vaping at age 19, but then quit using nicotine in any form 1 month ago when he started feeling ill.  EMR to be updated to reflect "Former Smoker" status when pt remains without relapse as of 2/12//2026.   Substance and Sexual Activity    Alcohol use: No    Drug use: Yes     Types: " Marijuana    Sexual activity: Never         Review of Systems   Constitutional: Negative.  Negative for chills and fever.   HENT: Negative.     Eyes: Negative.  Negative for pain.   Respiratory:  Positive for cough, chest tightness and shortness of breath. Negative for apnea, choking, wheezing and stridor.    Cardiovascular: Negative.  Negative for chest pain, palpitations and leg swelling.   Gastrointestinal: Negative.  Negative for abdominal pain, constipation, diarrhea and nausea.   Endocrine: Negative.    Genitourinary: Negative.  Negative for dysuria.   Musculoskeletal: Negative.    Skin: Negative.    Allergic/Immunologic: Negative.    Neurological: Negative.  Negative for headaches.   Hematological: Negative.    All other systems reviewed and are negative.    Objective:     Vital Signs (Most Recent):  Temp: 98.1 °F (36.7 °C) (04/16/25 0700)  Pulse: 109 (04/16/25 0945)  Resp: 17 (04/16/25 0945)  BP: 126/64 (04/16/25 0900)  SpO2: 95 % (04/16/25 0945) Vital Signs (24h Range):  Temp:  [98.1 °F (36.7 °C)-98.9 °F (37.2 °C)] 98.1 °F (36.7 °C)  Pulse:  [] 109  Resp:  [16-28] 17  SpO2:  [93 %-99 %] 95 %  BP: ()/(52-82) 126/64     Weight: 77 kg (169 lb 12.1 oz)  Body mass index is 21.22 kg/m².      Intake/Output Summary (Last 24 hours) at 4/16/2025 1035  Last data filed at 4/16/2025 0714  Gross per 24 hour   Intake 1090 ml   Output 1350 ml   Net -260 ml        Physical Exam  Vitals and nursing note reviewed.   Constitutional:       General: He is not in acute distress.     Appearance: Normal appearance. He is normal weight. He is not ill-appearing or toxic-appearing.   HENT:      Head: Normocephalic and atraumatic.      Right Ear: Tympanic membrane normal.      Left Ear: Tympanic membrane normal.      Nose: Nose normal.      Mouth/Throat:      Mouth: Mucous membranes are moist.   Eyes:      Pupils: Pupils are equal, round, and reactive to light.   Cardiovascular:      Rate and Rhythm: Normal rate and regular  rhythm.      Pulses: Normal pulses.      Heart sounds: Normal heart sounds.   Musculoskeletal:      Cervical back: Normal range of motion.   Neurological:      Mental Status: He is alert.          Vents:  Oxygen Concentration (%): 32 (04/13/25 0339)    Lines/Drains/Airways       Peripheral Intravenous Line  Duration                  Peripheral IV - Single Lumen 04/14/25 1441 20 G Left Antecubital 1 day                    Significant Labs:    CBC/Anemia Profile:  Recent Labs   Lab 04/15/25  0310 04/16/25  0518   WBC 9.26 11.89   HGB 12.2* 13.8*   HCT 37.5* 42.1    386   MCV 87 86   RDW 13.2 13.3   IRON  --  36*   FERRITIN  --  305.0*   FOLATE  --  10.1   [id number]  --  395        Chemistries:  Recent Labs   Lab 04/15/25  0310 04/16/25  0518    136   K 3.4* 4.3   * 105   CO2 21* 22*   BUN 10 9   CREATININE 0.6 0.7   CALCIUM 7.1* 9.3   ALBUMIN 2.4* 3.1*   BILITOT 0.2 0.4   ALKPHOS 48 69   ALT 22 32   AST 17 25   GLUCOSE 84 95   MG 1.8 2.0       CMP:   Recent Labs   Lab 04/15/25  0310 04/16/25  0518    136   K 3.4* 4.3   * 105   CO2 21* 22*   BUN 10 9   CREATININE 0.6 0.7   CALCIUM 7.1* 9.3   ALBUMIN 2.4* 3.1*   BILITOT 0.2 0.4   ALKPHOS 48 69   AST 17 25   ALT 22 32   ANIONGAP 6* 9       Significant Imaging:   I have reviewed all pertinent imaging results/findings within the past 24 hours.  Assessment/Plan:     Pulmonary  Pleural effusion  24 y.o. presented to [facility] on 4/11/2025 with a primary complaint of Chest Pain found to have a large recurrent pericardial effusion with tamponade physiology. Discharge was planned for 4/14, but the patient developed hypoxia and imaging revealed a new pleural effusion. Pulmonology was consulted for evaluation and management, including possible thoracentesis. Repeat CXR showing small pleural effusion. Pulmonology team consulted for evaluation.    CT chest 4/15: Moderate left pleural effusion. Mucous plugging of several right lower lobe bronchioles  with subsegmental atelectasis.    Plan:    -- aspiration antibiotics considering conscious sedation during pericardiocentesis. Unasyn started.   -- scheduled tomorrow for bronch. NPO at midnight  -- keep bronchodilators at this time  -- Recommend repeat echo with bubble study  -- Bedside US done showing a small pocket of fluid not enough to be drained at this time          Thank you for your consult. I will follow-up with patient. Please contact us if you have any additional questions.     Flakito Porras MD  Pulmonology  [facility]

## 2025-04-16 NOTE — SUBJECTIVE & OBJECTIVE
"Past Medical History:   Diagnosis Date    ADHD (attention deficit hyperactivity disorder)     Depression     PTSD (post-traumatic stress disorder)        Past Surgical History:   Procedure Laterality Date    ADENOIDECTOMY      CIRCUMCISION      OPEN REDUCTION AND INTERNAL FIXATION (ORIF) OF FRACTURE OF CLAVICLE Left 9/20/2022    Procedure: ORIF, FRACTURE, CLAVICLE;  Surgeon: Satya Ryenoso IV, MD;  Location: [facility] OR;  Service: Orthopedics;  Laterality: Left;  Beach chair, padded darling stand (no arm krishnamurthy needed), Large C arm come in from head of bed; acumed clavicle system  dian notified 9/19 @ 1015 [facility]    PERICARDIOCENTESIS N/A 4/11/2025    Procedure: Pericardiocentesis;  Surgeon: Ismael Laguna Jr., MD;  Location: [facility] CATH LAB;  Service: Cardiology;  Laterality: N/A;    TONSILLECTOMY      TYMPANOSTOMY TUBE PLACEMENT         Review of patient's allergies indicates:   Allergen Reactions    Omnicef [cefdinir] Edema       Family History       Problem Relation (Age of Onset)    ADD / ADHD Mother    Alcohol abuse Father    Asthma Brother    Bipolar disorder Maternal Uncle    Depression Mother    Drug abuse Father    Personality disorder Father    Physical abuse Paternal Grandmother    Oswald Brock sequence Brother          Tobacco Use    Smoking status: Every Day     Current packs/day: 0.50     Average packs/day: 0.5 packs/day for 5.3 years (2.6 ttl pk-yrs)     Types: Cigarettes, Vaping with nicotine     Start date: 2020     Passive exposure: Never    Smokeless tobacco: Never    Tobacco comments:     Pt states that he started smoking cigarettes and vaping at age 19, but then quit using nicotine in any form 1 month ago when he started feeling ill.  EMR to be updated to reflect "Former Smoker" status when pt remains without relapse as of 2/12//2026.   Substance and Sexual Activity    Alcohol use: No    Drug use: Yes     Types: Marijuana    Sexual activity: Never         Review of Systems   Constitutional: Negative.  " Negative for chills and fever.   HENT: Negative.     Eyes: Negative.  Negative for pain.   Respiratory:  Positive for cough, chest tightness and shortness of breath. Negative for apnea, choking, wheezing and stridor.    Cardiovascular: Negative.  Negative for chest pain, palpitations and leg swelling.   Gastrointestinal: Negative.  Negative for abdominal pain, constipation, diarrhea and nausea.   Endocrine: Negative.    Genitourinary: Negative.  Negative for dysuria.   Musculoskeletal: Negative.    Skin: Negative.    Allergic/Immunologic: Negative.    Neurological: Negative.  Negative for headaches.   Hematological: Negative.    All other systems reviewed and are negative.    Objective:     Vital Signs (Most Recent):  Temp: 98.1 °F (36.7 °C) (04/16/25 0700)  Pulse: 109 (04/16/25 0945)  Resp: 17 (04/16/25 0945)  BP: 126/64 (04/16/25 0900)  SpO2: 95 % (04/16/25 0945) Vital Signs (24h Range):  Temp:  [98.1 °F (36.7 °C)-98.9 °F (37.2 °C)] 98.1 °F (36.7 °C)  Pulse:  [] 109  Resp:  [16-28] 17  SpO2:  [93 %-99 %] 95 %  BP: ()/(52-82) 126/64     Weight: 77 kg (169 lb 12.1 oz)  Body mass index is 21.22 kg/m².      Intake/Output Summary (Last 24 hours) at 4/16/2025 1035  Last data filed at 4/16/2025 0714  Gross per 24 hour   Intake 1090 ml   Output 1350 ml   Net -260 ml        Physical Exam  Vitals and nursing note reviewed.   Constitutional:       General: He is not in acute distress.     Appearance: Normal appearance. He is normal weight. He is not ill-appearing or toxic-appearing.   HENT:      Head: Normocephalic and atraumatic.      Right Ear: Tympanic membrane normal.      Left Ear: Tympanic membrane normal.      Nose: Nose normal.      Mouth/Throat:      Mouth: Mucous membranes are moist.   Eyes:      Pupils: Pupils are equal, round, and reactive to light.   Cardiovascular:      Rate and Rhythm: Normal rate and regular rhythm.      Pulses: Normal pulses.      Heart sounds: Normal heart sounds.    Musculoskeletal:      Cervical back: Normal range of motion.   Neurological:      Mental Status: He is alert.          Vents:  Oxygen Concentration (%): 32 (04/13/25 0339)    Lines/Drains/Airways       Peripheral Intravenous Line  Duration                  Peripheral IV - Single Lumen 04/14/25 1441 20 G Left Antecubital 1 day                    Significant Labs:    CBC/Anemia Profile:  Recent Labs   Lab 04/15/25  0310 04/16/25  0518   WBC 9.26 11.89   HGB 12.2* 13.8*   HCT 37.5* 42.1    386   MCV 87 86   RDW 13.2 13.3   IRON  --  36*   FERRITIN  --  305.0*   FOLATE  --  10.1   [id number]  --  395        Chemistries:  Recent Labs   Lab 04/15/25  0310 04/16/25  0518    136   K 3.4* 4.3   * 105   CO2 21* 22*   BUN 10 9   CREATININE 0.6 0.7   CALCIUM 7.1* 9.3   ALBUMIN 2.4* 3.1*   BILITOT 0.2 0.4   ALKPHOS 48 69   ALT 22 32   AST 17 25   GLUCOSE 84 95   MG 1.8 2.0       CMP:   Recent Labs   Lab 04/15/25  0310 04/16/25  0518    136   K 3.4* 4.3   * 105   CO2 21* 22*   BUN 10 9   CREATININE 0.6 0.7   CALCIUM 7.1* 9.3   ALBUMIN 2.4* 3.1*   BILITOT 0.2 0.4   ALKPHOS 48 69   AST 17 25   ALT 22 32   ANIONGAP 6* 9       Significant Imaging:   I have reviewed all pertinent imaging results/findings within the past 24 hours.

## 2025-04-16 NOTE — PLAN OF CARE
Plan of care discussed with patient. Patient is free of fall/trauma/injury. Denies CP or pain/discomfort. Pt reports MCCALLUM, sats WNL. VSS. AAOx4. Mother at bedside. NPO at midnight for bronchoscopy. All questions addressed. Will continue to monitor  Problem: Adult Inpatient Plan of Care  Goal: Plan of Care Review  Outcome: Progressing  Goal: Patient-Specific Goal (Individualized)  Outcome: Progressing  Goal: Absence of Hospital-Acquired Illness or Injury  Outcome: Progressing  Goal: Optimal Comfort and Wellbeing  Outcome: Progressing  Goal: Readiness for Transition of Care  Outcome: Progressing

## 2025-04-16 NOTE — ANESTHESIA PREPROCEDURE EVALUATION
[facility]  Anesthesia Pre-Operative Evaluation         Patient Name: Shekhar Snyder  YOB: 2000  MRN: [id number]    SUBJECTIVE:     Pre-operative evaluation for Procedure(s) (LRB):  Bronchoscopy (N/A)     04/16/2025    Shekhar Snyder is a 24 y.o. male with a history of ADHD, depression, and PTSD who was admitted with pericarditis and pericardial effusion with tamponade physiology. S/P pericardiocentesis on 4/11/2025 with removal of 1500cc. Hospital course complicated by pleural reaction atelectasis and/or consolidation change in left lower lobe.     Patients mother at bedside has had swelling with general anesthetics in the past, has not had with propofol. Requesting TIVA for son tomorrow if possible.     Patient now presents for the above procedure(s).    Results for orders placed during the hospital encounter of 03/10/25  Interpretation Summary    Left Ventricle: The left ventricle is normal in size. Normal wall thickness. Normal wall motion. There is normal systolic function with a visually estimated ejection fraction of 55 - 60%. Quantitated ejection fraction is 59%. There is normal diastolic function.    Right Ventricle: The right ventricle has mild enlargement. Systolic function is normal.    Left Atrium: Normal left atrial size.    Aortic Valve: The aortic valve is a trileaflet valve. There is no significant regurgitation.    Mitral Valve: There is trace regurgitation.    Tricuspid Valve: There is trace regurgitation.    Pulmonary Artery: The estimated pulmonary artery systolic pressure is 28 mmHg.    IVC/SVC: Normal venous pressure at 3 mmHg.    Pericardium: There is a moderate effusion. No indication of cardiac tamponade. Evidence includes no chamber collapse.  There is some respiratory variability of mitral and tricuspid inflow velocities not meeting criteria for tamponade physiology.  IVC collapsible and not dilated        LDA:       Peripheral IV - Single Lumen 04/14/25  1441 20 G Left Antecubital (Active)   Site Assessment Clean;Dry;Intact;No swelling;No redness 04/16/25 1101   Line Securement Device Secured with sutureless device 04/16/25 1101   Extremity Assessment Distal to IV No redness;No abnormal discoloration;No swelling;No warmth 04/16/25 1101   Line Status Flushed;Blood return noted 04/16/25 1101   Dressing Status Clean;Dry;Intact 04/16/25 1101   Dressing Intervention Integrity maintained 04/16/25 1101   Dressing Change Due 04/18/25 04/16/25 1101   Site Change Due 04/18/25 04/16/25 1101   Reason Not Rotated Not due 04/16/25 1101   Number of days: 1       Prev airway:      Intubation     Date/Time: 9/20/2022 11:55 AM  Performed by: Sasha Cao CRNA  Authorized by: Domitila Machado MD      Intubation:     Induction:  Intravenous    Intubated:  Postinduction    Mask Ventilation:  Easy mask    Attempts:  1    Attempted By:  Student (Fabi Moraes)    Method of Intubation:  Direct    Blade:  Neil 3    Laryngeal View Grade: Grade I - full view of cords      Difficult Airway Encountered?: No      Complications:  None    Airway Device:  Oral endotracheal tube    Airway Device Size:  7.5    Style/Cuff Inflation:  Cuffed (inflated to minimal occlusive pressure)    Tube secured:  23    Secured at:  The lips    Placement Verified By:  Capnometry    Complicating Factors:  None    Findings Post-Intubation:  BS equal bilateral and atraumatic/condition of teeth unchanged          Drips: None documented.      Problem List[1]    Review of patient's allergies indicates:   Allergen Reactions    Omnicef [cefdinir] Edema       Current Inpatient Medications:   albuterol sulfate  2.5 mg Nebulization Q8H    colchicine  0.6 mg Oral BID    ibuprofen  800 mg Oral Q8H    LIDOcaine  1 patch Transdermal Q24H    pantoprazole  40 mg Oral Daily       Medications Ordered Prior to Encounter[2]    Past Surgical History:   Procedure Laterality Date    ADENOIDECTOMY      CIRCUMCISION      OPEN  REDUCTION AND INTERNAL FIXATION (ORIF) OF FRACTURE OF CLAVICLE Left 9/20/2022    Procedure: ORIF, FRACTURE, CLAVICLE;  Surgeon: Satya Reynoso IV, MD;  Location: [facility] OR;  Service: Orthopedics;  Laterality: Left;  Beach chair, padded darling stand (no arm krishnamurthy needed), Large C arm come in from head of bed; acumed clavicle system  dian notified 9/19 @ [facility]    PERICARDIOCENTESIS N/A 4/11/2025    Procedure: Pericardiocentesis;  Surgeon: Ismael Laguna Jr., MD;  Location: [facility] CATH LAB;  Service: Cardiology;  Laterality: N/A;    TONSILLECTOMY      TYMPANOSTOMY TUBE PLACEMENT         Social History[3]    OBJECTIVE:     Vital Signs Range (Last 24H):  Temp:  [36.7 °C (98.1 °F)-37.2 °C (98.9 °F)]   Pulse:  []   Resp:  [14-28]   BP: ()/(52-82)   SpO2:  [93 %-99 %]       Significant Labs:  Lab Results   Component Value Date    WBC 11.89 04/16/2025    HGB 13.8 (L) 04/16/2025    HCT 42.1 04/16/2025     04/16/2025    ALT 32 04/16/2025    AST 25 04/16/2025     04/16/2025    K 4.3 04/16/2025     04/16/2025    CREATININE 0.7 04/16/2025    BUN 9 04/16/2025    CO2 22 (L) 04/16/2025    TSH 1.517 03/11/2025    INR 1.0 09/14/2022       Diagnostic Studies: No relevant studies.    EKG:   Results for orders placed or performed during the hospital encounter of 04/11/25   EKG 12-LEAD    Collection Time: 04/12/25  8:30 AM   Result Value Ref Range    QRS Duration 98 ms    OHS QTC Calculation 460 ms    Narrative    Test Reason : I31.39,    Vent. Rate :  78 BPM     Atrial Rate :  78 BPM     P-R Int : 144 ms          QRS Dur :  98 ms      QT Int : 404 ms       P-R-T Axes :  53  58  48 degrees    QTcB Int : 460 ms    Normal sinus rhythm with sinus arrhythmia  Incomplete right bundle branch block  Borderline Abnormal ECG  When compared with ECG of 11-Apr-2025 19:12,  Incomplete right bundle branch block has replaced right ventrical  conduction delay  Confirmed by Vipin Camacho ([id number]) on 4/13/2025 9:23:44  "AM    Referred By: AAAREFERRAL SELF           Confirmed By: Vipin Camacho       2D ECHO:  TTE:  Results for orders placed or performed during the hospital encounter of 03/10/25   Echo   Result Value Ref Range    BSA 1.83 m2    Johnson's Biplane MOD Ejection Fraction 59 %    A2C EF 50 %    A4C EF 68 %    LVOT stroke volume 81.4 cm3    LVIDd 4.8 3.5 - 6.0 cm    LV Systolic Volume 42 mL    LV Systolic Volume Index 22.5 mL/m2    LVIDs 3.2 2.1 - 4.0 cm    LV ESV A2C 36.26 mL    LV Diastolic Volume 108 mL    LV ESV A4C 35.04 mL    LV Diastolic Volume Index 57.75 mL/m2    LV EDV A2C 88.740461564648191 mL    LV EDV A4C 92.06 mL    Left Ventricular End Systolic Volume by Teichholz Method 41.98 mL    Left Ventricular End Diastolic Volume by Teichholz Method 108.46 mL    IVS 1.0 0.6 - 1.1 cm    LVOT diameter 2.1 cm    LVOT area 3.5 cm2    FS 33.3 28 - 44 %    Left Ventricle Relative Wall Thickness 0.42 cm    PW 1.0 0.6 - 1.1 cm    LV mass 170.2 g    LV Mass Index 91.0 g/m2    MV Peak E Jaden 1.06 m/s    TDI LATERAL 0.08 m/s    TDI SEPTAL 0.09 m/s    E/E' ratio 12 m/s    MV Peak A Jaden 0.68 m/s    TR Max Jaden 2.5 m/s    E/A ratio 1.56     E wave deceleration time 241 msec    MV "A" wave duration [id number] msec    LV SEPTAL E/E' RATIO 11.8 m/s    LV LATERAL E/E' RATIO 13.3 m/s    PV Peak S Jaden 0.40 m/s    PV Peak D Jaden 0.69 m/s    Pulm vein S/D ratio 0.58     LVOT peak jaden 1.2 m/s    Left Ventricular Outflow Tract Mean Velocity 0.77 cm/s    Left Ventricular Outflow Tract Mean Gradient 2.90 mmHg    RV- cai basal diam 4.1 cm    RV S' 12.10 cm/s    TAPSE 2.41 cm    RV/LV Ratio 0.85 cm    LA Vol (MOD) 37 mL    DENZEL (MOD) 20 mL/m2    RA area length vol 30.58 mL    RA Area 12.6 cm2    RA Vol 31.82 mL    AV mean gradient 4 mmHg    AV peak gradient 8 mmHg    Ao peak jaden 1.4 m/s    Ao VTI 27.1 cm    LVOT peak VTI 23.5 cm    AV valve area 3.0 cm²    AV Velocity Ratio 0.86     AV index (prosthetic) 0.87     SAM by Velocity Ratio " 3.0 cm²    Mr max keisha 2.05 m/s    MV peak gradient 4 mmHg    MV stenosis pressure 1/2 time 70.01 ms    MV valve area p 1/2 method 3.14 cm2    MV valve area by continuity eq 3.65 cm2    MV VTI 22.3 cm    Triscuspid Valve Regurgitation Peak Gradient 26 mmHg    PV PEAK VELOCITY 0.99 m/s    PV peak gradient 4 mmHg    Pulmonary Valve Mean Velocity 0.80 m/s    Sinus 3.17 cm    STJ 2.56 cm    Ascending aorta 2.98 cm    IVC diameter 2.40 cm    Mean e' 0.09 m/s    ZLVIDS -0.04     ZLVIDD -0.82     LA area A4C 14.55 cm2    LA area A2C 15.02 cm2    TV resting pulmonary artery pressure 28 mmHg    RV TB RVSP 6 mmHg    Est. RA pres 3 mmHg    Narrative      Left Ventricle: The left ventricle is normal in size. Normal wall   thickness. Normal wall motion. There is normal systolic function with a   visually estimated ejection fraction of 55 - 60%. Quantitated ejection   fraction is 59%. There is normal diastolic function.    Right Ventricle: The right ventricle has mild enlargement. Systolic   function is normal.    Left Atrium: Normal left atrial size.    Aortic Valve: The aortic valve is a trileaflet valve. There is no   significant regurgitation.    Mitral Valve: There is trace regurgitation.    Tricuspid Valve: There is trace regurgitation.    Pulmonary Artery: The estimated pulmonary artery systolic pressure is   28 mmHg.    IVC/SVC: Normal venous pressure at 3 mmHg.    Pericardium: There is a moderate effusion. No indication of cardiac   tamponade. Evidence includes no chamber collapse.         TENISHA:  No results found for this or any previous visit.    ASSESSMENT/PLAN:         Pre-op Assessment     I have reviewed the Nursing Notes.    I have reviewed the Medications.     Review of Systems  Anesthesia Hx:  No problems with previous Anesthesia             Denies Family Hx of Anesthesia complications.    Denies Personal Hx of Anesthesia complications.                    Social:  Non-Smoker, No Alcohol Use        Hematology/Oncology:  Hematology Normal   Oncology Normal                                   EENT/Dental:  EENT/Dental Normal           Cardiovascular:  Exercise tolerance: good               MCCALLUM   Admitted with pericarditis and pericardial effusion now s/p 1.5L drained.                           Pulmonary:      Shortness of breath   pleural reaction atelectasis and/or consolidation change in left lower lobe.                Renal/:  Renal/ Normal                 Hepatic/GI:  Hepatic/GI Normal                    Musculoskeletal:  Musculoskeletal Normal                Neurological:  Neurology Normal                                      Endocrine:  Endocrine Normal            Psych:  Psychiatric History  depression                Physical Exam  General: Well nourished, Cooperative, Alert and Oriented    Airway:  Mallampati: II / I  Mouth Opening: Normal  TM Distance: Normal  Tongue: Normal  Neck ROM: Normal ROM    Dental:  Intact    Chest/Lungs:  Normal Respiratory Rate    Heart:  Rate: Normal        Anesthesia Plan  Type of Anesthesia, risks & benefits discussed:    Anesthesia Type: Gen ETT  Intra-op Monitoring Plan: Standard ASA Monitors  Post Op Pain Control Plan: multimodal analgesia and IV/PO Opioids PRN  Induction:  IV  Airway Plan: Video and Direct, Post-Induction  Informed Consent: Informed consent signed with the Patient and all parties understand the risks and agree with anesthesia plan.  All questions answered.   ASA Score: 2  Day of Surgery Review of History & Physical: H&P Update referred to the surgeon/provider.    Ready For Surgery From Anesthesia Perspective.     .           [1]   Patient Active Problem List  Diagnosis    Reading disorder    Mild early onset dysthymic disorder, in full remission, with anxious distress, with pure dysthymic syndrome    Attention deficit hyperactivity disorder (ADHD), predominantly inattentive type    Acute pain of right shoulder    Closed displaced fracture of shaft  "of left clavicle    Pericardial effusion    Chest pain    Former smoker    Pericarditis    Pleural effusion   [2]   No current facility-administered medications on file prior to encounter.     Current Outpatient Medications on File Prior to Encounter   Medication Sig Dispense Refill    albuterol (VENTOLIN HFA) 90 mcg/actuation inhaler Inhale 2 puffs into the lungs every 6 (six) hours as needed for Wheezing. Rescue      colchicine (COLCRYS) 0.6 mg tablet Take 1 tablet (0.6 mg total) by mouth 2 (two) times daily. 60 tablet 6    pantoprazole (PROTONIX) 40 MG tablet Take 1 tablet (40 mg total) by mouth once daily. 30 tablet 2   [3]   Social History  Socioeconomic History    Marital status: Single   Tobacco Use    Smoking status: Every Day     Current packs/day: 0.50     Average packs/day: 0.5 packs/day for 5.3 years (2.6 ttl pk-yrs)     Types: Cigarettes, Vaping with nicotine     Start date: 2020     Passive exposure: Never    Smokeless tobacco: Never    Tobacco comments:     Pt states that he started smoking cigarettes and vaping at age 19, but then quit using nicotine in any form 1 month ago when he started feeling ill.  EMR to be updated to reflect "Former Smoker" status when pt remains without relapse as of 2/12//2026.   Substance and Sexual Activity    Alcohol use: No    Drug use: Yes     Types: Marijuana    Sexual activity: Never   Social History Narrative    Lives at home with Mom and Brother, Going to [employer], will be in 6th grade.  One dog at home.     Social Drivers of Health     Financial Resource Strain: Low Risk  (4/13/2025)    Overall Financial Resource Strain (CARDIA)     Difficulty of Paying Living Expenses: Not hard at all   Food Insecurity: No Food Insecurity (4/13/2025)    Hunger Vital Sign     Worried About Running Out of Food in the Last Year: Never true     Ran Out of Food in the Last Year: Never true   Transportation Needs: No Transportation Needs (4/13/2025)    PRAPARE - Transportation     " Lack of Transportation (Medical): No     Lack of Transportation (Non-Medical): No   Physical Activity: Sufficiently Active (4/13/2025)    Exercise Vital Sign     Days of Exercise per Week: 5 days     Minutes of Exercise per Session: 50 min   Stress: No Stress Concern Present (4/13/2025)    [place] [employer] - Occupational Stress Questionnaire     Feeling of Stress : Not at all   Housing Stability: Low Risk  (4/13/2025)    Housing Stability Vital Sign     Unable to Pay for Housing in the Last Year: No     Homeless in the Last Year: No

## 2025-04-17 ENCOUNTER — ANESTHESIA (OUTPATIENT)
Dept: SURGERY | Facility: HOSPITAL | Age: 25
End: 2025-04-17
Payer: COMMERCIAL

## 2025-04-17 DIAGNOSIS — I30.9 ACUTE PERICARDITIS, UNSPECIFIED TYPE: Primary | ICD-10-CM

## 2025-04-17 LAB
ABSOLUTE EOSINOPHIL (OHS): 0.38 K/UL
ABSOLUTE MONOCYTE (OHS): 1.33 K/UL (ref 0.3–1)
ABSOLUTE NEUTROPHIL COUNT (OHS): 9.98 K/UL (ref 1.8–7.7)
ALBUMIN SERPL BCP-MCNC: 3 G/DL (ref 3.5–5.2)
ALP SERPL-CCNC: 70 UNIT/L (ref 40–150)
ALT SERPL W/O P-5'-P-CCNC: 24 UNIT/L (ref 10–44)
ANION GAP (OHS): 8 MMOL/L (ref 8–16)
APTT PPP: 26.7 SECONDS (ref 21–32)
AST SERPL-CCNC: 15 UNIT/L (ref 11–45)
AV AREA BY CONTINUOUS VTI: 3.3 CM2
AV INDEX (PROSTH): 1.04
AV LVOT MEAN GRADIENT: 3 MMHG
AV LVOT PEAK GRADIENT: 6 MMHG
AV MEAN GRADIENT: 3 MMHG
AV PEAK GRADIENT: 5 MMHG
AV VALVE AREA BY VELOCITY RATIO: 3.4 CM²
AV VALVE AREA: 3.3 CM2
AV VELOCITY RATIO: 1.09
BASOPHILS # BLD AUTO: 0.05 K/UL
BASOPHILS NFR BLD AUTO: 0.4 %
BILIRUB SERPL-MCNC: 0.5 MG/DL (ref 0.1–1)
BILIRUB UR QL STRIP.AUTO: NEGATIVE
BSA FOR ECHO PROCEDURE: 2.01 M2
BUN SERPL-MCNC: 11 MG/DL (ref 6–20)
CALCIUM SERPL-MCNC: 8.7 MG/DL (ref 8.7–10.5)
CENTROMERE IGG SER-ACNC: <0.2 U
CHLORIDE SERPL-SCNC: 106 MMOL/L (ref 95–110)
CLARITY UR: CLEAR
CO2 SERPL-SCNC: 24 MMOL/L (ref 23–29)
COLOR UR AUTO: YELLOW
CREAT SERPL-MCNC: 0.7 MG/DL (ref 0.5–1.4)
CREAT UR-MCNC: 151 MG/DL (ref 23–375)
CRP SERPL-MCNC: 81.06 MG/L
CV ECHO LV RWT: 0.49 CM
DOP CALC AO PEAK VEL: 1.1 M/S
DOP CALC AO VTI: 18.5 CM
DOP CALC LVOT AREA: 3.1 CM2
DOP CALC LVOT DIAMETER: 2 CM
DOP CALC LVOT PEAK VEL: 1.2 M/S
DOP CALC LVOT STROKE VOLUME: 60.3 CM3
DOP CALCLVOT PEAK VEL VTI: 19.2 CM
E WAVE DECELERATION TIME: 289 MS
E WAVE DECELERATION TIME: 294 MS
E/A RATIO: 1.44
E/E' RATIO: 8 M/S
ECHO EF ESTIMATED: 66 %
ECHO LV POSTERIOR WALL: 1 CM (ref 0.6–1.1)
ERYTHROCYTE [DISTWIDTH] IN BLOOD BY AUTOMATED COUNT: 13.2 % (ref 11.5–14.5)
FRACTIONAL SHORTENING: 36.6 % (ref 28–44)
GFR SERPLBLD CREATININE-BSD FMLA CKD-EPI: >60 ML/MIN/1.73/M2
GLUCOSE SERPL-MCNC: 95 MG/DL (ref 70–110)
GLUCOSE UR QL STRIP: NEGATIVE
HCT VFR BLD AUTO: 40.8 % (ref 40–54)
HGB BLD-MCNC: 13.3 GM/DL (ref 14–18)
HGB UR QL STRIP: NEGATIVE
IMM GRANULOCYTES # BLD AUTO: 0.04 K/UL (ref 0–0.04)
IMM GRANULOCYTES NFR BLD AUTO: 0.3 % (ref 0–0.5)
INTERVENTRICULAR SEPTUM: 1 CM (ref 0.6–1.1)
KETONES UR QL STRIP: NEGATIVE
LA MAJOR: 5.5 CM
LA MINOR: 4.5 CM
LA WIDTH: 3.9 CM
LEFT ATRIUM SIZE: 2.6 CM
LEFT ATRIUM VOLUME INDEX: 21 ML/M2
LEFT ATRIUM VOLUME: 43 CM3
LEFT INTERNAL DIMENSION IN SYSTOLE: 2.6 CM (ref 2.1–4)
LEFT VENTRICLE DIASTOLIC VOLUME INDEX: 35.29 ML/M2
LEFT VENTRICLE DIASTOLIC VOLUME: 72 ML
LEFT VENTRICLE MASS INDEX: 64.8 G/M2
LEFT VENTRICLE SYSTOLIC VOLUME INDEX: 12.3 ML/M2
LEFT VENTRICLE SYSTOLIC VOLUME: 25 ML
LEFT VENTRICULAR INTERNAL DIMENSION IN DIASTOLE: 4.1 CM (ref 3.5–6)
LEFT VENTRICULAR MASS: 132.1 G
LEUKOCYTE ESTERASE UR QL STRIP: NEGATIVE
LV LATERAL E/E' RATIO: 9.1
LV SEPTAL E/E' RATIO: 7.6
LYMPHOCYTES # BLD AUTO: 2.19 K/UL (ref 1–4.8)
MAGNESIUM SERPL-MCNC: 1.9 MG/DL (ref 1.6–2.6)
MCH RBC QN AUTO: 28.2 PG (ref 27–31)
MCHC RBC AUTO-ENTMCNC: 32.6 G/DL (ref 32–36)
MCV RBC AUTO: 86 FL (ref 82–98)
MV PEAK A VEL: 0.63 M/S
MV PEAK E VEL: 0.91 M/S
NITRITE UR QL STRIP: NEGATIVE
NUCLEATED RBC (/100WBC) (OHS): 0 /100 WBC
OHS CV RV/LV RATIO: 0.59 CM
PH UR STRIP: 6 [PH]
PLATELET # BLD AUTO: 378 K/UL (ref 150–450)
PMV BLD AUTO: 10.8 FL (ref 9.2–12.9)
POCT GLUCOSE: 122 MG/DL (ref 70–110)
POTASSIUM SERPL-SCNC: 4 MMOL/L (ref 3.5–5.1)
PROT SERPL-MCNC: 6.7 GM/DL (ref 6–8.4)
PROT UR QL STRIP: NEGATIVE
PROT UR-MCNC: 9 MG/DL
PROT/CREAT UR: 0.06 MG/G{CREAT}
RA MAJOR: 4.12 CM
RA PRESSURE ESTIMATED: 3 MMHG
RA WIDTH: 2.75 CM
RBC # BLD AUTO: 4.72 M/UL (ref 4.6–6.2)
RELATIVE EOSINOPHIL (OHS): 2.7 %
RELATIVE LYMPHOCYTE (OHS): 15.7 % (ref 18–48)
RELATIVE MONOCYTE (OHS): 9.5 % (ref 4–15)
RELATIVE NEUTROPHIL (OHS): 71.4 % (ref 38–73)
RIGHT VENTRICLE DIASTOLIC BASEL DIMENSION: 2.4 CM
SINUS: 2.72 CM
SODIUM SERPL-SCNC: 138 MMOL/L (ref 136–145)
SP GR UR STRIP: 1.02
STJ: 2.56 CM
STRONGYLOIDES IGG SER QL IA: NEGATIVE
TDI LATERAL: 0.1 M/S
TDI SEPTAL: 0.12 M/S
TDI: 0.11 M/S
TRICUSPID ANNULAR PLANE SYSTOLIC EXCURSION: 1.54 CM
UROBILINOGEN UR STRIP-ACNC: NEGATIVE EU/DL
WBC # BLD AUTO: 13.97 K/UL (ref 3.9–12.7)
Z-SCORE OF LEFT VENTRICULAR DIMENSION IN END DIASTOLE: -3.94
Z-SCORE OF LEFT VENTRICULAR DIMENSION IN END SYSTOLE: -2.85

## 2025-04-17 PROCEDURE — 86141 C-REACTIVE PROTEIN HS: CPT | Performed by: STUDENT IN AN ORGANIZED HEALTH CARE EDUCATION/TRAINING PROGRAM

## 2025-04-17 PROCEDURE — 81003 URINALYSIS AUTO W/O SCOPE: CPT | Performed by: STUDENT IN AN ORGANIZED HEALTH CARE EDUCATION/TRAINING PROGRAM

## 2025-04-17 PROCEDURE — 37000009 HC ANESTHESIA EA ADD 15 MINS: Performed by: EMERGENCY MEDICINE

## 2025-04-17 PROCEDURE — 37000008 HC ANESTHESIA 1ST 15 MINUTES: Performed by: EMERGENCY MEDICINE

## 2025-04-17 PROCEDURE — 63600175 PHARM REV CODE 636 W HCPCS

## 2025-04-17 PROCEDURE — 63600175 PHARM REV CODE 636 W HCPCS: Performed by: EMERGENCY MEDICINE

## 2025-04-17 PROCEDURE — 99231 SBSQ HOSP IP/OBS SF/LOW 25: CPT | Mod: ,,, | Performed by: INTERNAL MEDICINE

## 2025-04-17 PROCEDURE — 99900035 HC TECH TIME PER 15 MIN (STAT)

## 2025-04-17 PROCEDURE — 31622 DX BRONCHOSCOPE/WASH: CPT | Mod: ,,, | Performed by: EMERGENCY MEDICINE

## 2025-04-17 PROCEDURE — 85730 THROMBOPLASTIN TIME PARTIAL: CPT | Performed by: STUDENT IN AN ORGANIZED HEALTH CARE EDUCATION/TRAINING PROGRAM

## 2025-04-17 PROCEDURE — 25000003 PHARM REV CODE 250: Performed by: STUDENT IN AN ORGANIZED HEALTH CARE EDUCATION/TRAINING PROGRAM

## 2025-04-17 PROCEDURE — 85025 COMPLETE CBC W/AUTO DIFF WBC: CPT | Performed by: STUDENT IN AN ORGANIZED HEALTH CARE EDUCATION/TRAINING PROGRAM

## 2025-04-17 PROCEDURE — 83735 ASSAY OF MAGNESIUM: CPT | Performed by: STUDENT IN AN ORGANIZED HEALTH CARE EDUCATION/TRAINING PROGRAM

## 2025-04-17 PROCEDURE — 99233 SBSQ HOSP IP/OBS HIGH 50: CPT | Mod: ,,, | Performed by: INTERNAL MEDICINE

## 2025-04-17 PROCEDURE — 71000016 HC POSTOP RECOV ADDL HR: Performed by: EMERGENCY MEDICINE

## 2025-04-17 PROCEDURE — 84156 ASSAY OF PROTEIN URINE: CPT | Performed by: STUDENT IN AN ORGANIZED HEALTH CARE EDUCATION/TRAINING PROGRAM

## 2025-04-17 PROCEDURE — 0B9J8ZZ DRAINAGE OF LEFT LOWER LUNG LOBE, VIA NATURAL OR ARTIFICIAL OPENING ENDOSCOPIC: ICD-10-PCS | Performed by: EMERGENCY MEDICINE

## 2025-04-17 PROCEDURE — 36415 COLL VENOUS BLD VENIPUNCTURE: CPT | Performed by: STUDENT IN AN ORGANIZED HEALTH CARE EDUCATION/TRAINING PROGRAM

## 2025-04-17 PROCEDURE — 63600175 PHARM REV CODE 636 W HCPCS: Performed by: INTERNAL MEDICINE

## 2025-04-17 PROCEDURE — 94640 AIRWAY INHALATION TREATMENT: CPT

## 2025-04-17 PROCEDURE — 99233 SBSQ HOSP IP/OBS HIGH 50: CPT | Mod: 25,,, | Performed by: EMERGENCY MEDICINE

## 2025-04-17 PROCEDURE — 25000003 PHARM REV CODE 250

## 2025-04-17 PROCEDURE — 25000003 PHARM REV CODE 250: Performed by: INTERNAL MEDICINE

## 2025-04-17 PROCEDURE — 25000242 PHARM REV CODE 250 ALT 637 W/ HCPCS: Performed by: STUDENT IN AN ORGANIZED HEALTH CARE EDUCATION/TRAINING PROGRAM

## 2025-04-17 PROCEDURE — 20600001 HC STEP DOWN PRIVATE ROOM

## 2025-04-17 PROCEDURE — 94761 N-INVAS EAR/PLS OXIMETRY MLT: CPT

## 2025-04-17 PROCEDURE — 27000221 HC OXYGEN, UP TO 24 HOURS

## 2025-04-17 PROCEDURE — 36000707: Performed by: EMERGENCY MEDICINE

## 2025-04-17 PROCEDURE — 27201423 OPTIME MED/SURG SUP & DEVICES STERILE SUPPLY: Performed by: EMERGENCY MEDICINE

## 2025-04-17 PROCEDURE — 87205 SMEAR GRAM STAIN: CPT | Performed by: EMERGENCY MEDICINE

## 2025-04-17 PROCEDURE — 36000706: Performed by: EMERGENCY MEDICINE

## 2025-04-17 PROCEDURE — 71000015 HC POSTOP RECOV 1ST HR: Performed by: EMERGENCY MEDICINE

## 2025-04-17 PROCEDURE — 80053 COMPREHEN METABOLIC PANEL: CPT | Performed by: STUDENT IN AN ORGANIZED HEALTH CARE EDUCATION/TRAINING PROGRAM

## 2025-04-17 PROCEDURE — 71000033 HC RECOVERY, INTIAL HOUR: Performed by: EMERGENCY MEDICINE

## 2025-04-17 RX ORDER — HALOPERIDOL LACTATE 5 MG/ML
0.5 INJECTION, SOLUTION INTRAMUSCULAR EVERY 10 MIN PRN
Status: DISCONTINUED | OUTPATIENT
Start: 2025-04-17 | End: 2025-04-19

## 2025-04-17 RX ORDER — GLUCAGON 1 MG
1 KIT INJECTION
Status: DISCONTINUED | OUTPATIENT
Start: 2025-04-17 | End: 2025-04-21 | Stop reason: HOSPADM

## 2025-04-17 RX ORDER — OXYCODONE HYDROCHLORIDE 5 MG/1
5 TABLET ORAL
Refills: 0 | Status: DISCONTINUED | OUTPATIENT
Start: 2025-04-17 | End: 2025-04-19

## 2025-04-17 RX ORDER — PHENYLEPHRINE HCL IN 0.9% NACL 1 MG/10 ML
SYRINGE (ML) INTRAVENOUS
Status: DISCONTINUED | OUTPATIENT
Start: 2025-04-17 | End: 2025-04-17

## 2025-04-17 RX ORDER — SODIUM CHLORIDE 0.9 % (FLUSH) 0.9 %
10 SYRINGE (ML) INJECTION
Status: DISCONTINUED | OUTPATIENT
Start: 2025-04-17 | End: 2025-04-19

## 2025-04-17 RX ORDER — PROPOFOL 10 MG/ML
VIAL (ML) INTRAVENOUS
Status: DISCONTINUED | OUTPATIENT
Start: 2025-04-17 | End: 2025-04-17

## 2025-04-17 RX ORDER — ACETAMINOPHEN 325 MG/1
650 TABLET ORAL EVERY 4 HOURS PRN
Status: DISCONTINUED | OUTPATIENT
Start: 2025-04-17 | End: 2025-04-21 | Stop reason: HOSPADM

## 2025-04-17 RX ORDER — MIDAZOLAM HYDROCHLORIDE 1 MG/ML
INJECTION INTRAMUSCULAR; INTRAVENOUS
Status: DISCONTINUED | OUTPATIENT
Start: 2025-04-17 | End: 2025-04-17

## 2025-04-17 RX ORDER — FENTANYL CITRATE 50 UG/ML
25 INJECTION, SOLUTION INTRAMUSCULAR; INTRAVENOUS EVERY 5 MIN PRN
Refills: 0 | Status: DISCONTINUED | OUTPATIENT
Start: 2025-04-17 | End: 2025-04-19

## 2025-04-17 RX ORDER — HYDROMORPHONE HYDROCHLORIDE 1 MG/ML
0.2 INJECTION, SOLUTION INTRAMUSCULAR; INTRAVENOUS; SUBCUTANEOUS EVERY 5 MIN PRN
Refills: 0 | Status: DISCONTINUED | OUTPATIENT
Start: 2025-04-17 | End: 2025-04-19

## 2025-04-17 RX ORDER — FLUTICASONE FUROATE AND VILANTEROL 200; 25 UG/1; UG/1
1 POWDER RESPIRATORY (INHALATION) DAILY
Status: DISCONTINUED | OUTPATIENT
Start: 2025-04-17 | End: 2025-04-21 | Stop reason: HOSPADM

## 2025-04-17 RX ORDER — FENTANYL CITRATE 50 UG/ML
INJECTION, SOLUTION INTRAMUSCULAR; INTRAVENOUS
Status: DISCONTINUED | OUTPATIENT
Start: 2025-04-17 | End: 2025-04-17

## 2025-04-17 RX ORDER — LIDOCAINE HYDROCHLORIDE 20 MG/ML
INJECTION, SOLUTION EPIDURAL; INFILTRATION; INTRACAUDAL; PERINEURAL
Status: DISCONTINUED | OUTPATIENT
Start: 2025-04-17 | End: 2025-04-17

## 2025-04-17 RX ORDER — PROCHLORPERAZINE EDISYLATE 5 MG/ML
5 INJECTION INTRAMUSCULAR; INTRAVENOUS EVERY 30 MIN PRN
Status: DISCONTINUED | OUTPATIENT
Start: 2025-04-17 | End: 2025-04-19

## 2025-04-17 RX ORDER — ROCURONIUM BROMIDE 10 MG/ML
INJECTION, SOLUTION INTRAVENOUS
Status: DISCONTINUED | OUTPATIENT
Start: 2025-04-17 | End: 2025-04-17

## 2025-04-17 RX ORDER — LIDOCAINE HYDROCHLORIDE 10 MG/ML
INJECTION, SOLUTION EPIDURAL; INFILTRATION; INTRACAUDAL; PERINEURAL
Status: DISCONTINUED | OUTPATIENT
Start: 2025-04-17 | End: 2025-04-17 | Stop reason: HOSPADM

## 2025-04-17 RX ORDER — GLUCAGON 1 MG
1 KIT INJECTION
Status: DISCONTINUED | OUTPATIENT
Start: 2025-04-17 | End: 2025-04-19

## 2025-04-17 RX ORDER — ONDANSETRON HYDROCHLORIDE 2 MG/ML
INJECTION, SOLUTION INTRAVENOUS
Status: DISCONTINUED | OUTPATIENT
Start: 2025-04-17 | End: 2025-04-17

## 2025-04-17 RX ADMIN — Medication 100 MCG: at 10:04

## 2025-04-17 RX ADMIN — ACETAMINOPHEN 650 MG: 325 TABLET ORAL at 07:04

## 2025-04-17 RX ADMIN — COLCHICINE 0.6 MG: 0.6 TABLET, FILM COATED ORAL at 08:04

## 2025-04-17 RX ADMIN — PANTOPRAZOLE SODIUM 40 MG: 40 TABLET, DELAYED RELEASE ORAL at 08:04

## 2025-04-17 RX ADMIN — PROPOFOL 200 MG: 10 INJECTION, EMULSION INTRAVENOUS at 10:04

## 2025-04-17 RX ADMIN — IBUPROFEN 800 MG: 400 TABLET ORAL at 08:04

## 2025-04-17 RX ADMIN — LIDOCAINE 1 PATCH: 50 PATCH CUTANEOUS at 08:04

## 2025-04-17 RX ADMIN — MIDAZOLAM 2 MG: 1 INJECTION INTRAMUSCULAR; INTRAVENOUS at 10:04

## 2025-04-17 RX ADMIN — AMPICILLIN SODIUM AND SULBACTAM SODIUM 3 G: 2; 1 INJECTION, POWDER, FOR SOLUTION INTRAMUSCULAR; INTRAVENOUS at 07:04

## 2025-04-17 RX ADMIN — AMPICILLIN SODIUM AND SULBACTAM SODIUM 3 G: 2; 1 INJECTION, POWDER, FOR SOLUTION INTRAMUSCULAR; INTRAVENOUS at 03:04

## 2025-04-17 RX ADMIN — ALBUTEROL SULFATE 2.5 MG: 2.5 SOLUTION RESPIRATORY (INHALATION) at 03:04

## 2025-04-17 RX ADMIN — AMPICILLIN SODIUM AND SULBACTAM SODIUM 3 G: 2; 1 INJECTION, POWDER, FOR SOLUTION INTRAMUSCULAR; INTRAVENOUS at 02:04

## 2025-04-17 RX ADMIN — ALBUTEROL SULFATE 2.5 MG: 2.5 SOLUTION RESPIRATORY (INHALATION) at 08:04

## 2025-04-17 RX ADMIN — ONDANSETRON 4 MG: 2 INJECTION INTRAMUSCULAR; INTRAVENOUS at 11:04

## 2025-04-17 RX ADMIN — FENTANYL CITRATE 100 MCG: 50 INJECTION INTRAMUSCULAR; INTRAVENOUS at 10:04

## 2025-04-17 RX ADMIN — SODIUM CHLORIDE: 0.9 INJECTION, SOLUTION INTRAVENOUS at 10:04

## 2025-04-17 RX ADMIN — PROPOFOL 150 MCG/KG/MIN: 10 INJECTION, EMULSION INTRAVENOUS at 10:04

## 2025-04-17 RX ADMIN — LIDOCAINE HYDROCHLORIDE 80 MG: 20 INJECTION, SOLUTION EPIDURAL; INFILTRATION; INTRACAUDAL at 10:04

## 2025-04-17 RX ADMIN — SUGAMMADEX 200 MG: 100 INJECTION, SOLUTION INTRAVENOUS at 11:04

## 2025-04-17 RX ADMIN — IBUPROFEN 800 MG: 400 TABLET ORAL at 06:04

## 2025-04-17 RX ADMIN — ROCURONIUM BROMIDE 50 MG: 10 INJECTION INTRAVENOUS at 10:04

## 2025-04-17 RX ADMIN — AMPICILLIN SODIUM AND SULBACTAM SODIUM 3 G: 2; 1 INJECTION, POWDER, FOR SOLUTION INTRAMUSCULAR; INTRAVENOUS at 08:04

## 2025-04-17 NOTE — PROGRESS NOTES
[facility] - [facility] ([facility])  Cardiology  Progress Note    Patient Name: Shekhar Snyder  MRN: [id number]  Admission Date: 4/11/2025  Hospital Length of Stay: 6 days  Code Status: Full Code   Attending Physician: Ernesto Wing MD   Primary Care Physician: Fracisco Phipps MD  Expected Discharge Date: 4/22/2025  Principal Problem:Pericardial effusion    Subjective:     Hospital Course:   Admitted for pericarditis and pericardial effusion with tamponade physiology. 1.6L of pericardial fluid were drained by interventional cardiology. Started on ibuprofen and colchicine. Drainage improved with eventual removal of drain. Some pleural effusions were noted, however, O2 saturations remained stable. CXR ordered prior to discharge revealed improvement in pericardial effusion and new pleural reaction atelectasis and/or consolidation change in left lower lobe.     Patient was medically cleared for discharge, however, prior to discharge patient became hypoxic, CXR was done which showed pleural effusion/atelectasis and was not discharged. Pulmonology was consulted, deferred thoracentesis, recommended CT chest. Rheumatology was also consulted for evaluation of possible autoimmune processes.   He     Interval History: NAEON. Pt taken for bronch this morning.     ROS    Objective:     Vital Signs (Most Recent):  Temp: 98.1 °F (36.7 °C) (04/17/25 1130)  Pulse: (!) 128 (04/17/25 1145)  Resp: (!) 24 (04/17/25 1145)  BP: (!) 101/56 (04/17/25 1145)  SpO2: 95 % (04/17/25 1145) Vital Signs (24h Range):  Temp:  [97.9 °F (36.6 °C)-99.5 °F (37.5 °C)] 98.1 °F (36.7 °C)  Pulse:  [] 128  Resp:  [10-25] 24  SpO2:  [93 %-98 %] 95 %  BP: ()/(54-72) 101/56     Weight: 76.7 kg (169 lb 1.5 oz)  Body mass index is 21.14 kg/m².     SpO2: 95 %         Intake/Output Summary (Last 24 hours) at 4/17/2025 1154  Last data filed at 4/17/2025 1120  Gross per 24 hour   Intake 860 ml   Output 400 ml   Net 460 ml       Lines/Drains/Airways        Peripheral Intravenous Line  Duration                  Peripheral IV - Single Lumen 04/14/25 1441 20 G Left Antecubital 2 days                       Physical Exam  Vitals and nursing note reviewed.   Constitutional:       General: He is not in acute distress.     Appearance: Normal appearance. He is not ill-appearing.   HENT:      Head: Normocephalic and atraumatic.   Cardiovascular:      Rate and Rhythm: Normal rate and regular rhythm.   Pulmonary:      Effort: Pulmonary effort is normal. No respiratory distress.      Breath sounds: Normal breath sounds. No wheezing.   Chest:      Comments: Drain removed.  Skin:     General: Skin is warm and dry.   Neurological:      General: No focal deficit present.      Mental Status: He is oriented to person, place, and time.            Significant Labs: All pertinent lab results from the last 24 hours have been reviewed.  Assessment and Plan:     * Pericardial effusion  Symptomatic with pain and SOB, elevations in CRP. EKG unremarkable. Hemodynamics stable, however, echo showed massive effusion, CVP 15, RV diastolic collapse and early cardiac tamponade, consequently, underwent STAT pericardiocentesis and 1.6 L fluid removed with drain left in place.   F/u studies  Continue colchicine and ibuprofen         Pleural effusion  Patient found to have new pleural effusion and/or consolidation change in LLL noted on bedside echo and CXR done after removal of pericardial drain. Pt was preparing to discharge when O2 saturations dropped. Could be related to inflammatory process from pericarditis.   CT chest done - Occlusion of the left inferior lobar bronchus, with essentially complete collapse of the basal segments of the left lung. Left pleural effusion present, mucous plugging of several right lower lobe bronchioles and atelectasis.   Pulm consulted - s/p bronch  Rheum consult for eval of possible autoimmune process - numerous labs pending  Pulm recs  Echo with bubble study -  pending  Unasyn  F/u cultures from bronch      Pericarditis  Sent to hospital from outpatient cardiology follow up for initial episode of pericarditis from March. Etiology unknown, thought to be 2/2 to influenza infection from February. Incomplete adherence to ibuprofen and colchicine 2/2 GI side effects. Unclear if current presentation is recurrent pericarditis vs incompletely treated primary episode.    Continue ibuprofen and colchicine  PPI  If no resolution of symptoms, can consider steroid course        VTE Risk Mitigation (From admission, onward)           Ordered     IP VTE HIGH RISK PATIENT  Once         04/11/25 2151     Place sequential compression device  Until discontinued         04/11/25 2151                    Rowena Marquez DO  Cardiology  [facility] - Surgery (2nd Fl)

## 2025-04-17 NOTE — ASSESSMENT & PLAN NOTE
Patient found to have new pleural effusion and/or consolidation change in LLL noted on bedside echo and CXR done after removal of pericardial drain. Pt was preparing to discharge when O2 saturations dropped. Could be related to inflammatory process from pericarditis.   CT chest done - Occlusion of the left inferior lobar bronchus, with essentially complete collapse of the basal segments of the left lung. Left pleural effusion present, mucous plugging of several right lower lobe bronchioles and atelectasis.   Pulm consulted - s/p bronch  Rheum consult for eval of possible autoimmune process - numerous labs pending  Pulm recs  Echo with bubble study - pending  Unasyn  F/u cultures from bronch

## 2025-04-17 NOTE — PROGRESS NOTES
"Pulmonary  Medicine Progress Note    Subjective:   No overnight events. Patient states he is doing well. He is now saturating well on room air. Patient taken for bronchoscopy this morning. Patient had erythematous airways and left sided airways found to have large amount of purulent secretions. Secretions were suctioned out and irrigated with saline. Secretions were sent for cultures. Patient tolerated procedure well with no immediate complications. Mother updated.     Vital Signs:   Vitals:    04/17/25 0933   BP: 113/60   Pulse: 95   Resp: 10   Temp: 97.9 °F (36.6 °C)       Fluid Balance:     Intake/Output Summary (Last 24 hours) at 4/17/2025 1123  Last data filed at 4/16/2025 1636  Gross per 24 hour   Intake 360 ml   Output 400 ml   Net -40 ml       Physical Exam:   Vitals and nursing note reviewed.   Constitutional:       General: He is not in acute distress.     Appearance: Normal appearance. He is normal weight. He is not ill-appearing or toxic-appearing.   HENT:      Head: Normocephalic and atraumatic.      Right Ear: Tympanic membrane normal.      Left Ear: Tympanic membrane normal.      Nose: Nose normal.      Mouth/Throat:      Mouth: Mucous membranes are moist.   Eyes:      Pupils: Pupils are equal, round, and reactive to light.   Cardiovascular:      Rate and Rhythm: Normal rate and regular rhythm.      Pulses: Normal pulses.      Heart sounds: Normal heart sounds.   Musculoskeletal:      Cervical back: Normal range of motion.   Neurological:      Mental Status: He is alert.     Laboratory Studies:   No results for input(s): "PH", "PCO2", "PO2", "HCO3", "POCSATURATED", "BE" in the last 24 hours.  Recent Labs   Lab 04/17/25  0526   WBC 13.97*   RBC 4.72   HGB 13.3*   HCT 40.8      MCV 86   MCH 28.2   MCHC 32.6     Recent Labs   Lab 04/17/25  0526   GLUCOSE 95      K 4.0      CO2 24   BUN 11   CREATININE 0.7   CALCIUM 8.7   MG 1.9       Microbiology Data:   Microbiology Results (last 7 " days)       Procedure Component Value Units Date/Time    Culture, Respiratory with Gram Stain [[id number]] Collected: 04/17/25 1108    Order Status: Sent Specimen: Respiratory from Lung, Left Lower Lobe Updated: 04/17/25 1115    Culture, Respiratory with Gram Stain [[id number]] Collected: 04/17/25 1106    Order Status: Canceled Specimen: Washing from Lung, Left Lower Lobe     Culture, Respiratory with Gram Stain [[id number]] Collected: 04/16/25 1458    Order Status: Completed Specimen: Respiratory from Sputum Updated: 04/16/25 2239     GRAM STAIN <10 Epithelial Cells/LPF      Rare WBC seen      Many Gram positive cocci    Culture, Anaerobe [[id number]] Collected: 04/11/25 2045    Order Status: Completed Specimen: Pericardial fluid Updated: 04/16/25 1019     Anaerobe Culture Culture In Progress    Aerobic culture [[id number]] Collected: 04/11/25 2045    Order Status: Completed Specimen: Pericardial fluid Updated: 04/15/25 0649     CULTURE, AEROBIC No Growth    Afb Culture Stain [[id number]] Collected: 04/11/25 2045    Order Status: Completed Specimen: Pericardial fluid Updated: 04/14/25 1449     ACID FAST STAIN  No acid fast bacilli seen    Gram stain [[id number]] Collected: 04/11/25 2045    Order Status: Completed Specimen: Pericardial fluid Updated: 04/12/25 1122     GRAM STAIN No WBCs      No organisms seen    AFB Culture & Smear [[id number]] Collected: 04/11/25 2045    Order Status: Resulted Specimen: Pericardial fluid Updated: 04/11/25 2127    Culture, Anaerobe [[id number]]     Order Status: Canceled Specimen: Pericardial fluid     Aerobic culture [[id number]]     Order Status: Canceled Specimen: Pericardial fluid     AFB Culture & Smear [[id number]]     Order Status: Canceled Specimen: Pericardial fluid              Chest Imaging:   No new imaging.     Infusions:        Scheduled Medications:    albuterol sulfate  2.5 mg Nebulization Q8H    ampicillin-sulbactam  3 g Intravenous Q6H    colchicine  0.6 mg Oral  BID    fluticasone furoate-vilanteroL  1 puff Inhalation Daily    ibuprofen  800 mg Oral Q8H    LIDOcaine  1 patch Transdermal Q24H    pantoprazole  40 mg Oral Daily       PRN Medications:     Current Facility-Administered Medications:     acetaminophen, 650 mg, Oral, Q4H PRN    albuterol, 2 puff, Inhalation, Q4H PRN    ondansetron, 8 mg, Oral, Q8H PRN    sodium chloride 0.9%, 10 mL, Intravenous, PRN    Assessment & Plan:     Pleural effusion  24 y.o. presented to [facility] on 4/11/2025 with a primary complaint of Chest Pain found to have a large recurrent pericardial effusion with tamponade physiology. Discharge was planned for 4/14, but the patient developed hypoxia and imaging revealed a new pleural effusion. Pulmonology was consulted for evaluation and management, including possible thoracentesis. Repeat CXR showing small pleural effusion. Pulmonology team consulted for evaluation.     Atelectasis with mucus plugging     CT chest 4/15: Moderate left pleural effusion. Mucous plugging of several right lower lobe bronchioles with subsegmental atelectasis.    Bronchoscopy performed on 4/17/2025 showing large amount of purulent secretions on left sided with mucus plugging of left upper lobe. Left sided airways suctioned and irrigated to clear secretions     Plan:     -- Continue Unasyn  - Follow up cultures from bronchoscopy and adjust antibiotics based on results. Recommend treating for a total of 7-10 days.  -- keep bronchodilators at this time  -- Recommend echo with bubble study to rule out shunt  -- Bedside US done showing a small pocket of fluid not enough to be drained at this time      Thank you for involving us in the care of this patient.  Please call with any questions.    Benny Welch,   [facility] PCCM Fellow, PGY 4

## 2025-04-17 NOTE — ANESTHESIA PROCEDURE NOTES
Intubation    Date/Time: 4/17/2025 10:52 AM    Performed by: Alfa Urias DO  Authorized by: Evelia Owens MD    Intubation:     Induction:  Intravenous    Intubated:  Postinduction    Mask Ventilation:  Easy mask    Attempts:  1    Attempted By:  Resident anesthesiologist    Method of Intubation:  Direct    Blade:  Neil 3    Laryngeal View Grade: Grade I - full view of cords      Difficult Airway Encountered?: No      Complications:  None    Airway Device:  Oral endotracheal tube    Airway Device Size:  8.0    Style/Cuff Inflation:  Cuffed    Tube secured:  24    Secured at:  The lips    Placement Verified By:  Capnometry    Complicating Factors:  None    Findings Post-Intubation:  BS equal bilateral and atraumatic/condition of teeth unchanged

## 2025-04-17 NOTE — TRANSFER OF CARE
"Anesthesia Transfer of Care Note    Patient: Shekhar Snyder    Procedure(s) Performed: Procedure(s) (LRB):  Bronchoscopy (N/A)    Patient location: PACU    Anesthesia Type: general    Transport from OR: Transported from OR on 6-10 L/min O2 by face mask with adequate spontaneous ventilation    Post pain: adequate analgesia    Post assessment: no apparent anesthetic complications and tolerated procedure well    Post vital signs: stable    Level of consciousness: awake, alert and oriented    Nausea/Vomiting: no nausea/vomiting    Complications: none    Transfer of care protocol was followed      Last vitals: Visit Vitals  /60 (BP Location: Right arm, Patient Position: Lying)   Pulse 95   Temp 36.6 °C (97.9 °F) (Temporal)   Resp 10   Ht 6' 3" (1.905 m)   Wt 76.7 kg (169 lb 1.5 oz)   SpO2 98%   BMI 21.14 kg/m²     "

## 2025-04-17 NOTE — MEDICAL/APP STUDENT
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 He was sick for roughly 2 weeks and unable to tolerate physical exertion even in his daily activities. Rx with Tamiflu  3/10/25-3/12/25- Presented with chest pain, dyspnea to ED and found to have acute pericarditis suspected to be post-viral; given ibuprofen 800mg three times daily and colchicine 0,6 mg twice daily  then discharged     Current Hospitalization:  4/11/25-now- Returned to the ED with SOB and chest pain, "felt like he was dying" for 3-4 days prior to ED presentation, but "wanted to sign a lease" for a new apartment then had a cardiology appointment. Presented with pericardial effusion with pericardial tamponade  at outpatient cardiologist then was sent for pericardiocentesis with  interventional cardiology (~ 1.6 L of serous pericardial fluid). Pt was going to be d/c'd 4/14 but continues to have dyspnea and limited physical activity tolerance. Rheumatology consulted for "Recurrent pericarditis, pleural effusion,  and family hx of pericarditis. Concern for autoimmune process? Connective tissue disorder?" Pt was found to have mucus plugging and pleural effusion of the left lower lung. 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 Pt notes increasing pain with deep breaths compared to yesterday    Active Ambulatory Problems     Diagnosis Date Noted    Reading disorder 03/20/2013    Mild early onset dysthymic disorder, in full remission, with anxious distress, with pure dysthymic syndrome 08/27/2013    Attention deficit hyperactivity disorder (ADHD), predominantly inattentive type 06/30/2016    Acute pain of right shoulder 01/17/2017    Closed displaced fracture of shaft of left clavicle 09/14/2022    Pericardial effusion 03/11/2025    Chest pain 03/11/2025    Former smoker 03/11/2025    Pericarditis 03/12/2025     Resolved Ambulatory Problems     Diagnosis Date Noted    Asthma attack 11/16/2012    Bronchitis 11/16/2012    PTSD (post-traumatic stress disorder) 08/27/2013     Past Medical History:   Diagnosis Date    ADHD (attention deficit hyperactivity disorder)     Depression       Family History   Problem Relation Name Age of Onset    Oswald Vinod sequence Brother Shekhar morillo     Asthma Brother Shekhar morillo     Bipolar disorder Maternal Uncle      Alcohol abuse Father      Drug abuse Father      Personality disorder Father      ADD / ADHD Mother      Depression Mother      Physical abuse Paternal Grandmother      Recurrent pericardial effusions- Mother (5 episodes in 3 yrs)        Tobacco Use: High Risk (4/11/2025)     Patient History      Smoking Tobacco Use: Every Day      Smokeless Tobacco Use: Never      Passive Exposure: Never   Patient quit nicotine use 2-3 months ago. Patient was smoking marijauna daily,but has not during this hospitalization and plans to quit in the future.    Objective:    Vitals:    04/17/25 0933   BP: 113/60   Pulse: 95   Resp: 10    Temp: 97.9 °F (36.6 °C)      Physical Exam  Vitals reviewed.   Constitutional:       General: He is not in acute distress.     Appearance: He is not toxic-appearing.   HENT:      Head: Normocephalic.      Mouth/Throat:      Mouth: Mucous membranes are moist.      Pharynx: No posterior oropharyngeal erythema.   Eyes:      Conjunctiva/sclera: Conjunctivae normal.   Cardiovascular:      Rate and Rhythm: Normal rate and regular rhythm.   Pulmonary:      Breath sounds: Normal breath sounds.   Abdominal:      General: There is no distension.      Palpations: Abdomen is soft.      Tenderness: There is no abdominal tenderness.   Musculoskeletal:         General: No swelling or tenderness. Normal range of motion.      Cervical back: Normal range of motion. No tenderness.   Skin:     General: Skin is warm.      Capillary Refill: Capillary refill takes less than 2 seconds.      Findings: No rash.   Neurological:      Mental Status: He is alert and oriented to person, place, and time.        Recent Labs   Lab 04/17/25  0526   WBC 13.97*   RBC 4.72   HGB 13.3*   HCT 40.8      MCV 86   MCH 28.2   MCHC 32.6      Recent Labs   Lab 04/17/25  0526   GLUCOSE 95   CALCIUM 8.7   ALBUMIN 3.0*      K 4.0   CO2 24      BUN 11   CREATININE 0.7   ALKPHOS 70   ALT 24   AST 15   BILITOT 0.5     Lab Results   Component Value Date    SEDRATE 16 04/11/2025     Lab Results   Component Value Date    .0 (H) 04/11/2025   CRP 81.06    Imaging   No results found in the last 24 hours.    Assessment/Plan:      Pericarditis/ recurrent pericardial effusions with tamponade physiology   Improved after pericardiocentesis, will need to evaluate to monitor for recurrence/ worsening of symptoms. Pt denies any other current symptoms that would be concerning for a systemic rheumatologic process. Pt has worsening pleuritic chest pain identified as left lower lung pleural effusion and mucus plugging    -continue ibuprofen 800 mg TID (or  Naproxen 500 mg BID or Celecoxib 200 mg QD) and colchicine 0.6 mg BID for acute pericarditis management  -Will review and complete any other lab work to differentiate etiology vs post-viral              RF, CCP Ab, CK, immunoglobulins, Anti-centromere normal, U/A, UPC unremarkable              Pending myomarker panel, ANCA, Anti-scleroderma, Th/To antibody, RNA Poly antibody, IgG subclass 4  -Discussed E-consult Genetics w/ patient- amenable at this time  -Pulm following and scheduled bronchoscopy for left lung mucus plugging       Active Diagnoses:    Diagnosis Date Noted POA    PRINCIPAL PROBLEM:  Pericardial effusion [I31.39] 03/11/2025 Yes    Cardiac tamponade [I31.4] 04/16/2025 Yes    Acute hypoxemic respiratory failure [J96.01] 04/16/2025 No    Atelectasis [J98.11] 04/16/2025 No    Pleural effusion [J90] 04/15/2025 No    Pericarditis [I31.9] 03/12/2025 Yes    Former smoker [Z87.891] 03/11/2025 Not Applicable    Attention deficit hyperactivity disorder (ADHD), predominantly inattentive type [F90.0] 06/30/2016 Yes      Problems Resolved During this Admission:     VTE Risk Mitigation (From admission, onward)           Ordered     IP VTE HIGH RISK PATIENT  Once         04/11/25 2151     Place sequential compression device  Until discontinued         04/11/25 2151                     Thank you for your consult. Assessment and plan will be discussed further on rounds with fellow, Dr. Romeo and supervising attending, Dr. Jaida Livingston, MS4

## 2025-04-17 NOTE — SUBJECTIVE & OBJECTIVE
Interval History: NAEON. Pt taken for bronch this morning.     ROS    Objective:     Vital Signs (Most Recent):  Temp: 98.1 °F (36.7 °C) (04/17/25 1130)  Pulse: (!) 128 (04/17/25 1145)  Resp: (!) 24 (04/17/25 1145)  BP: (!) 101/56 (04/17/25 1145)  SpO2: 95 % (04/17/25 1145) Vital Signs (24h Range):  Temp:  [97.9 °F (36.6 °C)-99.5 °F (37.5 °C)] 98.1 °F (36.7 °C)  Pulse:  [] 128  Resp:  [10-25] 24  SpO2:  [93 %-98 %] 95 %  BP: ()/(54-72) 101/56     Weight: 76.7 kg (169 lb 1.5 oz)  Body mass index is 21.14 kg/m².     SpO2: 95 %         Intake/Output Summary (Last 24 hours) at 4/17/2025 1154  Last data filed at 4/17/2025 1120  Gross per 24 hour   Intake 860 ml   Output 400 ml   Net 460 ml       Lines/Drains/Airways       Peripheral Intravenous Line  Duration                  Peripheral IV - Single Lumen 04/14/25 1441 20 G Left Antecubital 2 days                       Physical Exam  Vitals and nursing note reviewed.   Constitutional:       General: He is not in acute distress.     Appearance: Normal appearance. He is not ill-appearing.   HENT:      Head: Normocephalic and atraumatic.   Cardiovascular:      Rate and Rhythm: Normal rate and regular rhythm.   Pulmonary:      Effort: Pulmonary effort is normal. No respiratory distress.      Breath sounds: Normal breath sounds. No wheezing.   Chest:      Comments: Drain removed.  Skin:     General: Skin is warm and dry.   Neurological:      General: No focal deficit present.      Mental Status: He is oriented to person, place, and time.            Significant Labs: All pertinent lab results from the last 24 hours have been reviewed.

## 2025-04-17 NOTE — ANESTHESIA POSTPROCEDURE EVALUATION
Anesthesia Post Evaluation    Patient: Shekhar Snyder    Procedure(s) Performed: Procedure(s) (LRB):  Bronchoscopy (N/A)    Final Anesthesia Type: general      Patient location during evaluation: PACU  Patient participation: Yes- Able to Participate  Level of consciousness: awake and alert and oriented  Post-procedure vital signs: reviewed and stable  Pain management: adequate  Airway patency: patent    PONV status at discharge: No PONV  Anesthetic complications: no      Cardiovascular status: hemodynamically stable  Respiratory status: unassisted and spontaneous ventilation  Hydration status: euvolemic  Follow-up not needed.          Vitals Value Taken Time   /57 04/17/25 11:31   Pulse 133 04/17/25 11:38   Resp 32 04/17/25 11:38   SpO2 96 % 04/17/25 11:38   Vitals shown include unfiled device data.      No case tracking events are documented in the log.      Pain/Isabela Score: Pain Rating Prior to Med Admin: 0 (4/17/2025  6:42 AM)  Pain Rating Post Med Admin: 0 (4/16/2025 10:28 PM)

## 2025-04-17 NOTE — PROGRESS NOTES
"[facility]  Rheumatology  Progress Note    Patient Name: Shekhar Snyder  MRN: [id number]  Admission Date: 4/11/2025  Hospital Length of Stay: 6 days  Code Status: Full Code   Attending Provider: Ernesto Wing MD  Primary Care Physician: Fracisco Phipps MD  Principal Problem: Pericardial effusion    Subjective:     HPI: Shekhar Snyder is a 23yo M with PMH of post viral/Influenza A pericarditis, mild intermittent asthma, daily marijuana use, prior tobacco/nicotine use (quit 2-3 mos ago), ADHD, PTSD, depression.    Recent Pertinent History:  - 2/6/25: Seen in urgent care and dx with Influenza A - with symptoms of headaches, myalgia, nausea, vomiting, diarrhea, cough, congestion, MCCALLUM x 3-5 days  - 3/10-12/25: Admitted with complaints of mid sternal chest pain/tightness, dyspnea, lightheadedness x 4 days, symptoms worse with lying back, leaning forward, exertion, or deep inhalation - managed for suspected post viral pericarditis, with NSAIDs and colchicine  - Echo (3/11/25) with moderate pericardial effusion without tamponade  - Developed GI side effects with high dose ibuprofen, so decreased and stopped it eventually  - Symptoms never completely resolved but had improved in the interim    Current Hospitalization (4/11/25-current):  - Seen by outpt cardiology on 4/11 and found to have persistent vs recurrent large effusion, now with tamponade  - Therefore admitted to hospital for acute pericarditis/effusion/tamponade management  - 4/11: taken to cath lab for pericardiocentesis, s/p removal of 1500cc serous fluid  - Restarted high dose NSAIDs (ibuprofen 800mg Q8h) and colchicine 0.6mg BID  - Pt was going to be d/c'd on 4/14 but got hypoxic and found to have pleural effusion on US and CXR, so stayed in hospital  - Pulmonology evaluated, no indication for thoracentesis at this time    Rheumatology was consulted for "Recurrent pericarditis, pleural effusion, and family hx of pericarditis. Concerns for " "autoimmune process? Connective tissue disorder?"    On initial evaluation, pt endorses the above hx. History is obtained from pt and his mother at bedside. Continues to have some dyspnea, cough, and chest tightness, O2 sats drop with any exertion. Denies any current or recent h/o fevers, chills, other infectious/sick contacts (other than prior flu in February), nausea, vomiting, GI changes, urinary changes, joint pain, joint swelling, joint stiffness (other that occasional mild ankle stiffness), skin rash/changes, oral/nasal ulcers, inflammatory eye problems, alopecia. Pt endorses some light sensitivity in his eyes.     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 Planning for bronch today.    Current Facility-Administered Medications   Medication Frequency    acetaminophen tablet 650 mg Q4H PRN    albuterol inhaler 2 puff Q4H PRN    albuterol sulfate nebulizer solution 2.5 mg Q8H    ampicillin-sulbactam (UNASYN) 3 g in 0.9% NaCl 100 mL IVPB (MB+) Q6H    colchicine capsule/tablet 0.6 mg BID    fluticasone furoate-vilanteroL 200-25 mcg/dose diskus inhaler 1 puff Daily    ibuprofen tablet 800 mg Q8H    LIDOcaine 5 % patch 1 patch Q24H    ondansetron disintegrating tablet 8 mg Q8H PRN    pantoprazole EC tablet 40 mg Daily    sodium chloride 0.9% flush 10 mL PRN     Objective:     Vital Signs (Most Recent):  Temp: 99.5 °F (37.5 °C) (04/17/25 0727)  Pulse: 105 (04/17/25 0802)  Resp: 18 (04/17/25 0802)  BP: 118/65 (04/17/25 0727)  SpO2: (!) 94 % (04/17/25 0802) Vital Signs (24h Range):  Temp:  [98 °F (36.7 °C)-99.5 °F (37.5 °C)] 99.5 °F (37.5 °C)  Pulse:  [] 105  Resp:  [11-27] " 18  SpO2:  [93 %-97 %] 94 %  BP: ()/(54-72) 118/65     Weight: 77 kg (169 lb 12.1 oz) (04/15/25 0424)  Body mass index is 21.22 kg/m².  Body surface area is 2.02 meters squared.      Intake/Output Summary (Last 24 hours) at 4/17/2025 0915  Last data filed at 4/16/2025 1636  Gross per 24 hour   Intake 360 ml   Output 400 ml   Net -40 ml        Physical Exam   Constitutional: He is oriented to person, place, and time. He appears well-developed and well-nourished. No distress.   HENT:   Head: Normocephalic and atraumatic.   Right Ear: External ear normal.   Left Ear: External ear normal.   Nose: Nose normal. No nasal congestion.   Mouth/Throat: Oropharynx is clear and moist. Mucous membranes are moist. Oropharynx is clear.   Eyes: Conjunctivae are normal.   Cardiovascular: Normal rate and regular rhythm.   Pulmonary/Chest: Effort normal. No respiratory distress. He has no wheezes.   Abdominal: Soft. He exhibits no distension. There is no abdominal tenderness.   Musculoskeletal:         General: No swelling or tenderness. Normal range of motion.      Cervical back: Normal range of motion and neck supple.      Comments: No acute synovitis in any of the small or large joints.   Neurological: He is alert and oriented to person, place, and time.   Skin: Skin is warm and dry. No lesion and no rash noted.   Psychiatric: His behavior is normal. Mood normal.   Vitals reviewed.       Significant Labs:  All pertinent lab results from the last 24 hours have been reviewed.    Significant Imaging:  Imaging results within the past 24 hours have been reviewed.  Assessment/Plan:     Cardiac/Vascular  Pericarditis  Shekhar Snyder is a 23yo M with PMH of post viral/Influenza A pericarditis, mild intermittent asthma, daily marijuana use, prior tobacco/nicotine use (quit 2-3 mos ago), ADHD, PTSD, depression.    - Pt with acute pericarditis (initially dx 3/2025), suspected to be post viral after Influenza A infection 1 month prior to  initial episode  - Pt did not complete full course/tx with high dose NSAIDs and colchicine  - Then found to have large pericardial effusion w/ tamponade requiring pericardiocentesis now (4/11/25)  - Symptoms initially improved, but have not resolved - with persistent chest pain/tightness, dyspnea, and cough  - Now with small pleural effusion as well, pulm feels some mucus plugging and atelectasis  - Pt with no other current symptoms indicative of a systemic autoimmune rheumatologic process  - Labs showed negative DOM/EMERY profile (DOM was only 1:80)    Plan/Recommendations:  - Agree with acute pericarditis management per cardio with high dose NSAIDs and colchicine  - If symptoms do not resolve and/or pt does develop recurrent episode despite full adequate tx with NSAIDs/colchicine - cardio could consider use of IL-1 inhibitor  - Follow up on ordered/pending lab workup  - Consider obtaining autoinflammatory gene panel especially with known family hx of recurrent pericarditis - outpatient e-consult to genetics has been placed   - Pulmonology following and planning for bronch today due to suspected mucus plugging/atelectasis      Assessment and plan will be discussed further on rounds with supervising attending, Dr. Sena. Please do not hesitate to reach out with any questions or concerns.      Navya Romeo MD  PGY-5, Rheumatology    Acute pericarditis onset 2-4 wks after influenza A  S/p pericardial tamponade  Small left pleural effusion   DOM + 1:80 nucleolar   Anemia  Hypoalbuminemia  + mother with recurrent pericarditis      aldolase Scl-70, RNAP III, centromere, Th/To C3 C4 ANCA PR3 MPO IgG4, immunoglobulins anemia labs Myomarker panel  ACPA  UA UPCR  Continue ibuprofen 800mg three times daily(or naproxen 500mg twice daily or celecoxib 200mg daily and colchicine 0.6 mg twice daily. If no continued improvement, trial of anakinra 100mg sc daily  E-Consult Genetics consult given + family history  NGS sequencing  to  analyze favio forTNFRSFIA(TRAPS), MEFV(FMF) and IL B  as recurrent acute pericarditis felt to be auto-inflammatory syndrome and as many as 15% of  cases have genetic risk factor and with his family history, likelihood higher     Philip Sena MD  Rheumatology  [phone]        Patent

## 2025-04-17 NOTE — NURSING
Pt returned to room. Pt AAOx4. Pt stable. No complaints of pain or signs of distress. Will continue to monitor pt.

## 2025-04-17 NOTE — NURSING TRANSFER
Nursing Transfer Note      Reason patient is being transferred: Post procedure    Transfer To: 303    Transfer via stretcher    Transfer with cardiac monitoring    Transported by PCT    Transfer Vital Signs:See flowsheet    Order for Tele Monitor? Yes    Telemetry: Box Number [id number]    Medicines sent: None    Any special needs or follow-up needed: Routine    Patient belongings transferred with patient:  None    Chart send with patient: Yes    Notified: Mother    Patient reassessed at: 4/17/2025 1121

## 2025-04-17 NOTE — SUBJECTIVE & OBJECTIVE
Interval History:   No acute changes overnight or this morning. Pt has continued to improve after undergoing bronch yesterday. Off O2.     Current Facility-Administered Medications   Medication Frequency    acetaminophen tablet 650 mg Q4H PRN    albuterol inhaler 2 puff Q4H PRN    albuterol sulfate nebulizer solution 2.5 mg Q8H PRN    amoxicillin-clavulanate 875-125mg per tablet 1 tablet Q12H    azithromycin tablet 250 mg Daily    colchicine capsule/tablet 0.6 mg BID    dextrose 50% injection 12.5 g PRN    dextrose 50% injection 12.5 g PRN    fentaNYL 50 mcg/mL injection 25 mcg Q5 Min PRN    fluticasone furoate-vilanteroL 200-25 mcg/dose diskus inhaler 1 puff Daily    glucagon (human recombinant) injection 1 mg PRN    glucagon (human recombinant) injection 1 mg PRN    haloperidol lactate injection 0.5 mg Q10 Min PRN    HYDROmorphone injection 0.2 mg Q5 Min PRN    ibuprofen tablet 800 mg Q8H    LIDOcaine 5 % patch 1 patch Q24H    ondansetron disintegrating tablet 8 mg Q8H PRN    oxyCODONE immediate release tablet 5 mg Q3H PRN    pantoprazole EC tablet 40 mg Daily    prochlorperazine injection Soln 5 mg Q30 Min PRN    sodium chloride 0.9% flush 10 mL PRN    sodium chloride 0.9% flush 10 mL PRN     Objective:     Vital Signs (Most Recent):  Temp: 97.9 °F (36.6 °C) (04/18/25 0720)  Pulse: 85 (04/18/25 1042)  Resp: 18 (04/18/25 0720)  BP: (!) 96/57 (04/18/25 0720)  SpO2: 99 % (04/18/25 0720) Vital Signs (24h Range):  Temp:  [97.7 °F (36.5 °C)-102.7 °F (39.3 °C)] 97.9 °F (36.6 °C)  Pulse:  [] 85  Resp:  [16-20] 18  SpO2:  [95 %-99 %] 99 %  BP: ()/(55-68) 96/57     Weight: 76.7 kg (169 lb 1.5 oz) (04/17/25 0933)  Body mass index is 21.14 kg/m².  Body surface area is 2.01 meters squared.      Intake/Output Summary (Last 24 hours) at 4/18/2025 1043  Last data filed at 4/17/2025 1120  Gross per 24 hour   Intake 500 ml   Output --   Net 500 ml        Physical Exam   Constitutional: He is oriented to person, place,  and time. He appears well-developed and well-nourished. No distress.   HENT:   Head: Normocephalic and atraumatic.   Right Ear: External ear normal.   Left Ear: External ear normal.   Nose: Nose normal. No nasal congestion.   Mouth/Throat: Oropharynx is clear and moist. Mucous membranes are moist. Oropharynx is clear.   Eyes: Conjunctivae are normal.   Cardiovascular: Normal rate and regular rhythm.   Pulmonary/Chest: Effort normal. No respiratory distress. He has no wheezes.   Abdominal: Soft. He exhibits no distension. There is no abdominal tenderness.   Musculoskeletal:         General: No swelling or tenderness. Normal range of motion.      Cervical back: Normal range of motion and neck supple.      Comments: No acute synovitis in any of the small or large joints.   Neurological: He is alert and oriented to person, place, and time.   Skin: Skin is warm and dry. No lesion and no rash noted.   Psychiatric: His behavior is normal. Mood normal.   Vitals reviewed.       Significant Labs:  All pertinent lab results from the last 24 hours have been reviewed.    Significant Imaging:  Imaging results within the past 24 hours have been reviewed.

## 2025-04-18 LAB
ABSOLUTE EOSINOPHIL (OHS): 0.32 K/UL
ABSOLUTE MONOCYTE (OHS): 1.35 K/UL (ref 0.3–1)
ABSOLUTE NEUTROPHIL COUNT (OHS): 5.41 K/UL (ref 1.8–7.7)
ALBUMIN SERPL BCP-MCNC: 2.7 G/DL (ref 3.5–5.2)
ALP SERPL-CCNC: 62 UNIT/L (ref 40–150)
ALT SERPL W/O P-5'-P-CCNC: 18 UNIT/L (ref 10–44)
ANION GAP (OHS): 8 MMOL/L (ref 8–16)
AST SERPL-CCNC: 14 UNIT/L (ref 11–45)
BASOPHILS # BLD AUTO: 0.04 K/UL
BASOPHILS NFR BLD AUTO: 0.4 %
BILIRUB SERPL-MCNC: 0.3 MG/DL (ref 0.1–1)
BUN SERPL-MCNC: 15 MG/DL (ref 6–20)
CALCIUM SERPL-MCNC: 8.7 MG/DL (ref 8.7–10.5)
CHLORIDE SERPL-SCNC: 104 MMOL/L (ref 95–110)
CO2 SERPL-SCNC: 27 MMOL/L (ref 23–29)
CREAT SERPL-MCNC: 0.7 MG/DL (ref 0.5–1.4)
CRP SERPL-MCNC: 149.25 MG/L
ERYTHROCYTE [DISTWIDTH] IN BLOOD BY AUTOMATED COUNT: 13.2 % (ref 11.5–14.5)
ERYTHROCYTE [SEDIMENTATION RATE] IN BLOOD BY PHOTOMETRIC METHOD: 27 MM/HR
GFR SERPLBLD CREATININE-BSD FMLA CKD-EPI: >60 ML/MIN/1.73/M2
GLUCOSE SERPL-MCNC: 91 MG/DL (ref 70–110)
HCT VFR BLD AUTO: 38.2 % (ref 40–54)
HGB BLD-MCNC: 11.9 GM/DL (ref 14–18)
IMM GRANULOCYTES # BLD AUTO: 0.05 K/UL (ref 0–0.04)
IMM GRANULOCYTES NFR BLD AUTO: 0.5 % (ref 0–0.5)
LYMPHOCYTES # BLD AUTO: 3.12 K/UL (ref 1–4.8)
MAGNESIUM SERPL-MCNC: 2.2 MG/DL (ref 1.6–2.6)
MCH RBC QN AUTO: 27.6 PG (ref 27–31)
MCHC RBC AUTO-ENTMCNC: 31.2 G/DL (ref 32–36)
MCV RBC AUTO: 89 FL (ref 82–98)
NUCLEATED RBC (/100WBC) (OHS): 0 /100 WBC
PLATELET # BLD AUTO: 345 K/UL (ref 150–450)
PMV BLD AUTO: 11 FL (ref 9.2–12.9)
POTASSIUM SERPL-SCNC: 4.1 MMOL/L (ref 3.5–5.1)
PROT SERPL-MCNC: 6.4 GM/DL (ref 6–8.4)
RBC # BLD AUTO: 4.31 M/UL (ref 4.6–6.2)
RELATIVE EOSINOPHIL (OHS): 3.1 %
RELATIVE LYMPHOCYTE (OHS): 30.3 % (ref 18–48)
RELATIVE MONOCYTE (OHS): 13.1 % (ref 4–15)
RELATIVE NEUTROPHIL (OHS): 52.6 % (ref 38–73)
SODIUM SERPL-SCNC: 139 MMOL/L (ref 136–145)
W IMMUNOGLOBULIN G SUBCLASS 4: 70 MG/DL
W SCLERODERMA (SCL-70) ANTIBODY: <0.6 U/ML
WBC # BLD AUTO: 10.29 K/UL (ref 3.9–12.7)

## 2025-04-18 PROCEDURE — 86141 C-REACTIVE PROTEIN HS: CPT | Performed by: STUDENT IN AN ORGANIZED HEALTH CARE EDUCATION/TRAINING PROGRAM

## 2025-04-18 PROCEDURE — 25000003 PHARM REV CODE 250: Performed by: STUDENT IN AN ORGANIZED HEALTH CARE EDUCATION/TRAINING PROGRAM

## 2025-04-18 PROCEDURE — 83735 ASSAY OF MAGNESIUM: CPT | Performed by: STUDENT IN AN ORGANIZED HEALTH CARE EDUCATION/TRAINING PROGRAM

## 2025-04-18 PROCEDURE — 25000003 PHARM REV CODE 250

## 2025-04-18 PROCEDURE — 85652 RBC SED RATE AUTOMATED: CPT | Performed by: STUDENT IN AN ORGANIZED HEALTH CARE EDUCATION/TRAINING PROGRAM

## 2025-04-18 PROCEDURE — 63600175 PHARM REV CODE 636 W HCPCS: Performed by: INTERNAL MEDICINE

## 2025-04-18 PROCEDURE — 25000003 PHARM REV CODE 250: Performed by: INTERNAL MEDICINE

## 2025-04-18 PROCEDURE — 99231 SBSQ HOSP IP/OBS SF/LOW 25: CPT | Mod: ,,, | Performed by: INTERNAL MEDICINE

## 2025-04-18 PROCEDURE — 99900035 HC TECH TIME PER 15 MIN (STAT)

## 2025-04-18 PROCEDURE — 80053 COMPREHEN METABOLIC PANEL: CPT | Performed by: STUDENT IN AN ORGANIZED HEALTH CARE EDUCATION/TRAINING PROGRAM

## 2025-04-18 PROCEDURE — 85025 COMPLETE CBC W/AUTO DIFF WBC: CPT | Performed by: STUDENT IN AN ORGANIZED HEALTH CARE EDUCATION/TRAINING PROGRAM

## 2025-04-18 PROCEDURE — 36415 COLL VENOUS BLD VENIPUNCTURE: CPT | Performed by: STUDENT IN AN ORGANIZED HEALTH CARE EDUCATION/TRAINING PROGRAM

## 2025-04-18 PROCEDURE — 94640 AIRWAY INHALATION TREATMENT: CPT

## 2025-04-18 PROCEDURE — 63700000 PHARM REV CODE 250 ALT 637 W/O HCPCS

## 2025-04-18 PROCEDURE — 20600001 HC STEP DOWN PRIVATE ROOM

## 2025-04-18 PROCEDURE — 25000242 PHARM REV CODE 250 ALT 637 W/ HCPCS: Performed by: STUDENT IN AN ORGANIZED HEALTH CARE EDUCATION/TRAINING PROGRAM

## 2025-04-18 PROCEDURE — 94761 N-INVAS EAR/PLS OXIMETRY MLT: CPT

## 2025-04-18 RX ORDER — ALBUTEROL SULFATE 2.5 MG/.5ML
2.5 SOLUTION RESPIRATORY (INHALATION) EVERY 8 HOURS PRN
Status: DISCONTINUED | OUTPATIENT
Start: 2025-04-18 | End: 2025-04-21 | Stop reason: HOSPADM

## 2025-04-18 RX ORDER — AZITHROMYCIN 250 MG/1
250 TABLET, FILM COATED ORAL DAILY
Status: DISCONTINUED | OUTPATIENT
Start: 2025-04-18 | End: 2025-04-21 | Stop reason: HOSPADM

## 2025-04-18 RX ORDER — AMOXICILLIN AND CLAVULANATE POTASSIUM 875; 125 MG/1; MG/1
1 TABLET, FILM COATED ORAL EVERY 12 HOURS
Status: DISCONTINUED | OUTPATIENT
Start: 2025-04-18 | End: 2025-04-21 | Stop reason: HOSPADM

## 2025-04-18 RX ADMIN — AZITHROMYCIN DIHYDRATE 250 MG: 250 TABLET ORAL at 10:04

## 2025-04-18 RX ADMIN — COLCHICINE 0.6 MG: 0.6 TABLET, FILM COATED ORAL at 09:04

## 2025-04-18 RX ADMIN — ALBUTEROL SULFATE 2.5 MG: 2.5 SOLUTION RESPIRATORY (INHALATION) at 12:04

## 2025-04-18 RX ADMIN — AMOXICILLIN AND CLAVULANATE POTASSIUM 1 TABLET: 875; 125 TABLET, FILM COATED ORAL at 10:04

## 2025-04-18 RX ADMIN — COLCHICINE 0.6 MG: 0.6 TABLET, FILM COATED ORAL at 08:04

## 2025-04-18 RX ADMIN — IBUPROFEN 800 MG: 400 TABLET ORAL at 05:04

## 2025-04-18 RX ADMIN — AMOXICILLIN AND CLAVULANATE POTASSIUM 1 TABLET: 875; 125 TABLET, FILM COATED ORAL at 08:04

## 2025-04-18 RX ADMIN — IBUPROFEN 800 MG: 400 TABLET ORAL at 08:04

## 2025-04-18 RX ADMIN — PANTOPRAZOLE SODIUM 40 MG: 40 TABLET, DELAYED RELEASE ORAL at 09:04

## 2025-04-18 RX ADMIN — IBUPROFEN 800 MG: 400 TABLET ORAL at 02:04

## 2025-04-18 RX ADMIN — AMPICILLIN SODIUM AND SULBACTAM SODIUM 3 G: 2; 1 INJECTION, POWDER, FOR SOLUTION INTRAMUSCULAR; INTRAVENOUS at 04:04

## 2025-04-18 RX ADMIN — LIDOCAINE 1 PATCH: 50 PATCH CUTANEOUS at 10:04

## 2025-04-18 NOTE — ASSESSMENT & PLAN NOTE
Patient found to have new pleural effusion and/or consolidation change in LLL noted on bedside echo and CXR done after removal of pericardial drain. Pt was preparing to discharge when O2 saturations dropped. Could be related to inflammatory process from pericarditis.   CT chest done - Occlusion of the left inferior lobar bronchus, with essentially complete collapse of the basal segments of the left lung. Left pleural effusion present, mucous plugging of several right lower lobe bronchioles and atelectasis.   Pulm consulted - s/p bronch  Rheum consult for eval of possible autoimmune process - numerous labs pending  Pulm recs  Echo with bubble study - no shunt  Unasyn transitioned to PO abx  F/u cultures from bronch

## 2025-04-18 NOTE — PROGRESS NOTES
[facility]  Cardiology  Progress Note    Patient Name: Shekhar Snyder  MRN: [id number]  Admission Date: 4/11/2025  Hospital Length of Stay: 7 days  Code Status: Full Code   Attending Physician: Ernesto Wing MD   Primary Care Physician: Fracisco Phipps MD  Expected Discharge Date: 4/22/2025  Principal Problem:Pericardial effusion    Subjective:     Hospital Course:   Admitted for pericarditis and pericardial effusion with tamponade physiology. 1.6L of pericardial fluid were drained by interventional cardiology. Started on ibuprofen and colchicine. Drainage improved with eventual removal of drain. Some pleural effusions were noted, however, O2 saturations remained stable. CXR ordered prior to discharge revealed improvement in pericardial effusion and new pleural reaction atelectasis and/or consolidation change in left lower lobe.     Patient was medically cleared for discharge, however, prior to discharge patient became hypoxic, CXR was done which showed pleural effusion/atelectasis and was not discharged. Pulmonology was consulted, deferred thoracentesis, recommended CT chest and initiation of antibiotics for possible aspiration in the setting of recent pericardiocentesis. Rheumatology was also consulted for evaluation of possible autoimmune processes. Pulmonology later recommended bronchoscopy, done 4/17, which showed erythematous airways and significant secretions were which were sent for cultures.     Interval History: Febrile overnight. Bronchoscopy done yesterday, pt reports improvement in SOB and chest pain (although still present). Off oxygen since yesterday.     ROS  Objective:     Vital Signs (Most Recent):  Temp: 97.9 °F (36.6 °C) (04/18/25 0720)  Pulse: 101 (04/18/25 0951)  Resp: 18 (04/18/25 0720)  BP: (!) 96/57 (04/18/25 0720)  SpO2: 99 % (04/18/25 0720) Vital Signs (24h Range):  Temp:  [97.7 °F (36.5 °C)-102.7 °F (39.3 °C)] 97.9 °F (36.6 °C)  Pulse:  [] 101  Resp:   [16-20] 18  SpO2:  [95 %-99 %] 99 %  BP: ()/(55-68) 96/57     Weight: 76.7 kg (169 lb 1.5 oz)  Body mass index is 21.14 kg/m².     SpO2: 99 %         Intake/Output Summary (Last 24 hours) at 4/18/2025 1031  Last data filed at 4/17/2025 1120  Gross per 24 hour   Intake 500 ml   Output --   Net 500 ml       Lines/Drains/Airways       Peripheral Intravenous Line  Duration                  Peripheral IV - Single Lumen 04/14/25 1441 20 G Left Antecubital 3 days                       Physical Exam  Vitals and nursing note reviewed.   Constitutional:       General: He is not in acute distress.     Appearance: Normal appearance. He is not ill-appearing.   HENT:      Head: Normocephalic and atraumatic.   Cardiovascular:      Rate and Rhythm: Normal rate and regular rhythm.   Pulmonary:      Effort: Pulmonary effort is normal. No respiratory distress.      Breath sounds: Normal breath sounds. No wheezing.   Chest:      Comments: Drain removed.  Skin:     General: Skin is warm and dry.   Neurological:      General: No focal deficit present.      Mental Status: He is oriented to person, place, and time.            Significant Labs: All pertinent lab results from the last 24 hours have been reviewed.    Significant Imaging:   X-Ray Chest AP Portable  Narrative: EXAMINATION:  XR CHEST AP PORTABLE    CLINICAL HISTORY:  Atelectasis. Post bronchoscopy;    TECHNIQUE:  Single frontal view of the chest was performed.    COMPARISON:  Non 04/14/2025 e    FINDINGS:  Mild cardiomegaly.  No significant airspace consolidation identified.  Small subsegmental atelectatic changes seen at the left lung base.  No significant pleural effusion.  Postsurgical changes involving the left clavicle as before  Impression: Improved pulmonary status as compared to the previous study.    Electronically signed by: Frantz Davies MD  Date:    04/17/2025  Time:    14:16  Echo Saline Bubble? Yes  Addendum:   Left Ventricle: The left ventricle is normal in  size. Normal wall  thickness. There is concentric remodeling. There is normal systolic  function with a visually estimated ejection fraction of 60 - 65%. There is  normal diastolic function.     Right Ventricle: The right ventricle is normal in size. Wall thickness  is normal. Systolic function is normal.     IVC/SVC: Normal venous pressure at 3 mmHg.     Pericardium: There is echodense material within the pericardial space  adjacent to the apex and the anterolateral apical segments measuring  approximately 1cm in thickness.  No significant fluid is evident and there  is no sign of hemodynamic consequence.     There is no evidence of intracardiac shunting.  Narrative:   Left Ventricle: The left ventricle is normal in size. Normal wall   thickness. There is concentric remodeling. There is normal systolic   function with a visually estimated ejection fraction of 60 - 65%. There is   normal diastolic function.    Right Ventricle: The right ventricle is normal in size. Wall thickness   is normal. Systolic function is normal.    IVC/SVC: Normal venous pressure at 3 mmHg.    Pericardium: There is echodense material within the pericardial space   adjacent to the apex and the anterolateral apical segments measuring   approximately 1cm in thickness.  No significant fluid is evident and there   is no sign of hemodynamic consequence.      Assessment and Plan:     * Pericardial effusion  Symptomatic with pain and SOB, elevations in CRP. EKG unremarkable. Hemodynamics stable, however, echo showed massive effusion, CVP 15, RV diastolic collapse and early cardiac tamponade, consequently, underwent STAT pericardiocentesis and 1.6 L fluid removed with drain left in place.   F/u studies  Continue colchicine and ibuprofen         Pleural effusion  Patient found to have new pleural effusion and/or consolidation change in LLL noted on bedside echo and CXR done after removal of pericardial drain. Pt was preparing to discharge when O2  saturations dropped. Could be related to inflammatory process from pericarditis.   CT chest done - Occlusion of the left inferior lobar bronchus, with essentially complete collapse of the basal segments of the left lung. Left pleural effusion present, mucous plugging of several right lower lobe bronchioles and atelectasis.   Pulm consulted - s/p bronch  Rheum consult for eval of possible autoimmune process - numerous labs pending  Pulm recs  Echo with bubble study - no shunt  Unasyn transitioned to PO abx  F/u cultures from bronch      Pericarditis  Sent to hospital from outpatient cardiology follow up for initial episode of pericarditis from March. Etiology unknown, thought to be 2/2 to influenza infection from February. Incomplete adherence to ibuprofen and colchicine 2/2 GI side effects. Unclear if current presentation is recurrent pericarditis vs incompletely treated primary episode.    Continue ibuprofen and colchicine  PPI  If no resolution of symptoms, can consider steroid course        VTE Risk Mitigation (From admission, onward)           Ordered     IP VTE HIGH RISK PATIENT  Once         04/11/25 2151     Place sequential compression device  Until discontinued         04/11/25 2151                    Rowena Marquez DO  Cardiology  [facility]

## 2025-04-18 NOTE — SUBJECTIVE & OBJECTIVE
Interval History: Febrile overnight. Bronchoscopy done yesterday, pt reports improvement in SOB and chest pain (although still present). Off oxygen since yesterday.     ROS  Objective:     Vital Signs (Most Recent):  Temp: 97.9 °F (36.6 °C) (04/18/25 0720)  Pulse: 101 (04/18/25 0951)  Resp: 18 (04/18/25 0720)  BP: (!) 96/57 (04/18/25 0720)  SpO2: 99 % (04/18/25 0720) Vital Signs (24h Range):  Temp:  [97.7 °F (36.5 °C)-102.7 °F (39.3 °C)] 97.9 °F (36.6 °C)  Pulse:  [] 101  Resp:  [16-20] 18  SpO2:  [95 %-99 %] 99 %  BP: ()/(55-68) 96/57     Weight: 76.7 kg (169 lb 1.5 oz)  Body mass index is 21.14 kg/m².     SpO2: 99 %         Intake/Output Summary (Last 24 hours) at 4/18/2025 1031  Last data filed at 4/17/2025 1120  Gross per 24 hour   Intake 500 ml   Output --   Net 500 ml       Lines/Drains/Airways       Peripheral Intravenous Line  Duration                  Peripheral IV - Single Lumen 04/14/25 1441 20 G Left Antecubital 3 days                       Physical Exam  Vitals and nursing note reviewed.   Constitutional:       General: He is not in acute distress.     Appearance: Normal appearance. He is not ill-appearing.   HENT:      Head: Normocephalic and atraumatic.   Cardiovascular:      Rate and Rhythm: Normal rate and regular rhythm.   Pulmonary:      Effort: Pulmonary effort is normal. No respiratory distress.      Breath sounds: Normal breath sounds. No wheezing.   Chest:      Comments: Drain removed.  Skin:     General: Skin is warm and dry.   Neurological:      General: No focal deficit present.      Mental Status: He is oriented to person, place, and time.            Significant Labs: All pertinent lab results from the last 24 hours have been reviewed.    Significant Imaging:   X-Ray Chest AP Portable  Narrative: EXAMINATION:  XR CHEST AP PORTABLE    CLINICAL HISTORY:  Atelectasis. Post bronchoscopy;    TECHNIQUE:  Single frontal view of the chest was performed.    COMPARISON:  Non 04/14/2025  e    FINDINGS:  Mild cardiomegaly.  No significant airspace consolidation identified.  Small subsegmental atelectatic changes seen at the left lung base.  No significant pleural effusion.  Postsurgical changes involving the left clavicle as before  Impression: Improved pulmonary status as compared to the previous study.    Electronically signed by: Frantz Davies MD  Date:    04/17/2025  Time:    14:16  Echo Saline Bubble? Yes  Addendum:   Left Ventricle: The left ventricle is normal in size. Normal wall  thickness. There is concentric remodeling. There is normal systolic  function with a visually estimated ejection fraction of 60 - 65%. There is  normal diastolic function.     Right Ventricle: The right ventricle is normal in size. Wall thickness  is normal. Systolic function is normal.     IVC/SVC: Normal venous pressure at 3 mmHg.     Pericardium: There is echodense material within the pericardial space  adjacent to the apex and the anterolateral apical segments measuring  approximately 1cm in thickness.  No significant fluid is evident and there  is no sign of hemodynamic consequence.     There is no evidence of intracardiac shunting.  Narrative:   Left Ventricle: The left ventricle is normal in size. Normal wall   thickness. There is concentric remodeling. There is normal systolic   function with a visually estimated ejection fraction of 60 - 65%. There is   normal diastolic function.    Right Ventricle: The right ventricle is normal in size. Wall thickness   is normal. Systolic function is normal.    IVC/SVC: Normal venous pressure at 3 mmHg.    Pericardium: There is echodense material within the pericardial space   adjacent to the apex and the anterolateral apical segments measuring   approximately 1cm in thickness.  No significant fluid is evident and there   is no sign of hemodynamic consequence.

## 2025-04-18 NOTE — PROGRESS NOTES
"[facility]  Rheumatology  Progress Note    Patient Name: Shekhar Snyder  MRN: [id number]  Admission Date: 4/11/2025  Hospital Length of Stay: 7 days  Code Status: Full Code   Attending Provider: Ernesto Wing MD  Primary Care Physician: Fracisco Phipps MD  Principal Problem: Pericardial effusion    Subjective:     HPI: Shekhar Snyder is a 23yo M with PMH of post viral/Influenza A pericarditis, mild intermittent asthma, daily marijuana use, prior tobacco/nicotine use (quit 2-3 mos ago), ADHD, PTSD, depression.    Recent Pertinent History:  - 2/6/25: Seen in urgent care and dx with Influenza A - with symptoms of headaches, myalgia, nausea, vomiting, diarrhea, cough, congestion, MCCALLUM x 3-5 days  - 3/10-12/25: Admitted with complaints of mid sternal chest pain/tightness, dyspnea, lightheadedness x 4 days, symptoms worse with lying back, leaning forward, exertion, or deep inhalation - managed for suspected post viral pericarditis, with NSAIDs and colchicine  - Echo (3/11/25) with moderate pericardial effusion without tamponade  - Developed GI side effects with high dose ibuprofen, so decreased and stopped it eventually  - Symptoms never completely resolved but had improved in the interim    Current Hospitalization (4/11/25-current):  - Seen by outpt cardiology on 4/11 and found to have persistent vs recurrent large effusion, now with tamponade  - Therefore admitted to hospital for acute pericarditis/effusion/tamponade management  - 4/11: taken to cath lab for pericardiocentesis, s/p removal of 1500cc serous fluid  - Restarted high dose NSAIDs (ibuprofen 800mg Q8h) and colchicine 0.6mg BID  - Pt was going to be d/c'd on 4/14 but got hypoxic and found to have pleural effusion on US and CXR, so stayed in hospital  - Pulmonology evaluated, no indication for thoracentesis at this time    Rheumatology was consulted for "Recurrent pericarditis, pleural effusion, and family hx of pericarditis. Concerns for " "autoimmune process? Connective tissue disorder?"    On initial evaluation, pt endorses the above hx. History is obtained from pt and his mother at bedside. Continues to have some dyspnea, cough, and chest tightness, O2 sats drop with any exertion. Denies any current or recent h/o fevers, chills, other infectious/sick contacts (other than prior flu in February), nausea, vomiting, GI changes, urinary changes, joint pain, joint swelling, joint stiffness (other that occasional mild ankle stiffness), skin rash/changes, oral/nasal ulcers, inflammatory eye problems, alopecia. Pt endorses some light sensitivity in his eyes.     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 Pt has continued to improve after undergoing bronch yesterday. Off O2.     Current Facility-Administered Medications   Medication Frequency    acetaminophen tablet 650 mg Q4H PRN    albuterol inhaler 2 puff Q4H PRN    albuterol sulfate nebulizer solution 2.5 mg Q8H PRN    amoxicillin-clavulanate 875-125mg per tablet 1 tablet Q12H    azithromycin tablet 250 mg Daily    colchicine capsule/tablet 0.6 mg BID    dextrose 50% injection 12.5 g PRN    dextrose 50% injection 12.5 g PRN    fentaNYL 50 mcg/mL injection 25 mcg Q5 Min PRN    fluticasone furoate-vilanteroL 200-25 mcg/dose diskus inhaler 1 puff Daily    glucagon (human recombinant) injection 1 mg PRN    glucagon (human recombinant) injection 1 mg PRN    haloperidol lactate injection 0.5 mg Q10 Min PRN    HYDROmorphone injection 0.2 mg Q5 Min PRN    ibuprofen tablet 800 mg Q8H    LIDOcaine 5 % patch 1 patch Q24H    ondansetron disintegrating tablet 8 mg Q8H PRN    oxyCODONE " immediate release tablet 5 mg Q3H PRN    pantoprazole EC tablet 40 mg Daily    prochlorperazine injection Soln 5 mg Q30 Min PRN    sodium chloride 0.9% flush 10 mL PRN    sodium chloride 0.9% flush 10 mL PRN     Objective:     Vital Signs (Most Recent):  Temp: 97.9 °F (36.6 °C) (04/18/25 0720)  Pulse: 85 (04/18/25 1042)  Resp: 18 (04/18/25 0720)  BP: (!) 96/57 (04/18/25 0720)  SpO2: 99 % (04/18/25 0720) Vital Signs (24h Range):  Temp:  [97.7 °F (36.5 °C)-102.7 °F (39.3 °C)] 97.9 °F (36.6 °C)  Pulse:  [] 85  Resp:  [16-20] 18  SpO2:  [95 %-99 %] 99 %  BP: ()/(55-68) 96/57     Weight: 76.7 kg (169 lb 1.5 oz) (04/17/25 0933)  Body mass index is 21.14 kg/m².  Body surface area is 2.01 meters squared.      Intake/Output Summary (Last 24 hours) at 4/18/2025 1043  Last data filed at 4/17/2025 1120  Gross per 24 hour   Intake 500 ml   Output --   Net 500 ml        Physical Exam   Constitutional: He is oriented to person, place, and time. He appears well-developed and well-nourished. No distress.   HENT:   Head: Normocephalic and atraumatic.   Right Ear: External ear normal.   Left Ear: External ear normal.   Nose: Nose normal. No nasal congestion.   Mouth/Throat: Oropharynx is clear and moist. Mucous membranes are moist. Oropharynx is clear.   Eyes: Conjunctivae are normal.   Cardiovascular: Normal rate and regular rhythm.   Pulmonary/Chest: Effort normal. No respiratory distress. He has no wheezes.   Abdominal: Soft. He exhibits no distension. There is no abdominal tenderness.   Musculoskeletal:         General: No swelling or tenderness. Normal range of motion.      Cervical back: Normal range of motion and neck supple.      Comments: No acute synovitis in any of the small or large joints.   Neurological: He is alert and oriented to person, place, and time.   Skin: Skin is warm and dry. No lesion and no rash noted.   Psychiatric: His behavior is normal. Mood normal.   Vitals reviewed.       Significant  Labs:  All pertinent lab results from the last 24 hours have been reviewed.    Significant Imaging:  Imaging results within the past 24 hours have been reviewed.  Assessment/Plan:     Cardiac/Vascular  Pericarditis  Shekhar Snyder is a 25yo M with PMH of post viral/Influenza A pericarditis, mild intermittent asthma, daily marijuana use, prior tobacco/nicotine use (quit 2-3 mos ago), ADHD, PTSD, depression.    - Pt with acute pericarditis (initially dx 3/2025), suspected to be post viral after Influenza A infection 1 month prior to initial episode  - Pt did not complete full course/tx with high dose NSAIDs and colchicine  - Then found to have large pericardial effusion w/ tamponade requiring pericardiocentesis now (4/11/25)  - Symptoms initially improved, but have not resolved - with persistent chest pain/tightness, dyspnea, and cough  - Now with small pleural effusion as well, pulm feels some mucus plugging and atelectasis  - Pt with no other current symptoms indicative of a systemic autoimmune rheumatologic process  - Labs showed negative DOM/EMERY profile (DOM was only 1:80)  - Pulmonology following and completed bronch on 4/17 - mucus plugging and purulent secretions cleared, likely infectious in nature    Plan/Recommendations:  - Agree with acute pericarditis management per cardio with high dose NSAIDs/colchicine and abx for pna per pulm/primary teams  - If symptoms do not resolve and/or pt does develop recurrent episode despite full adequate tx with NSAIDs/colchicine - cardio could consider use of IL-1 inhibitor  - Follow up on ordered/pending lab workup  - Consider obtaining autoinflammatory gene panel especially with known family hx of recurrent pericarditis - outpatient e-consult to genetics has been placed to evaluate for this  - No additional inpatient recommendations, pt ok to discharge from rheum perspective      Assessment and plan discussed with supervising attending, Dr. Tripp. Please do not hesitate to  reach out with any questions or concerns.      Navya Romeo MD  PGY-5, Rheumatology

## 2025-04-19 LAB
ABSOLUTE EOSINOPHIL (OHS): 0.5 K/UL
ABSOLUTE MONOCYTE (OHS): 1.47 K/UL (ref 0.3–1)
ABSOLUTE NEUTROPHIL COUNT (OHS): 7.63 K/UL (ref 1.8–7.7)
ALBUMIN SERPL BCP-MCNC: 2.9 G/DL (ref 3.5–5.2)
ALP SERPL-CCNC: 62 UNIT/L (ref 40–150)
ALT SERPL W/O P-5'-P-CCNC: 26 UNIT/L (ref 10–44)
ANION GAP (OHS): 10 MMOL/L (ref 8–16)
AST SERPL-CCNC: 21 UNIT/L (ref 11–45)
BACTERIA SPEC CULT: NO GROWTH
BACTERIA SPT CULT: NORMAL
BASOPHILS # BLD AUTO: 0.04 K/UL
BASOPHILS NFR BLD AUTO: 0.3 %
BILIRUB SERPL-MCNC: 0.2 MG/DL (ref 0.1–1)
BUN SERPL-MCNC: 12 MG/DL (ref 6–20)
CALCIUM SERPL-MCNC: 9.1 MG/DL (ref 8.7–10.5)
CHLORIDE SERPL-SCNC: 105 MMOL/L (ref 95–110)
CO2 SERPL-SCNC: 25 MMOL/L (ref 23–29)
CREAT SERPL-MCNC: 0.7 MG/DL (ref 0.5–1.4)
CRP SERPL-MCNC: 114.46 MG/L
ERYTHROCYTE [DISTWIDTH] IN BLOOD BY AUTOMATED COUNT: 13.2 % (ref 11.5–14.5)
GFR SERPLBLD CREATININE-BSD FMLA CKD-EPI: >60 ML/MIN/1.73/M2
GLUCOSE SERPL-MCNC: 98 MG/DL (ref 70–110)
GRAM STN SPEC: NORMAL
HCT VFR BLD AUTO: 39 % (ref 40–54)
HGB BLD-MCNC: 12.2 GM/DL (ref 14–18)
IMM GRANULOCYTES # BLD AUTO: 0.06 K/UL (ref 0–0.04)
IMM GRANULOCYTES NFR BLD AUTO: 0.5 % (ref 0–0.5)
LYMPHOCYTES # BLD AUTO: 2.91 K/UL (ref 1–4.8)
MAGNESIUM SERPL-MCNC: 2 MG/DL (ref 1.6–2.6)
MCH RBC QN AUTO: 27.4 PG (ref 27–31)
MCHC RBC AUTO-ENTMCNC: 31.3 G/DL (ref 32–36)
MCV RBC AUTO: 87 FL (ref 82–98)
NUCLEATED RBC (/100WBC) (OHS): 0 /100 WBC
OHS QRS DURATION: 92 MS
OHS QTC CALCULATION: 441 MS
PLATELET # BLD AUTO: 382 K/UL (ref 150–450)
PMV BLD AUTO: 11 FL (ref 9.2–12.9)
POTASSIUM SERPL-SCNC: 4.4 MMOL/L (ref 3.5–5.1)
PROT SERPL-MCNC: 6.6 GM/DL (ref 6–8.4)
RBC # BLD AUTO: 4.46 M/UL (ref 4.6–6.2)
RELATIVE EOSINOPHIL (OHS): 4 %
RELATIVE LYMPHOCYTE (OHS): 23.1 % (ref 18–48)
RELATIVE MONOCYTE (OHS): 11.7 % (ref 4–15)
RELATIVE NEUTROPHIL (OHS): 60.4 % (ref 38–73)
SODIUM SERPL-SCNC: 140 MMOL/L (ref 136–145)
WBC # BLD AUTO: 12.61 K/UL (ref 3.9–12.7)

## 2025-04-19 PROCEDURE — 93005 ELECTROCARDIOGRAM TRACING: CPT

## 2025-04-19 PROCEDURE — 85025 COMPLETE CBC W/AUTO DIFF WBC: CPT | Performed by: STUDENT IN AN ORGANIZED HEALTH CARE EDUCATION/TRAINING PROGRAM

## 2025-04-19 PROCEDURE — 25000242 PHARM REV CODE 250 ALT 637 W/ HCPCS: Performed by: INTERNAL MEDICINE

## 2025-04-19 PROCEDURE — 25000003 PHARM REV CODE 250: Performed by: STUDENT IN AN ORGANIZED HEALTH CARE EDUCATION/TRAINING PROGRAM

## 2025-04-19 PROCEDURE — 36415 COLL VENOUS BLD VENIPUNCTURE: CPT | Performed by: STUDENT IN AN ORGANIZED HEALTH CARE EDUCATION/TRAINING PROGRAM

## 2025-04-19 PROCEDURE — 83735 ASSAY OF MAGNESIUM: CPT | Performed by: STUDENT IN AN ORGANIZED HEALTH CARE EDUCATION/TRAINING PROGRAM

## 2025-04-19 PROCEDURE — 25000003 PHARM REV CODE 250

## 2025-04-19 PROCEDURE — 80053 COMPREHEN METABOLIC PANEL: CPT | Performed by: STUDENT IN AN ORGANIZED HEALTH CARE EDUCATION/TRAINING PROGRAM

## 2025-04-19 PROCEDURE — 94640 AIRWAY INHALATION TREATMENT: CPT

## 2025-04-19 PROCEDURE — 93010 ELECTROCARDIOGRAM REPORT: CPT | Mod: ,,, | Performed by: INTERNAL MEDICINE

## 2025-04-19 PROCEDURE — 63700000 PHARM REV CODE 250 ALT 637 W/O HCPCS

## 2025-04-19 PROCEDURE — 99900035 HC TECH TIME PER 15 MIN (STAT)

## 2025-04-19 PROCEDURE — 99231 SBSQ HOSP IP/OBS SF/LOW 25: CPT | Mod: ,,, | Performed by: INTERNAL MEDICINE

## 2025-04-19 PROCEDURE — 86141 C-REACTIVE PROTEIN HS: CPT | Performed by: STUDENT IN AN ORGANIZED HEALTH CARE EDUCATION/TRAINING PROGRAM

## 2025-04-19 PROCEDURE — 94761 N-INVAS EAR/PLS OXIMETRY MLT: CPT

## 2025-04-19 PROCEDURE — 20600001 HC STEP DOWN PRIVATE ROOM

## 2025-04-19 RX ADMIN — AMOXICILLIN AND CLAVULANATE POTASSIUM 1 TABLET: 875; 125 TABLET, FILM COATED ORAL at 09:04

## 2025-04-19 RX ADMIN — ACETAMINOPHEN 650 MG: 325 TABLET ORAL at 12:04

## 2025-04-19 RX ADMIN — PANTOPRAZOLE SODIUM 40 MG: 40 TABLET, DELAYED RELEASE ORAL at 09:04

## 2025-04-19 RX ADMIN — AZITHROMYCIN DIHYDRATE 250 MG: 250 TABLET ORAL at 09:04

## 2025-04-19 RX ADMIN — COLCHICINE 0.6 MG: 0.6 TABLET, FILM COATED ORAL at 09:04

## 2025-04-19 RX ADMIN — IBUPROFEN 800 MG: 400 TABLET ORAL at 01:04

## 2025-04-19 RX ADMIN — FLUTICASONE FUROATE AND VILANTEROL TRIFENATATE 1 PUFF: 200; 25 POWDER RESPIRATORY (INHALATION) at 09:04

## 2025-04-19 RX ADMIN — IBUPROFEN 800 MG: 400 TABLET ORAL at 09:04

## 2025-04-19 RX ADMIN — LIDOCAINE 1 PATCH: 50 PATCH CUTANEOUS at 09:04

## 2025-04-19 NOTE — PLAN OF CARE
[facility]  Discharge Final Note    Primary Care Provider: Fracisco Phipps MD    Expected Discharge Date: 4/19/2025    Final Discharge Note (most recent)       Final Note - 04/19/25 1258          Final Note    Assessment Type Final Discharge Note     Anticipated Discharge Disposition Home or Self Care     What phone number can be called within the next 1-3 days to see how you are doing after discharge? [phone] (P)         Post-Acute Status    Discharge Delays None known at this time (P)                      Contact Info       Jonathon Kelly MD   Specialty: Interventional Cardiology, Cardiology    [address] [facility]  [address]  [place] [address]   Phone: [phone]       Next Steps: Schedule an appointment as soon as possible for a visit in 1 week(s)    Instructions: Hospital Follow up    Fracisco Phipps MD   Specialty: Internal Medicine   Relationship: PCP - General    [address] [facility]  [address]  [place] [address]   Phone: [phone]       Next Steps: Follow up on 4/21/2025    Instructions: Hospital follow up established patient @ 1:00 pm. Please bring discharge summary, ID, insurance card, and medication list.          Pt to DC with no further acute needs, follow up appt scheduled. Family will provide transportation.    Tori JARAMILLO,   Case Management   [phone]

## 2025-04-19 NOTE — ASSESSMENT & PLAN NOTE
Sent to hospital from outpatient cardiology follow up for initial episode of pericarditis from March. Etiology unknown, thought to be 2/2 to influenza infection from February. Incomplete adherence to ibuprofen and colchicine 2/2 GI side effects. Unclear if current presentation is recurrent pericarditis vs incompletely treated primary episode.    Given increasing inflammatory markers, can consider using steroids. Discussed with outpatient cardiologist regarding initiation of steroids vs IL1 inhibitor - at the moment will hold off on escalating to steroids as may increase risk for recurrent pericarditis.   Continue ibuprofen and colchicine  PPI  Consider steroids if clinical course does not improve

## 2025-04-19 NOTE — PROGRESS NOTES
[facility]  Cardiology  Progress Note    Patient Name: Shekhar Snyder  MRN: [id number]  Admission Date: 4/11/2025  Hospital Length of Stay: 8 days  Code Status: Full Code   Attending Physician: Oliver Moctezuma MD   Primary Care Physician: Fracisco Phipps MD  Expected Discharge Date: 4/19/2025  Principal Problem:Pericardial effusion    Subjective:     Hospital Course:   Admitted for pericarditis and pericardial effusion with tamponade physiology. 1.6L of pericardial fluid were drained by interventional cardiology. Started on ibuprofen and colchicine. Drainage improved with eventual removal of drain. Some pleural effusions were noted, however, O2 saturations remained stable. CXR ordered prior to discharge revealed improvement in pericardial effusion and new pleural reaction atelectasis and/or consolidation change in left lower lobe.     Patient was medically cleared for discharge, however, prior to discharge patient became hypoxic, CXR was done which showed pleural effusion/atelectasis and was not discharged. Pulmonology was consulted, deferred thoracentesis, recommended CT chest and initiation of antibiotics for possible aspiration in the setting of recent pericardiocentesis. Rheumatology was also consulted for evaluation of possible autoimmune processes. Pulmonology later recommended bronchoscopy, done 4/17, which showed erythematous airways and significant secretions were which were sent for cultures.     CRP were trended throughout stay with fluctuations prompting considerations for initiation of prednisone, also discussed with outpatient cardiologist. Decision was made to continue colchicine and ibuprofen. Bedside ultrasound was done to evaluate pericardial effusion which showed increase in pleural effusion, planned to continue watching.    Interval History: Pt endorsed chest pain returning this morning. CRP decreasing, temperature elevated in the morning. Labs stable.     ROS  Objective:      Vital Signs (Most Recent):  Temp: 98.9 °F (37.2 °C) (04/19/25 1518)  Pulse: 103 (04/19/25 1518)  Resp: 18 (04/19/25 1518)  BP: 102/64 (04/19/25 1518)  SpO2: 97 % (04/19/25 1518) Vital Signs (24h Range):  Temp:  [97.9 °F (36.6 °C)-100.2 °F (37.9 °C)] 98.9 °F (37.2 °C)  Pulse:  [] 103  Resp:  [16-18] 18  SpO2:  [94 %-98 %] 97 %  BP: (102-124)/(58-72) 102/64     Weight: 76.7 kg (169 lb 1.5 oz)  Body mass index is 21.14 kg/m².     SpO2: 97 %       No intake or output data in the 24 hours ending 04/19/25 1528    Lines/Drains/Airways       Peripheral Intravenous Line  Duration                  Peripheral IV - Single Lumen 04/14/25 1441 20 G Left Antecubital 5 days                       Physical Exam  Vitals and nursing note reviewed.   Constitutional:       General: He is not in acute distress.     Appearance: Normal appearance. He is not ill-appearing.   HENT:      Head: Normocephalic and atraumatic.   Cardiovascular:      Rate and Rhythm: Normal rate and regular rhythm.   Pulmonary:      Effort: Pulmonary effort is normal. No respiratory distress.      Breath sounds: Normal breath sounds. No wheezing.      Comments: Bedside US showed enlarged pleural effusion, no respiratory symptoms at this time.   Chest:      Comments: Drain removed.  Skin:     General: Skin is warm and dry.   Neurological:      General: No focal deficit present.      Mental Status: He is oriented to person, place, and time.            Significant Labs: All pertinent lab results from the last 24 hours have been reviewed.    Assessment and Plan:     * Pericardial effusion  Symptomatic with pain and SOB, elevations in CRP. EKG unremarkable. Hemodynamics stable, however, echo showed massive effusion, CVP 15, RV diastolic collapse and early cardiac tamponade, consequently, underwent STAT pericardiocentesis and 1.6 L fluid removed with drain left in place.   F/u studies  Continue colchicine and ibuprofen         Pleural effusion  Patient found to  have new pleural effusion and/or consolidation change in LLL noted on bedside echo and CXR done after removal of pericardial drain. Pt was preparing to discharge when O2 saturations dropped. Could be related to inflammatory process from pericarditis.   CT chest done - Occlusion of the left inferior lobar bronchus, with essentially complete collapse of the basal segments of the left lung. Left pleural effusion present, mucous plugging of several right lower lobe bronchioles and atelectasis.   Pulm consulted - s/p bronch  Rheum consult for eval of possible autoimmune process - numerous labs pending  Pulm recs  Echo with bubble study - no shunt  Unasyn transitioned to PO abx  F/u cultures from bronch      Pericarditis  Sent to hospital from outpatient cardiology follow up for initial episode of pericarditis from March. Etiology unknown, thought to be 2/2 to influenza infection from February. Incomplete adherence to ibuprofen and colchicine 2/2 GI side effects. Unclear if current presentation is recurrent pericarditis vs incompletely treated primary episode.    Given increasing inflammatory markers, can consider using steroids. Discussed with outpatient cardiologist regarding initiation of steroids vs IL1 inhibitor - at the moment will hold off on escalating to steroids as may increase risk for recurrent pericarditis.   Continue ibuprofen and colchicine  PPI  Consider steroids if clinical course does not improve        VTE Risk Mitigation (From admission, onward)           Ordered     IP VTE HIGH RISK PATIENT  Once         04/11/25 2151     Place sequential compression device  Until discontinued         04/11/25 2151                    Rowena Marquez,   Cardiology  [facility]

## 2025-04-19 NOTE — SUBJECTIVE & OBJECTIVE
Interval History: Pt endorsed chest pain returning this morning. CRP decreasing, temperature elevated in the morning. Labs stable.     ROS  Objective:     Vital Signs (Most Recent):  Temp: 98.9 °F (37.2 °C) (04/19/25 1518)  Pulse: 103 (04/19/25 1518)  Resp: 18 (04/19/25 1518)  BP: 102/64 (04/19/25 1518)  SpO2: 97 % (04/19/25 1518) Vital Signs (24h Range):  Temp:  [97.9 °F (36.6 °C)-100.2 °F (37.9 °C)] 98.9 °F (37.2 °C)  Pulse:  [] 103  Resp:  [16-18] 18  SpO2:  [94 %-98 %] 97 %  BP: (102-124)/(58-72) 102/64     Weight: 76.7 kg (169 lb 1.5 oz)  Body mass index is 21.14 kg/m².     SpO2: 97 %       No intake or output data in the 24 hours ending 04/19/25 1528    Lines/Drains/Airways       Peripheral Intravenous Line  Duration                  Peripheral IV - Single Lumen 04/14/25 1441 20 G Left Antecubital 5 days                       Physical Exam  Vitals and nursing note reviewed.   Constitutional:       General: He is not in acute distress.     Appearance: Normal appearance. He is not ill-appearing.   HENT:      Head: Normocephalic and atraumatic.   Cardiovascular:      Rate and Rhythm: Normal rate and regular rhythm.   Pulmonary:      Effort: Pulmonary effort is normal. No respiratory distress.      Breath sounds: Normal breath sounds. No wheezing.      Comments: Bedside US showed enlarged pleural effusion, no respiratory symptoms at this time.   Chest:      Comments: Drain removed.  Skin:     General: Skin is warm and dry.   Neurological:      General: No focal deficit present.      Mental Status: He is oriented to person, place, and time.            Significant Labs: All pertinent lab results from the last 24 hours have been reviewed.

## 2025-04-19 NOTE — PLAN OF CARE
Plan of care discussed with patient.  Patient ambulating independently, fall precautions in place.  Patient c/o chest pain, stat EKG was ordered. Chets xray ordered, prn pain medication administered. Lidocaine patch applied for chest pain.Discussed medications and care. Patient has no questions at this time. Will continue to monitor.         Problem: Adult Inpatient Plan of Care  Goal: Plan of Care Review  Outcome: Progressing  Goal: Patient-Specific Goal (Individualized)  Outcome: Progressing  Goal: Absence of Hospital-Acquired Illness or Injury  Outcome: Progressing  Goal: Optimal Comfort and Wellbeing  Outcome: Progressing  Goal: Readiness for Transition of Care  Outcome: Progressing

## 2025-04-20 LAB
ABSOLUTE EOSINOPHIL (OHS): 0.25 K/UL
ABSOLUTE MONOCYTE (OHS): 1.43 K/UL (ref 0.3–1)
ABSOLUTE NEUTROPHIL COUNT (OHS): 8.19 K/UL (ref 1.8–7.7)
ALBUMIN SERPL BCP-MCNC: 3.1 G/DL (ref 3.5–5.2)
ALP SERPL-CCNC: 66 UNIT/L (ref 40–150)
ALT SERPL W/O P-5'-P-CCNC: 25 UNIT/L (ref 10–44)
ANION GAP (OHS): 10 MMOL/L (ref 8–16)
AST SERPL-CCNC: 16 UNIT/L (ref 11–45)
BASOPHILS # BLD AUTO: 0.04 K/UL
BASOPHILS NFR BLD AUTO: 0.3 %
BILIRUB SERPL-MCNC: 0.5 MG/DL (ref 0.1–1)
BUN SERPL-MCNC: 13 MG/DL (ref 6–20)
CALCIUM SERPL-MCNC: 9.4 MG/DL (ref 8.7–10.5)
CHLORIDE SERPL-SCNC: 102 MMOL/L (ref 95–110)
CO2 SERPL-SCNC: 26 MMOL/L (ref 23–29)
CREAT SERPL-MCNC: 0.8 MG/DL (ref 0.5–1.4)
CRP SERPL-MCNC: 173.76 MG/L
ERYTHROCYTE [DISTWIDTH] IN BLOOD BY AUTOMATED COUNT: 13.2 % (ref 11.5–14.5)
GFR SERPLBLD CREATININE-BSD FMLA CKD-EPI: >60 ML/MIN/1.73/M2
GLUCOSE SERPL-MCNC: 88 MG/DL (ref 70–110)
HCT VFR BLD AUTO: 41.2 % (ref 40–54)
HGB BLD-MCNC: 13 GM/DL (ref 14–18)
IMM GRANULOCYTES # BLD AUTO: 0.04 K/UL (ref 0–0.04)
IMM GRANULOCYTES NFR BLD AUTO: 0.3 % (ref 0–0.5)
LYMPHOCYTES # BLD AUTO: 2.23 K/UL (ref 1–4.8)
MAGNESIUM SERPL-MCNC: 2 MG/DL (ref 1.6–2.6)
MCH RBC QN AUTO: 27.6 PG (ref 27–31)
MCHC RBC AUTO-ENTMCNC: 31.6 G/DL (ref 32–36)
MCV RBC AUTO: 88 FL (ref 82–98)
NUCLEATED RBC (/100WBC) (OHS): 0 /100 WBC
PLATELET # BLD AUTO: 408 K/UL (ref 150–450)
PMV BLD AUTO: 10.9 FL (ref 9.2–12.9)
POTASSIUM SERPL-SCNC: 4.1 MMOL/L (ref 3.5–5.1)
PROT SERPL-MCNC: 7.4 GM/DL (ref 6–8.4)
RBC # BLD AUTO: 4.71 M/UL (ref 4.6–6.2)
RELATIVE EOSINOPHIL (OHS): 2.1 %
RELATIVE LYMPHOCYTE (OHS): 18.3 % (ref 18–48)
RELATIVE MONOCYTE (OHS): 11.7 % (ref 4–15)
RELATIVE NEUTROPHIL (OHS): 67.3 % (ref 38–73)
SODIUM SERPL-SCNC: 138 MMOL/L (ref 136–145)
WBC # BLD AUTO: 12.18 K/UL (ref 3.9–12.7)

## 2025-04-20 PROCEDURE — 25000003 PHARM REV CODE 250

## 2025-04-20 PROCEDURE — 99231 SBSQ HOSP IP/OBS SF/LOW 25: CPT | Mod: ,,, | Performed by: INTERNAL MEDICINE

## 2025-04-20 PROCEDURE — 63700000 PHARM REV CODE 250 ALT 637 W/O HCPCS

## 2025-04-20 PROCEDURE — 36415 COLL VENOUS BLD VENIPUNCTURE: CPT | Performed by: STUDENT IN AN ORGANIZED HEALTH CARE EDUCATION/TRAINING PROGRAM

## 2025-04-20 PROCEDURE — 63600175 PHARM REV CODE 636 W HCPCS: Performed by: STUDENT IN AN ORGANIZED HEALTH CARE EDUCATION/TRAINING PROGRAM

## 2025-04-20 PROCEDURE — 25000003 PHARM REV CODE 250: Performed by: STUDENT IN AN ORGANIZED HEALTH CARE EDUCATION/TRAINING PROGRAM

## 2025-04-20 PROCEDURE — 20600001 HC STEP DOWN PRIVATE ROOM

## 2025-04-20 RX ORDER — PREDNISONE 20 MG/1
40 TABLET ORAL DAILY
Status: DISCONTINUED | OUTPATIENT
Start: 2025-04-20 | End: 2025-04-21 | Stop reason: HOSPADM

## 2025-04-20 RX ADMIN — COLCHICINE 0.6 MG: 0.6 TABLET, FILM COATED ORAL at 08:04

## 2025-04-20 RX ADMIN — AMOXICILLIN AND CLAVULANATE POTASSIUM 1 TABLET: 875; 125 TABLET, FILM COATED ORAL at 10:04

## 2025-04-20 RX ADMIN — COLCHICINE 0.6 MG: 0.6 TABLET, FILM COATED ORAL at 10:04

## 2025-04-20 RX ADMIN — PANTOPRAZOLE SODIUM 40 MG: 40 TABLET, DELAYED RELEASE ORAL at 08:04

## 2025-04-20 RX ADMIN — PREDNISONE 40 MG: 20 TABLET ORAL at 09:04

## 2025-04-20 RX ADMIN — IBUPROFEN 800 MG: 400 TABLET ORAL at 06:04

## 2025-04-20 RX ADMIN — FLUTICASONE FUROATE AND VILANTEROL TRIFENATATE 1 PUFF: 200; 25 POWDER RESPIRATORY (INHALATION) at 08:04

## 2025-04-20 RX ADMIN — AMOXICILLIN AND CLAVULANATE POTASSIUM 1 TABLET: 875; 125 TABLET, FILM COATED ORAL at 08:04

## 2025-04-20 RX ADMIN — AZITHROMYCIN DIHYDRATE 250 MG: 250 TABLET ORAL at 08:04

## 2025-04-20 NOTE — SUBJECTIVE & OBJECTIVE
Interval History: Pt endorsed chest pain better. CRP increasing 149, 114, 173. Tmax 100.2 twice in last 24 hours.  Xray Chest showed left pleural effusion, Denies SOB.    Review of Systems   Constitutional: Negative for chills and fever.   HENT:  Negative for congestion.    Eyes:  Negative for blurred vision and visual disturbance.   Cardiovascular:  Positive for chest pain. Negative for dyspnea on exertion, irregular heartbeat, leg swelling, near-syncope, orthopnea, palpitations, paroxysmal nocturnal dyspnea and syncope.   Respiratory:  Negative for cough, shortness of breath, sputum production and wheezing.    Skin:  Negative for color change.   Musculoskeletal:  Negative for arthritis and muscle weakness.   Gastrointestinal:  Negative for bloating, abdominal pain, change in bowel habit, nausea and vomiting.   Genitourinary:  Negative for dysuria.   Neurological:  Negative for dizziness and light-headedness.   Psychiatric/Behavioral:  The patient is not nervous/anxious.      Objective:     Vital Signs (Most Recent):  Temp: 100.2 °F (37.9 °C) (04/20/25 0724)  Pulse: (!) 118 (04/20/25 0726)  Resp: 19 (04/20/25 0724)  BP: 109/60 (04/20/25 0724)  SpO2: 95 % (04/20/25 0724) Vital Signs (24h Range):  Temp:  [98.1 °F (36.7 °C)-100.2 °F (37.9 °C)] 100.2 °F (37.9 °C)  Pulse:  [] 118  Resp:  [16-19] 19  SpO2:  [94 %-98 %] 95 %  BP: (102-118)/(58-77) 109/60     Weight: 76 kg (167 lb 8.8 oz)  Body mass index is 20.94 kg/m².     SpO2: 95 %       No intake or output data in the 24 hours ending 04/20/25 0731    Lines/Drains/Airways       Peripheral Intravenous Line  Duration                  Peripheral IV - Single Lumen 04/14/25 1441 20 G Left Antecubital 5 days                       Physical Exam  Vitals and nursing note reviewed.   Constitutional:       General: He is not in acute distress.     Appearance: Normal appearance. He is not ill-appearing.   HENT:      Head: Normocephalic and atraumatic.   Cardiovascular:       Rate and Rhythm: Normal rate and regular rhythm.   Pulmonary:      Effort: Pulmonary effort is normal. No respiratory distress.      Breath sounds: Normal breath sounds. No wheezing.      Comments: Bedside US showed enlarged pleural effusion, no respiratory symptoms at this time.   Chest:      Comments: Drain removed.  Skin:     General: Skin is warm and dry.   Neurological:      General: No focal deficit present.      Mental Status: He is oriented to person, place, and time.            Significant Labs: All pertinent lab results from the last 24 hours have been reviewed.

## 2025-04-20 NOTE — PLAN OF CARE
Patient alert and oriented x4. Patient free from fall and injury. Fall precautions remained in place. VSS. Patient remained on room air. Sats 96-98%. NSR-ST on telemetry.HR 80-100s. Plan of care reviewed with the patient. Patient denies pain, chest pain, headaches, or SOB. No acute distress noted. Plan of care continues.     Problem: Adult Inpatient Plan of Care  Goal: Plan of Care Review  Outcome: Progressing  Goal: Patient-Specific Goal (Individualized)  Outcome: Progressing  Goal: Absence of Hospital-Acquired Illness or Injury  Outcome: Progressing  Goal: Optimal Comfort and Wellbeing  Outcome: Progressing  Goal: Readiness for Transition of Care  Outcome: Progressing

## 2025-04-20 NOTE — NURSING
AAOX4,VSS,O2 sats>92% on RA. CRP elevated today. CXRAY done. Pt started on prednisone. Plan of care discussed with patient. Patient has no complaints of pain/SOB. Discussed medications and care. Patient has no questions at this time. Pt visualised and stable. Call light within reach. Pt resting,bed at lowest position. Pt's family by the bedside. Will continue to monitor through the shift.

## 2025-04-20 NOTE — ASSESSMENT & PLAN NOTE
Patient found to have new pleural effusion and/or consolidation change in LLL noted on bedside echo and CXR done after removal of pericardial drain. Pt was preparing to discharge when O2 saturations dropped. Could be related to inflammatory process from pericarditis.   CT chest done - Occlusion of the left inferior lobar bronchus, with essentially complete collapse of the basal segments of the left lung. Left pleural effusion present, mucous plugging of several right lower lobe bronchioles and atelectasis.   Pulm consulted - s/p bronch  Rheum consult for eval of possible autoimmune process - numerous labs pending  Pulm recs  Echo with bubble study - no shunt  Unasyn (given 2 days IV) transitioned to PO abx  F/u cultures from bronch  Mox-Clav D5 (including IV Unasyn), Azithro D 3/5

## 2025-04-20 NOTE — PROGRESS NOTES
[facility]  Cardiology  Progress Note    Patient Name: Shekhar Snyder  MRN: [id number]  Admission Date: 4/11/2025  Hospital Length of Stay: 9 days  Code Status: Full Code   Attending Physician: Oliver Moctezuma MD   Primary Care Physician: Fracisco Phipps MD  Expected Discharge Date: 4/19/2025  Principal Problem:Pericardial effusion    Subjective:     Hospital Course:   Admitted for pericarditis and pericardial effusion with tamponade physiology. 1.6L of pericardial fluid were drained by interventional cardiology. Started on ibuprofen and colchicine. Drainage improved with eventual removal of drain. Some pleural effusions were noted, however, O2 saturations remained stable. CXR ordered prior to discharge revealed improvement in pericardial effusion and new pleural reaction atelectasis and/or consolidation change in left lower lobe.     Patient was medically cleared for discharge, however, prior to discharge patient became hypoxic, CXR was done which showed pleural effusion/atelectasis and was not discharged. Pulmonology was consulted, deferred thoracentesis, recommended CT chest and initiation of antibiotics for possible aspiration in the setting of recent pericardiocentesis. Rheumatology was also consulted for evaluation of possible autoimmune processes. Pulmonology later recommended bronchoscopy, done 4/17, which showed erythematous airways and significant secretions were which were sent for cultures.     CRP were trended throughout stay with fluctuations prompting considerations for initiation of prednisone, also discussed with outpatient cardiologist. Decision was made to continue colchicine and ibuprofen. Bedside ultrasound was done to evaluate pericardial effusion which showed increase in pleural effusion, planned to continue watching.    Interval History: Pt endorsed chest pain better. CRP increasing 149, 114, 173. Tmax 100.2 twice in last 24 hours.  Xray Chest showed left pleural  effusion, Denies SOB.    Review of Systems   Constitutional: Negative for chills and fever.   HENT:  Negative for congestion.    Eyes:  Negative for blurred vision and visual disturbance.   Cardiovascular:  Positive for chest pain. Negative for dyspnea on exertion, irregular heartbeat, leg swelling, near-syncope, orthopnea, palpitations, paroxysmal nocturnal dyspnea and syncope.   Respiratory:  Negative for cough, shortness of breath, sputum production and wheezing.    Skin:  Negative for color change.   Musculoskeletal:  Negative for arthritis and muscle weakness.   Gastrointestinal:  Negative for bloating, abdominal pain, change in bowel habit, nausea and vomiting.   Genitourinary:  Negative for dysuria.   Neurological:  Negative for dizziness and light-headedness.   Psychiatric/Behavioral:  The patient is not nervous/anxious.      Objective:     Vital Signs (Most Recent):  Temp: 100.2 °F (37.9 °C) (04/20/25 0724)  Pulse: (!) 118 (04/20/25 0726)  Resp: 19 (04/20/25 0724)  BP: 109/60 (04/20/25 0724)  SpO2: 95 % (04/20/25 0724) Vital Signs (24h Range):  Temp:  [98.1 °F (36.7 °C)-100.2 °F (37.9 °C)] 100.2 °F (37.9 °C)  Pulse:  [] 118  Resp:  [16-19] 19  SpO2:  [94 %-98 %] 95 %  BP: (102-118)/(58-77) 109/60     Weight: 76 kg (167 lb 8.8 oz)  Body mass index is 20.94 kg/m².     SpO2: 95 %       No intake or output data in the 24 hours ending 04/20/25 0731    Lines/Drains/Airways       Peripheral Intravenous Line  Duration                  Peripheral IV - Single Lumen 04/14/25 1441 20 G Left Antecubital 5 days                       Physical Exam  Vitals and nursing note reviewed.   Constitutional:       General: He is not in acute distress.     Appearance: Normal appearance. He is not ill-appearing.   HENT:      Head: Normocephalic and atraumatic.   Cardiovascular:      Rate and Rhythm: Normal rate and regular rhythm.   Pulmonary:      Effort: Pulmonary effort is normal. No respiratory distress.      Breath  sounds: Normal breath sounds. No wheezing.      Comments: Bedside US showed enlarged pleural effusion, no respiratory symptoms at this time.   Chest:      Comments: Drain removed.  Skin:     General: Skin is warm and dry.   Neurological:      General: No focal deficit present.      Mental Status: He is oriented to person, place, and time.            Significant Labs: All pertinent lab results from the last 24 hours have been reviewed.      Assessment and Plan:       * Pericardial effusion  Symptomatic with pain and SOB, elevations in CRP. EKG unremarkable. Hemodynamics stable, however, echo showed massive effusion, CVP 15, RV diastolic collapse and early cardiac tamponade, consequently, underwent STAT pericardiocentesis and 1.6 L fluid removed with drain left in place.   F/u studies  Continue colchicine   DC ibuprofen  Start Prednisone 40 mg daily and will taper         Cardiac tamponade  Bedside Echo shows no Tamponade but positive for plural effusion    Pleural effusion  Patient found to have new pleural effusion and/or consolidation change in LLL noted on bedside echo and CXR done after removal of pericardial drain. Pt was preparing to discharge when O2 saturations dropped. Could be related to inflammatory process from pericarditis.   CT chest done - Occlusion of the left inferior lobar bronchus, with essentially complete collapse of the basal segments of the left lung. Left pleural effusion present, mucous plugging of several right lower lobe bronchioles and atelectasis.   Pulm consulted - s/p bronch  Rheum consult for eval of possible autoimmune process - numerous labs pending  Pulm recs  Echo with bubble study - no shunt  Unasyn (given 2 days IV) transitioned to PO abx  F/u cultures from bronch  Mox-Clav D5 (including IV Unasyn), Azithro D 3/5      Pericarditis  Sent to hospital from outpatient cardiology follow up for initial episode of pericarditis from March. Etiology unknown, thought to be 2/2 to influenza  infection from February. Incomplete adherence to ibuprofen and colchicine 2/2 GI side effects. Unclear if current presentation is recurrent pericarditis vs incompletely treated primary episode.    Given increasing inflammatory markers, can consider using steroids. Discussed with outpatient cardiologist regarding initiation of steroids vs IL1 inhibitor - at the moment will hold off on escalating to steroids as may increase risk for recurrent pericarditis.   Continue colchicine   DC ibuprofen  Start Prednisone 40 mg daily and will taper        VTE Risk Mitigation (From admission, onward)           Ordered     IP VTE HIGH RISK PATIENT  Once         04/11/25 2151     Place sequential compression device  Until discontinued         04/11/25 2151                    Wood Ponce MD  Cardiology  [facility]

## 2025-04-20 NOTE — ASSESSMENT & PLAN NOTE
Symptomatic with pain and SOB, elevations in CRP. EKG unremarkable. Hemodynamics stable, however, echo showed massive effusion, CVP 15, RV diastolic collapse and early cardiac tamponade, consequently, underwent STAT pericardiocentesis and 1.6 L fluid removed with drain left in place.   F/u studies  Continue colchicine   DC ibuprofen  Start Prednisone 40 mg daily and will taper

## 2025-04-20 NOTE — ASSESSMENT & PLAN NOTE
Sent to hospital from outpatient cardiology follow up for initial episode of pericarditis from March. Etiology unknown, thought to be 2/2 to influenza infection from February. Incomplete adherence to ibuprofen and colchicine 2/2 GI side effects. Unclear if current presentation is recurrent pericarditis vs incompletely treated primary episode.    Given increasing inflammatory markers, can consider using steroids. Discussed with outpatient cardiologist regarding initiation of steroids vs IL1 inhibitor - at the moment will hold off on escalating to steroids as may increase risk for recurrent pericarditis.   Continue colchicine   DC ibuprofen  Start Prednisone 40 mg daily and will taper

## 2025-04-21 VITALS
DIASTOLIC BLOOD PRESSURE: 56 MMHG | RESPIRATION RATE: 15 BRPM | SYSTOLIC BLOOD PRESSURE: 107 MMHG | WEIGHT: 167.56 LBS | HEIGHT: 75 IN | OXYGEN SATURATION: 98 % | TEMPERATURE: 98 F | HEART RATE: 89 BPM | BODY MASS INDEX: 20.83 KG/M2

## 2025-04-21 LAB
ABSOLUTE EOSINOPHIL (OHS): 0.14 K/UL
ABSOLUTE MONOCYTE (OHS): 1.52 K/UL (ref 0.3–1)
ABSOLUTE NEUTROPHIL COUNT (OHS): 10.06 K/UL (ref 1.8–7.7)
ALBUMIN SERPL BCP-MCNC: 2.8 G/DL (ref 3.5–5.2)
ALP SERPL-CCNC: 64 UNIT/L (ref 40–150)
ALT SERPL W/O P-5'-P-CCNC: 32 UNIT/L (ref 10–44)
ANION GAP (OHS): 9 MMOL/L (ref 8–16)
AST SERPL-CCNC: 24 UNIT/L (ref 11–45)
BACTERIA SPEC ANAEROBE CULT: NORMAL
BASOPHILS # BLD AUTO: 0.03 K/UL
BASOPHILS NFR BLD AUTO: 0.2 %
BILIRUB SERPL-MCNC: 0.3 MG/DL (ref 0.1–1)
BUN SERPL-MCNC: 12 MG/DL (ref 6–20)
CALCIUM SERPL-MCNC: 8.9 MG/DL (ref 8.7–10.5)
CHLORIDE SERPL-SCNC: 102 MMOL/L (ref 95–110)
CO2 SERPL-SCNC: 24 MMOL/L (ref 23–29)
CREAT SERPL-MCNC: 0.7 MG/DL (ref 0.5–1.4)
CRP SERPL-MCNC: 172.15 MG/L
ERYTHROCYTE [DISTWIDTH] IN BLOOD BY AUTOMATED COUNT: 13 % (ref 11.5–14.5)
GFR SERPLBLD CREATININE-BSD FMLA CKD-EPI: >60 ML/MIN/1.73/M2
GLUCOSE SERPL-MCNC: 107 MG/DL (ref 70–110)
HCT VFR BLD AUTO: 35.4 % (ref 40–54)
HGB BLD-MCNC: 11.6 GM/DL (ref 14–18)
IMM GRANULOCYTES # BLD AUTO: 0.08 K/UL (ref 0–0.04)
IMM GRANULOCYTES NFR BLD AUTO: 0.5 % (ref 0–0.5)
LYMPHOCYTES # BLD AUTO: 3.08 K/UL (ref 1–4.8)
MAGNESIUM SERPL-MCNC: 2.1 MG/DL (ref 1.6–2.6)
MCH RBC QN AUTO: 28.1 PG (ref 27–31)
MCHC RBC AUTO-ENTMCNC: 32.8 G/DL (ref 32–36)
MCV RBC AUTO: 86 FL (ref 82–98)
NUCLEATED RBC (/100WBC) (OHS): 0 /100 WBC
PLATELET # BLD AUTO: 437 K/UL (ref 150–450)
PMV BLD AUTO: 10.8 FL (ref 9.2–12.9)
POTASSIUM SERPL-SCNC: 3.3 MMOL/L (ref 3.5–5.1)
PROT SERPL-MCNC: 6.9 GM/DL (ref 6–8.4)
RBC # BLD AUTO: 4.13 M/UL (ref 4.6–6.2)
RELATIVE EOSINOPHIL (OHS): 0.9 %
RELATIVE LYMPHOCYTE (OHS): 20.7 % (ref 18–48)
RELATIVE MONOCYTE (OHS): 10.2 % (ref 4–15)
RELATIVE NEUTROPHIL (OHS): 67.5 % (ref 38–73)
SODIUM SERPL-SCNC: 135 MMOL/L (ref 136–145)
WBC # BLD AUTO: 14.91 K/UL (ref 3.9–12.7)

## 2025-04-21 PROCEDURE — 25000003 PHARM REV CODE 250

## 2025-04-21 PROCEDURE — 83735 ASSAY OF MAGNESIUM: CPT | Performed by: STUDENT IN AN ORGANIZED HEALTH CARE EDUCATION/TRAINING PROGRAM

## 2025-04-21 PROCEDURE — 25000003 PHARM REV CODE 250: Performed by: STUDENT IN AN ORGANIZED HEALTH CARE EDUCATION/TRAINING PROGRAM

## 2025-04-21 PROCEDURE — 99239 HOSP IP/OBS DSCHRG MGMT >30: CPT | Mod: ,,, | Performed by: INTERNAL MEDICINE

## 2025-04-21 PROCEDURE — 63600175 PHARM REV CODE 636 W HCPCS: Performed by: STUDENT IN AN ORGANIZED HEALTH CARE EDUCATION/TRAINING PROGRAM

## 2025-04-21 PROCEDURE — 36415 COLL VENOUS BLD VENIPUNCTURE: CPT | Performed by: STUDENT IN AN ORGANIZED HEALTH CARE EDUCATION/TRAINING PROGRAM

## 2025-04-21 PROCEDURE — 86480 TB TEST CELL IMMUN MEASURE: CPT | Performed by: STUDENT IN AN ORGANIZED HEALTH CARE EDUCATION/TRAINING PROGRAM

## 2025-04-21 PROCEDURE — 80053 COMPREHEN METABOLIC PANEL: CPT | Performed by: STUDENT IN AN ORGANIZED HEALTH CARE EDUCATION/TRAINING PROGRAM

## 2025-04-21 PROCEDURE — 94640 AIRWAY INHALATION TREATMENT: CPT

## 2025-04-21 PROCEDURE — 63700000 PHARM REV CODE 250 ALT 637 W/O HCPCS

## 2025-04-21 PROCEDURE — 85025 COMPLETE CBC W/AUTO DIFF WBC: CPT | Performed by: STUDENT IN AN ORGANIZED HEALTH CARE EDUCATION/TRAINING PROGRAM

## 2025-04-21 PROCEDURE — 86141 C-REACTIVE PROTEIN HS: CPT | Performed by: STUDENT IN AN ORGANIZED HEALTH CARE EDUCATION/TRAINING PROGRAM

## 2025-04-21 RX ORDER — AZITHROMYCIN 250 MG/1
250 TABLET, FILM COATED ORAL DAILY
Qty: 1 TABLET | Refills: 0 | Status: SHIPPED | OUTPATIENT
Start: 2025-04-22

## 2025-04-21 RX ORDER — POTASSIUM CHLORIDE 20 MEQ/1
60 TABLET, EXTENDED RELEASE ORAL ONCE
Status: COMPLETED | OUTPATIENT
Start: 2025-04-21 | End: 2025-04-21

## 2025-04-21 RX ORDER — PREDNISONE 20 MG/1
40 TABLET ORAL DAILY
Qty: 60 TABLET | Refills: 0 | Status: SHIPPED | OUTPATIENT
Start: 2025-04-22

## 2025-04-21 RX ORDER — POTASSIUM CHLORIDE 20 MEQ/1
60 TABLET, EXTENDED RELEASE ORAL ONCE
Status: DISCONTINUED | OUTPATIENT
Start: 2025-04-21 | End: 2025-04-21

## 2025-04-21 RX ADMIN — AZITHROMYCIN DIHYDRATE 250 MG: 250 TABLET ORAL at 08:04

## 2025-04-21 RX ADMIN — PREDNISONE 40 MG: 20 TABLET ORAL at 08:04

## 2025-04-21 RX ADMIN — AMOXICILLIN AND CLAVULANATE POTASSIUM 1 TABLET: 875; 125 TABLET, FILM COATED ORAL at 08:04

## 2025-04-21 RX ADMIN — COLCHICINE 0.6 MG: 0.6 TABLET, FILM COATED ORAL at 08:04

## 2025-04-21 RX ADMIN — POTASSIUM CHLORIDE 60 MEQ: 1500 TABLET, EXTENDED RELEASE ORAL at 08:04

## 2025-04-21 RX ADMIN — FLUTICASONE FUROATE AND VILANTEROL TRIFENATATE 1 PUFF: 200; 25 POWDER RESPIRATORY (INHALATION) at 09:04

## 2025-04-21 RX ADMIN — PANTOPRAZOLE SODIUM 40 MG: 40 TABLET, DELAYED RELEASE ORAL at 08:04

## 2025-04-21 NOTE — PLAN OF CARE
Future Appointments   Date Time Provider Department Center   4/23/2025  2:40 PM Jonathon Kelly MD [facility] [place]   5/6/2025  3:15 PM CV [facility] [place]   7/9/2025  8:30 AM Ricarda Callahan MD [facility]         Gen Cards and PCP scheduled. Information added to AVS.        Sophie Lynne, PATTIW, RSW, BSW  Case Management  [id number]

## 2025-04-21 NOTE — PLAN OF CARE
[facility]  Discharge Final Note    Primary Care Provider: Fracisco Phipps MD    Expected Discharge Date: 4/21/2025    Final Discharge Note (most recent)       Final Note - 04/21/25 1151          Final Note    Assessment Type Final Discharge Note     Anticipated Discharge Disposition Home or Self Care                 Cande Ferro LMSW  [facility]  [id number]      Future Appointments   Date Time Provider Department Center   4/23/2025  2:40 PM Jonathon Kelly MD [facility] [place]   5/6/2025  3:15 PM CV [facility] [place]   7/9/2025  8:30 AM Ricarda Callahan MD [facility] [place] [facility]       Contact Info       Jonathon Kelly MD   Specialty: Interventional Cardiology, Cardiology    [address]  [address]  [place] [address]   Phone: [phone]       Next Steps: Schedule an appointment as soon as possible for a visit in 1 week(s)    Instructions: Hospital Follow up    Fracisco Phipps MD   Specialty: Internal Medicine   Relationship: PCP - General    [facility]  [address]  [place] [address]   Phone: [phone]       Next Steps: Follow up on 4/28/2025    Instructions: Hospital follow up established patient @ 1:30 pm. Please bring discharge summary, ID, insurance card, and medication list.

## 2025-04-21 NOTE — NURSING
"1944  Assessment completed, see flowsheets. Denies pain, nausea, and/or SOB. Decreased appetite r/t disliking hospital food-outside food/food from home encouraged. States had diarrhea yesterday but has since resolved. Frustrated r/t fever returning (100.7 at this time). Discussed POC for this shift. Verbalizes understanding with no questions or concerns voiced. Call light within reach, will continue to monitor.     2301  VSS. States he hasn't been able "to sleep more than an hour or two the whole time I've been here".  Denies need for sleep aide, disruption to sleep is r/t frequent VS and labs. Sign posted to not disturb pt until after 0500. Will continue to monitor.     0541  returned to sleep after morning labs and vitals. Continues to sleep quietly with no sign of pain or discomfort noted. Will continue to monitor and report to oncoming shift.     *T-max this shift 100.7  "

## 2025-04-21 NOTE — PROGRESS NOTES
"[facility]  Adult Nutrition  Progress Note    SUMMARY       Recommendations    Recommendation/Intervention:   1. Continue regular diet as tolerated  - please continue to document PO % intake via flowsheets     2. Encourage good intake    3. RD to monitor weight, labs, intake, tolerance    Goals:   1. % nutritional needs met with diet during admission    2. Maintain weight during admission  Nutrition Goal Status: new  Communication of RD Recs: other (comment) (POC)    Nutrition Discharge Planning    Nutrition Discharge Planning: General healthy diet    Assessment and Plan    No nutrition diagnosis at this time.     Malnutrition Assessment    Pending NFPE    Reason for Assessment    Reason For Assessment: length of stay  Diagnosis: other (see comments) (pericardial effusion)  General Information Comments: Pt admitted with pericardial effusion. PMHx: ADHD, PTSD. Recent surgical hx: Pericardiocentesis (4/11) and Bronchoscopy (4/17). No edema noted. No wounds noted. No GI s/s - BM: 4/17. Pt eating food from outside of hospital. Pt did not eat breakfast this morning due to no appetite. "Decreased appetite r/t disliking hospital food-outside food/food from home encouraged." Generally having a good intake - PO: %. Wt trending up.    Nutrition/Diet History    Spiritual, Cultural Beliefs, Congregational Practices, Values that Affect Care: no  Food Allergies: NKFA  Factors Affecting Nutritional Intake: decreased appetite (dislikes hospital food)    Nutrition Related Social Determinants of Health: SDOH: None Identified    Food Insecurity: No Food Insecurity (4/13/2025)    Hunger Vital Sign     Worried About Running Out of Food in the Last Year: Never true     Ran Out of Food in the Last Year: Never true     Anthropometrics    Height: 6' 3" (190.5 cm)  Height (inches): 75 in  Height Method: Stated  Weight: 76 kg (167 lb 8.8 oz)  Weight (lb): 167.55 lb  Weight Method: Standard Scale  Ideal Body Weight " (IBW), Male: 196 lb  % Ideal Body Weight, Male (lb): 86.27 %  BMI (Calculated): 20.9  BMI Grade: 18.5-24.9 - normal    Lab/Procedures/Meds    Pertinent Labs Reviewed: reviewed  Pertinent Labs Comments: H/H 11.6/35.4 low, iron 36 low, Na 135 low, K 3.3 low, albumin 2.8 low, .0 high  Pertinent Medications Reviewed: reviewed  Pertinent Medications Comments: amoxicillin, azithromycin, pantoprazole, potassium chloride, prednisone    Estimated/Assessed Needs    Weight Used For Calorie Calculations: 76 kg (167 lb 8.8 oz)  Energy Calorie Requirements (kcal): 2393 kcal  Energy Need Method: Fergus-St Jeor (* 1.3)  Protein Requirements: 61-76 g/pro (0.8-1.0 g/kg)  Weight Used For Protein Calculations: 76 kg (167 lb 8.8 oz)        RDA Method (mL): 2393  CHO Requirement: 299 g    Nutrition Prescription Ordered    Current Diet Order: regular    Evaluation of Received Nutrient/Fluid Intake    Energy Calories Required: meeting needs  Protein Required: meeting needs  Fluid Required: other (see comments) (per MD)  Tolerance: tolerating  % Intake of Estimated Energy Needs: 75 - 100 %  % Meal Intake: 75 - 100 %    Nutrition Risk    Level of Risk/Frequency of Follow-up: low     Monitor and Evaluation    Monitor and Evaluation: Energy intake, Food and beverage intake, Protein intake, Carbohydrate intake, Diet order, Weight, Electrolyte and renal panel, Gastrointestinal profile, Glucose/endocrine profile, Inflammatory profile, Lipid profile, Nutrition focused physical findings     Nutrition Follow-Up    RD Follow-up?: Yes

## 2025-04-21 NOTE — PLAN OF CARE
Problem: Adult Inpatient Plan of Care  Goal: Plan of Care Review  Outcome: Progressing  Flowsheets (Taken 4/20/2025 1944)  Plan of Care Reviewed With:   patient   significant other  Goal: Optimal Comfort and Wellbeing  Outcome: Progressing  Intervention: Monitor Pain and Promote Comfort  Flowsheets (Taken 4/20/2025 1944)  Pain Management Interventions:   care clustered   pillow support provided   quiet environment facilitated   relaxation techniques promoted   pain management plan reviewed with patient/caregiver     Problem: Pain Acute  Goal: Optimal Pain Control and Function  Outcome: Progressing  Intervention: Prevent or Manage Pain  Flowsheets (Taken 4/20/2025 1944)  Bowel Elimination Promotion:   adequate fluid intake promoted   ambulation promoted

## 2025-04-21 NOTE — PLAN OF CARE
Recommendations     Recommendation/Intervention:   1. Continue regular diet as tolerated  - please continue to document PO % intake via flowsheets     2. Encourage good intake    3. RD to monitor weight, labs, intake, tolerance     Goals:   1. % nutritional needs met with diet during admission    2. Maintain weight during admission  Nutrition Goal Status: new  Communication of RD Recs: other (comment) (POC)     Nutrition Discharge Planning     Nutrition Discharge Planning: General healthy diet      abdomen

## 2025-04-21 NOTE — DISCHARGE SUMMARY
[facility]  Cardiology  Discharge Summary      Patient Name: Shekhar Snyder  MRN: [id number]  Admission Date: 4/11/2025  Hospital Length of Stay: 10 days  Discharge Date and Time: 04/21/2025 1:17 PM  Attending Physician: Iesha att. providers found    Discharging Provider: Maddy Bullock MD  Primary Care Physician: Fracisco Phipps MD    HPI:   Mr Snyder comes to the ED from outpatient cardiology office with Dr Kelly for massive pericardial effusion.     24-year-old male with PMH for MDD, ADHD and PTSD who initially developed symptoms 4-5 days prior to hospital admission on 03/10/2025, reporting chest pain when lying down and dyspnea. Symptoms persisted and worsened over four days, prompting him to seek medical attention.     In the hospital, he was diagnosed with a pericardial effusion/acute pericarditis and treated with high-dose ibuprofen (800 mg q8h). He was discharged on colchicine 0.6 mg daily and instructed to taper ibuprofen over a week or as pain lessened. He reports that pain never fully subsided, and he continued taking ibuprofen intermittently.  GI symptoms were limiting his ibuprofen use.     5 days ago, he had a significant flare-up of symptoms. Pain was significant enough that he left work on Monday night. He restarted high-dose ibuprofen, taking up to 5 tablets (1000 mg) q8h, 2-3 times daily. He has been out of work since Monday due to pain.     He reports that today has been one of his best days symptom-wise, which he attributes to resting and avoiding physical exertion. He still has discomfort when lying flat on his back, a symptom that has persisted since March. He also notes pain that radiates from his heart to his shoulder, particularly on symptomatic days, as well as pain in the neck area and discomfort when lying on it.     He had influenza A 1 month before his March hospitalization, which kept him sick for a whole week. This is suspected to be the trigger for his  pericarditis.     He has a family history of recurrent pericarditis, with his mother also affected by the condition.     He denies any previous diagnoses of pericarditis prior to 03/2025 or a history of other relevant medical conditions or inflammatory diagnoses.    Procedure(s) (LRB):  Bronchoscopy (N/A)     Indwelling Lines/Drains at time of discharge:  Lines/Drains/Airways       None                   Hospital Course:  Admitted for pericarditis and pericardial effusion with tamponade physiology. 1.6L of pericardial fluid were drained by interventional cardiology. Started on ibuprofen and colchicine. Drainage improved with eventual removal of drain. Some pleural effusions were noted, however, O2 saturations remained stable. CXR ordered prior to discharge revealed improvement in pericardial effusion and new pleural reaction atelectasis and/or consolidation change in left lower lobe.     Patient was medically cleared for discharge, however, prior to discharge patient became hypoxic, CXR was done which showed pleural effusion/atelectasis and was not discharged. Pulmonology was consulted, deferred thoracentesis, recommended CT chest and initiation of antibiotics for possible aspiration in the setting of recent pericardiocentesis. Rheumatology was also consulted for evaluation of possible autoimmune processes. Pulmonology later recommended bronchoscopy, done 4/17, which showed erythematous airways and significant secretions were which were sent for cultures.     CRP were trended throughout stay with fluctuations prompting considerations for initiation of prednisone, also discussed with outpatient cardiologist. Decision was made to discontinue ibuprofen. Start prednisone and continue colchicine. Bedside ultrasound was done to evaluate pericardial effusion which showed increase in pleural effusion, planned to continue watching. Patient reported improvement in chest pain. Overall medically stable for discharge on  prednisone and colchicine. Recommend close follow-up with cardiology.    Goals of Care Treatment Preferences:  Code Status: Full Code    Vitals:    04/21/25 1102   BP:    Pulse: 89   Resp:    Temp:        Physical Exam  Vitals and nursing note reviewed.   Constitutional:       General: He is not in acute distress.     Appearance: Normal appearance. He is not ill-appearing.   HENT:      Head: Normocephalic and atraumatic.   Cardiovascular:      Rate and Rhythm: Normal rate and regular rhythm.   Pulmonary:      Effort: Pulmonary effort is normal. No respiratory distress.      Breath sounds: Normal breath sounds. No wheezing.      Comments: Bedside US showed enlarged pleural effusion, no respiratory symptoms at this time.   Chest:      Comments: Drain removed.  Skin:     General: Skin is warm and dry.   Neurological:      General: No focal deficit present.      Mental Status: He is oriented to person, place, and time.     Consults:   Consults (From admission, onward)          Status Ordering Provider     Inpatient consult to Rheumatology  Once        Provider:  (Not yet assigned)    Completed NESSA COOMBS     Inpatient consult to Pulmonology  Once        Provider:  (Not yet assigned)    Completed KOLE REDDING     Inpatient consult to Cardiology  Once        Provider:  (Not yet assigned)    Completed PRANAV ORTEGA            Significant Diagnostic Studies: N/A    Pending Diagnostic Studies:       Procedure Component Value Units Date/Time    [facility] Miscellaneous Test 1 [[id number]] Collected: 04/11/25 2139    Order Status: Sent Lab Status: In process Updated: 04/14/25 0917    Specimen: Body Fluid     Adenosine Deaminase, Pericardial Fluid [[id number]] Collected: 04/11/25 2045    Order Status: Sent Lab Status: In process Updated: 04/12/25 0112    Specimen: Body Fluid     Anti-Neutrophilic Cytoplasmic Antibody [[id number]] Collected: 04/16/25 0518    Order Status: Sent Lab Status: In process Updated: 04/17/25  1106    Specimen: Blood     Cytomegalovirus DNA probe, amplified [[id number]] Collected: 04/11/25 2045    Order Status: Sent Lab Status: In process Updated: 04/12/25 0215    Specimen: Body Fluid from Pericardial Fluid     EXTRA TUBES [[id number]]     Order Status: Sent Lab Status: No result     Specimen: Blood, Venous     Narrative:      The following orders were created for panel order EXTRA TUBES.  Procedure                               Abnormality         Status                     ---------                               -----------         ------                     Gold Top Hold[[id number]]                                                                Please view results for these tests on the individual orders.    Gold Top Hold [[id number]]     Order Status: Sent Lab Status: No result     Specimen: Blood, Venous     MyoMarker Panel 3 [[id number]] Collected: 04/16/25 1030    Order Status: Sent Lab Status: In process Updated: 04/16/25 1037    Specimen: Blood     Quantiferon Gold TB [[id number]] Collected: 04/21/25 1056    Order Status: Sent Lab Status: In process Updated: 04/21/25 1107    Specimen: Blood     Quantiferon Gold TB [[id number]] Collected: 04/21/25 1056    Order Status: Sent Lab Status: In process Updated: 04/21/25 1104    Specimen: Blood     Narrative:      The following orders were created for panel order Quantiferon Gold TB.  Procedure                               Abnormality         Status                     ---------                               -----------         ------                     Quantiferon Gold TB[[id number]]                             In process                 Quantiferon Gold TB - NIL[[id number]]                       In process                 Quantiferon Gold TB - T...[[id number]]                      In process                 Quantiferon Gold TB - T...[[id number]]                      In process                   Please view results for these tests on the individual  orders.    Quantiferon Gold TB - NIL [[id number]] Collected: 04/21/25 1056    Order Status: Sent Lab Status: In process Updated: 04/21/25 1107    Specimen: Blood     Quantiferon Gold TB - TB1 Ag [[id number]] Collected: 04/21/25 1056    Order Status: Sent Lab Status: In process Updated: 04/21/25 1104    Specimen: Blood     Quantiferon Gold TB - TB2 Ag [[id number]] Collected: 04/21/25 1056    Order Status: Sent Lab Status: In process Updated: 04/21/25 1106    Specimen: Blood     RNA polymerase III Ab, IgG [[id number]] Collected: 04/16/25 0518    Order Status: Sent Lab Status: In process Updated: 04/16/25 0544    Specimen: Blood     Th/To Antibody [[id number]] Collected: 04/16/25 0518    Order Status: Sent Lab Status: In process Updated: 04/16/25 0540    Specimen: Blood             Final Active Diagnoses:    Diagnosis Date Noted POA    PRINCIPAL PROBLEM:  Pericardial effusion [I31.39] 03/11/2025 Yes    Cardiac tamponade [I31.4] 04/16/2025 Yes    Acute hypoxemic respiratory failure [J96.01] 04/16/2025 No    Atelectasis [J98.11] 04/16/2025 No    Pleural effusion [J90] 04/15/2025 No    Pericarditis [I31.9] 03/12/2025 Yes    Former smoker [Z87.891] 03/11/2025 Not Applicable    Attention deficit hyperactivity disorder (ADHD), predominantly inattentive type [F90.0] 06/30/2016 Yes      Problems Resolved During this Admission:     No new Assessment & Plan notes have been filed under this hospital service since the last note was generated.  Service: Cardiology      Discharged Condition: stable    Disposition: Home or Self Care    Follow Up:   Follow-up Information       Jonathon Kelly MD. Schedule an appointment as soon as possible for a visit in 1 week(s).    Specialties: Interventional Cardiology, Cardiology  Why: Hospital Follow up  Contact information:  [address]  [address]  [place] [address]  [phone]               Fracisco Phipps MD Follow up on 4/28/2025.    Specialty: Internal Medicine  Why:  Hospital follow up established patient @ 1:30 pm. Please bring discharge summary, ID, insurance card, and medication list.  Contact information:  [address]  [place]  [phone]                           Patient Instructions:      Ambulatory referral/consult to Rheumatology   Standing Status: Future   Referral Priority: Routine Referral Type: Consultation   Referral Reason: Specialty Services Required   Requested Specialty: Rheumatology   Number of Visits Requested: 1     Ambulatory referral/consult to Genetics   Standing Status: Future   Referral Priority: Routine Referral Type: Consultation   Referral Reason: Specialty Services Required   Number of Visits Requested: 1     Echo Saline Bubble? No   Standing Status: Future Standing Exp. Date: 04/14/26   Order Comments: F/u pericardial effusion     Order Specific Question Answer Comments   Saline Bubble? No    Release to patient Immediate      Medications:  Reconciled Home Medications:      Medication List        START taking these medications      azithromycin 250 MG tablet  Commonly known as: Z-GAYLE  Take 1 tablet (250 mg total) by mouth once daily.  Start taking on: April 22, 2025     predniSONE 20 MG tablet  Commonly known as: DELTASONE  Take 2 tablets (40 mg total) by mouth once daily. Taper outpatient per your physician  Start taking on: April 22, 2025            CONTINUE taking these medications      colchicine 0.6 mg tablet  Commonly known as: COLCRYS  Take 1 tablet (0.6 mg total) by mouth 2 (two) times daily.     pantoprazole 40 MG tablet  Commonly known as: PROTONIX  Take 1 tablet (40 mg total) by mouth once daily.     VENTOLIN HFA 90 mcg/actuation inhaler  Generic drug: albuterol  Inhale 2 puffs into the lungs every 6 (six) hours as needed for Wheezing. Rescue            STOP taking these medications      ibuprofen 800 MG tablet  Commonly known as: ADVILMOTRIN              Time spent on the discharge of patient: 30  minutes    Maddy Bullock MD  Cardiology  [facility]

## 2025-04-22 ENCOUNTER — PATIENT OUTREACH (OUTPATIENT)
Dept: ADMINISTRATIVE | Facility: CLINIC | Age: 25
End: 2025-04-22
Payer: COMMERCIAL

## 2025-04-22 LAB
MITOGEN MINUS NIL (OHS): 0.45
NIL TB SYNCED (OHS): 0
QUANTIFERON GOLD INTERP (OHS): ABNORMAL
TB1 AG MINUS NIL (OHS): 0
TB2 AG MINUS NIL (OHS): 0
W C-ANCA: NORMAL TITER
W P-ANCA: NORMAL TITER
W RNA POLYMERASE III: <20 UNITS

## 2025-04-22 RX ORDER — SULFAMETHOXAZOLE AND TRIMETHOPRIM 800; 160 MG/1; MG/1
1 TABLET ORAL DAILY
Qty: 30 TABLET | Refills: 0 | Status: SHIPPED | OUTPATIENT
Start: 2025-04-22 | End: 2025-04-23

## 2025-04-22 NOTE — PROGRESS NOTES
C3 nurse attempted to contact Shekhar Snyder  for a TCC post hospital discharge follow up call.  No answer. LVM. The patient has a scheduled HOSFU appointment with Fracisco Phipps MD  on 4/28/25 @ 0942.

## 2025-04-22 NOTE — PROGRESS NOTES
C3 nurse spoke with Shekhar Snyder  for a TCC post hospital discharge follow up call. The patient has a scheduled HOSFU appointment with Fracisco Phipps MD  on 4/28/25 @ 3781.

## 2025-04-23 ENCOUNTER — E-CONSULT (OUTPATIENT)
Dept: GENETICS | Facility: CLINIC | Age: 25
End: 2025-04-23
Payer: COMMERCIAL

## 2025-04-23 ENCOUNTER — OFFICE VISIT (OUTPATIENT)
Dept: CARDIOLOGY | Facility: CLINIC | Age: 25
End: 2025-04-23
Payer: COMMERCIAL

## 2025-04-23 VITALS — SYSTOLIC BLOOD PRESSURE: 119 MMHG | HEART RATE: 80 BPM | DIASTOLIC BLOOD PRESSURE: 75 MMHG

## 2025-04-23 DIAGNOSIS — I30.0 RECURRENT IDIOPATHIC PERICARDITIS: Primary | ICD-10-CM

## 2025-04-23 DIAGNOSIS — I31.39 PERICARDIAL EFFUSION: ICD-10-CM

## 2025-04-23 DIAGNOSIS — I31.39 PERICARDIAL EFFUSION: Primary | ICD-10-CM

## 2025-04-23 DIAGNOSIS — J90 PLEURAL EFFUSION: ICD-10-CM

## 2025-04-23 PROBLEM — I31.4 CARDIAC TAMPONADE: Status: RESOLVED | Noted: 2025-04-16 | Resolved: 2025-04-23

## 2025-04-23 PROCEDURE — 99999 PR PBB SHADOW E&M-EST. PATIENT-LVL III: CPT | Mod: PBBFAC,,, | Performed by: INTERNAL MEDICINE

## 2025-04-23 PROCEDURE — 1160F RVW MEDS BY RX/DR IN RCRD: CPT | Mod: CPTII,S$GLB,, | Performed by: INTERNAL MEDICINE

## 2025-04-23 PROCEDURE — 1159F MED LIST DOCD IN RCRD: CPT | Mod: CPTII,S$GLB,, | Performed by: INTERNAL MEDICINE

## 2025-04-23 PROCEDURE — 1111F DSCHRG MED/CURRENT MED MERGE: CPT | Mod: CPTII,S$GLB,, | Performed by: INTERNAL MEDICINE

## 2025-04-23 PROCEDURE — 3078F DIAST BP <80 MM HG: CPT | Mod: CPTII,S$GLB,, | Performed by: INTERNAL MEDICINE

## 2025-04-23 PROCEDURE — 3074F SYST BP LT 130 MM HG: CPT | Mod: CPTII,S$GLB,, | Performed by: INTERNAL MEDICINE

## 2025-04-23 PROCEDURE — 99215 OFFICE O/P EST HI 40 MIN: CPT | Mod: S$GLB,,, | Performed by: INTERNAL MEDICINE

## 2025-04-23 RX ORDER — SULFAMETHOXAZOLE AND TRIMETHOPRIM 800; 160 MG/1; MG/1
1 TABLET ORAL
Qty: 30 TABLET | Refills: 0 | Status: SHIPPED | OUTPATIENT
Start: 2025-04-23

## 2025-04-23 NOTE — CONSULTS
[facility]  Response for E-Consult     Patient Name: Shekhar Snyder  MRN: [id number]  Primary Care Provider: Fracisco Phipps MD   Requesting Provider: Navya Romeo MD  E-Consult to Genetics  Consult performed by: Peggy Austin MD  Consult ordered by: Navya Romeo MD          After evaluation of your eConsult clinical questions, I believe the patient should be scheduled for an office visit in our specialty due to the need for medical genetics evaluation of medical and family histories and physical examination to determine the appropriate testing needed. Of note, testing for this particular group of genetic conditions is often of low yield, but can be diagnosed clinically in some cases.    Total time of Consultation: 5 minute    *This eConsult is based on the clinical data available to me and is furnished without benefit of a physician examination.  The eConsult will need to be interpreted in light of any clinical issues of changes in patient status not available to me at the time rime of filing this eConsults.  Significant changes in patient condition of level of acuity should result in a referral for in person consultation and reevaluation.  Please alert me if you have any furth questions.     Thank you for this eConsult referral.     MD [facility]

## 2025-04-25 LAB
CMV DNA SPEC QL NAA+PROBE: NEGATIVE
SPECIMEN SOURCE: NORMAL

## 2025-04-28 NOTE — PROGRESS NOTES
Patient ID: Shekhar Snyder is a 24 y.o. male.    History of Present Illness      CHIEF COMPLAINT:  - Presents for hospital follow-up of recurrent pericarditis with recent complications including pericardial effusion, pleural effusion, and pulmonary infection.    HPI:  Patient, a 24-year-old male, was initially diagnosed with pericarditis in 03/2025, complicated by a pericardial effusion that increased in size, requiring a follow-up admission and pericardiocentesis in 04/2025. He was recently discharged on colchicine and prednisone.    New findings include a pleural effusion and pulmonary infection, for which he underwent bronchoscopy and is currently on antibiotic treatment. His inflammatory markers have fluctuated since 03/2025, with a marked elevation in CRP near hospital discharge, leading to the addition of prednisone therapy.  Is pericardial effusion did not recur post tap.  He was evaluated by Rheumatology inpatient and has follow up in July.  Rheumatology markers nonspecific for a particular autoimmune disorder.    He reports significant improvement compared to 1 week ago, with enhanced ability to perform daily activities. He still has mild chest tightness, particularly upon waking, described as moderate and painful but manageable. He typically remains still for 5 minutes initiating activities, feeling improved within 30 minutes, though he may need to rest. He also reports significant dyspnea a recent grocery shopping trip.    He has been taking prednisone for a few days, initially at 3 PM but recently adjusting to noon, and reports mood swings as a side effect.    Extensive inpatient chart review.  Hospital course also discussed with Dr. Kimbrough..        CARDIAC HISTORY (IMAGING REVIEWED DURING VISIT):  - CT chest 04/15/2025: Minimal pericardial effusion, normal heart size, multiple prominent mediastinal nodes, occlusion of left inferior lobar bronchus with collapse of basal segments of left lung, moderate  left pleural effusion  - Echo 04/17/2025: normal LV size and function, EF 60-65%, chronic pericardial changes without effusion  - CXR 04/20/2025: enlarged heart  - ECG 04/19/2025: sinus tachycardia, no Q waves or ST changes    MEDICAL HISTORY:  - Pericarditis: 03/2025, complicated by pericardial effusion  - Pleural effusion: 04/2025  - Pulmonary infection: 04/2025    SURGICAL HISTORY:  - Pericardiocentesis: 04/2025, for treatment of pericardial effusion, 1500 cc's of serous fluid removed    MEDICATIONS:  - Colchicine 0.6 mg, twice daily  - Ibuprofen  - Azithromycin, one-time dose, for pulmonary infection  - Prednisone 40 mg, daily, for pericarditis  - Bactrim, 3x weekly    FAMILY HISTORY:  - Mother: history of pericarditis    SOCIAL HISTORY:  - Marital status: Has a girlfriend         Physical Exam    Vitals: Pulse: 80. Blood pressure: 119/75.  Cardiovascular: Regular rate and rhythm. Normal S1 and S2. No murmurs.  Lungs: Lungs clear to auscultation at bilateral bases.  Extremities: No lower extremity edema.  Neck: No JVD.  TEST RESULTS  (IMAGING REVIEWED DURING  CLINIC VISIT):  - Cardiac catheterization (04/11/2025): 1500 cc's of serous fluid removed  - Bronchoscopy (04/17/2025): Notable secretions         LABS:  Hemoglobin:  Recent Labs   Lab 04/19/25 0345 04/20/25  0407 04/21/25  0514   HGB 12.2 L 13.0 L 11.6 L     MCV:  Recent Labs   Lab 04/19/25 0345 04/20/25  0407 04/21/25  0514   MCV 87 88 86     Platelet:  Recent Labs   Lab 04/19/25 0345 04/20/25  0407 04/21/25  0514   Platelet Count 382 408 437     A1C:      Creatinine:  Recent Labs   Lab 04/19/25  0345 04/20/25  0407 04/21/25  0514   Creatinine 0.7 0.8 0.7     BUN:  Recent Labs   Lab 04/19/25 0345 04/20/25  0407 04/21/25  0514   BUN 12 13 12     GFR:   @LAB3YR(EGFRNORACEVR:3)  Albumin:  Recent Labs   Lab Result Units 04/19/25 0345 04/20/25  0407 04/21/25  0514   Albumin g/dL 2.9* 3.1* 2.8*           Troponin:  Recent Labs   Lab 03/10/25  5683    Troponin I <0.006     BNP  Recent Labs   Lab 03/10/25  1736 04/11/25  1841   BNP 11 74       Assessment & Plan    I30.0 Recurrent idiopathic pericarditis  I31.39 Pericardial effusion  J90 Pleural effusion    RECURRENT IDIOPATHIC PERICARDITIS:  - Diagnosed with recurrent pericarditis.  - Initial treatment with ibuprofen and colchicine insufficient, leading to hospital admission for pericardiocentesis.  - Current physical exam favorable, not consistent with concerning pericardial effusion.  - Inflammatory markers elevated.  - Explained that current diagnosis is recurrent pericarditis, with treatment focused on symptom improvement.  - plan to continue prednisone 40 mg daily for 4 weeks with education on potential side effects including difficulty sleeping, mood swings, and increased appetite.  - Continued colchicine 0.6 mg twice daily for 12 weeks.  - Started Bactrim 3 days a week as prophylaxis while on prednisone, to begin after 2 weeks of steroid therapy.  - completed azithromycin course for pulmonary infection.  - Ordered XR Chest for May 6th.  - Ordered Echocardiogram for May 6th.  - Follow up in 4 weeks to consider steroid taper at that time.  - Plan of care discussed and coordinated with CCU attending Dr. Kimbrough.   - Contact the office if there's significant progression or worsening of symptoms.    PERICARDIAL EFFUSION:  - Current physical exam favorable, not concerning for pericardial effusion.  - Ordered Echocardiogram for May 6th.    PLEURAL EFFUSION:  - Ordered XR Chest for May 6th.  - seems to have resolved on physical exam    LIFESTYLE CHANGES:  - Patient can return to work when feeling capable, starting with light duties as tolerated.         This note was generated with the assistance of ambient listening technology. Verbal consent was obtained by the patient and accompanying visitor(s) for the recording of patient appointment to facilitate this note. I attest to having reviewed and edited the  generated note for accuracy, though some syntax or spelling errors may persist. Please contact the author of this note for any clarification.

## 2025-05-06 ENCOUNTER — HOSPITAL ENCOUNTER (OUTPATIENT)
Dept: CARDIOLOGY | Facility: HOSPITAL | Age: 25
Discharge: HOME | End: 2025-05-06
Attending: INTERNAL MEDICINE
Payer: COMMERCIAL

## 2025-05-06 ENCOUNTER — HOSPITAL ENCOUNTER (OUTPATIENT)
Dept: RADIOLOGY | Facility: HOSPITAL | Age: 25
Discharge: HOME | End: 2025-05-06
Attending: INTERNAL MEDICINE
Payer: COMMERCIAL

## 2025-05-06 VITALS
WEIGHT: 167 LBS | SYSTOLIC BLOOD PRESSURE: 118 MMHG | HEART RATE: 72 BPM | BODY MASS INDEX: 20.76 KG/M2 | HEIGHT: 75 IN | DIASTOLIC BLOOD PRESSURE: 70 MMHG

## 2025-05-06 DIAGNOSIS — I31.9 PERICARDITIS: ICD-10-CM

## 2025-05-06 DIAGNOSIS — I30.0 RECURRENT IDIOPATHIC PERICARDITIS: ICD-10-CM

## 2025-05-06 LAB
ASCENDING AORTA: 2.7 CM
AV AREA BY CONTINUOUS VTI: 2.7 CM2
AV INDEX (PROSTH): 0.83
AV LVOT MEAN GRADIENT: 4 MMHG
AV LVOT PEAK GRADIENT: 7 MMHG
AV MEAN GRADIENT: 4 MMHG
AV PEAK GRADIENT: 8 MMHG
AV VALVE AREA BY VELOCITY RATIO: 2.9 CM²
AV VALVE AREA: 2.6 CM2
AV VELOCITY RATIO: 0.93
BSA FOR ECHO PROCEDURE: 2 M2
CV ECHO LV RWT: 0.34 CM
DOP CALC AO PEAK VEL: 1.4 M/S
DOP CALC AO VTI: 28.2 CM
DOP CALC LVOT AREA: 3.1 CM2
DOP CALC LVOT DIAMETER: 2 CM
DOP CALC LVOT PEAK VEL: 1.3 M/S
DOP CALC LVOT STROKE VOLUME: 73.2 CM3
DOP CALC RVOT AREA: 2.66 CM2
DOP CALC RVOT DIAMETER: 1.84 CM
DOP CALCLVOT PEAK VEL VTI: 23.3 CM
E WAVE DECELERATION TIME: 182 MS
E/A RATIO: 1.39
E/E' RATIO: 6 M/S
ECHO EF ESTIMATED: 65 %
ECHO LV POSTERIOR WALL: 0.8 CM (ref 0.6–1.1)
FRACTIONAL SHORTENING: 36.2 % (ref 28–44)
HR MV ECHO: 72 BPM
INTERVENTRICULAR SEPTUM: 0.8 CM (ref 0.6–1.1)
IVC DIAMETER: 1.78 CM
LA MAJOR: 4.5 CM
LA MINOR: 4.2 CM
LA WIDTH: 3.2 CM
LEFT ATRIUM SIZE: 3.7 CM
LEFT ATRIUM VOLUME INDEX MOD: 20 ML/M2
LEFT ATRIUM VOLUME INDEX: 22 ML/M2
LEFT ATRIUM VOLUME MOD: 40 ML
LEFT ATRIUM VOLUME: 44 CM3
LEFT INTERNAL DIMENSION IN SYSTOLE: 3 CM (ref 2.1–4)
LEFT VENTRICLE DIASTOLIC VOLUME INDEX: 49.26 ML/M2
LEFT VENTRICLE DIASTOLIC VOLUME: 100 ML
LEFT VENTRICLE MASS INDEX: 60.2 G/M2
LEFT VENTRICLE SYSTOLIC VOLUME INDEX: 17.2 ML/M2
LEFT VENTRICLE SYSTOLIC VOLUME: 35 ML
LEFT VENTRICULAR INTERNAL DIMENSION IN DIASTOLE: 4.7 CM (ref 3.5–6)
LEFT VENTRICULAR MASS: 122.3 G
LV LATERAL E/E' RATIO: 4.5
LV SEPTAL E/E' RATIO: 7.7
MV A" WAVE DURATION": 108.47 MS
MV MEAN GRADIENT: 2 MMHG
MV PEAK A VEL: 0.61 M/S
MV PEAK E VEL: 0.85 M/S
MV PEAK GRADIENT: 4 MMHG
OHS CV RV/LV RATIO: 0.66 CM
PISA TR MAX VEL: 2.9 M/S
PULM VEIN A" WAVE DURATION": 108.47 MS
PULM VEIN S/D RATIO: 1.16
PULMONIC VEIN PEAK A VELOCITY: 0.2 M/S
PV PEAK D VEL: 0.45 M/S
PV PEAK S VEL: 0.52 M/S
RA MAJOR: 4.15 CM
RA PRESSURE ESTIMATED: 3 MMHG
RA WIDTH: 3.36 CM
RIGHT ATRIAL AREA: 14.1 CM2
RIGHT VENTRICLE DIASTOLIC BASEL DIMENSION: 3.1 CM
RV TB RVSP: 6 MMHG
RV TISSUE DOPPLER FREE WALL SYSTOLIC VELOCITY 1 (APICAL 4 CHAMBER VIEW): 14.03 CM/S
SINUS: 2.86 CM
STJ: 2.4 CM
TDI LATERAL: 0.19 M/S
TDI SEPTAL: 0.11 M/S
TDI: 0.15 M/S
TRICUSPID ANNULAR PLANE SYSTOLIC EXCURSION: 1.9 CM
TV PEAK GRADIENT: 33 MMHG
TV REST PULMONARY ARTERY PRESSURE: 37 MMHG
Z-SCORE OF LEFT VENTRICULAR DIMENSION IN END DIASTOLE: -2.47
Z-SCORE OF LEFT VENTRICULAR DIMENSION IN END SYSTOLE: -1.63

## 2025-05-06 PROCEDURE — 93306 TTE W/DOPPLER COMPLETE: CPT

## 2025-05-06 PROCEDURE — 93306 TTE W/DOPPLER COMPLETE: CPT | Mod: 26,,, | Performed by: INTERNAL MEDICINE

## 2025-05-06 PROCEDURE — 71046 X-RAY EXAM CHEST 2 VIEWS: CPT | Mod: TC

## 2025-05-06 PROCEDURE — 71046 X-RAY EXAM CHEST 2 VIEWS: CPT | Mod: 26,,, | Performed by: RADIOLOGY

## 2025-05-20 ENCOUNTER — OFFICE VISIT (OUTPATIENT)
Dept: CARDIOLOGY | Facility: CLINIC | Age: 25
End: 2025-05-20
Payer: COMMERCIAL

## 2025-05-20 DIAGNOSIS — I30.0 IDIOPATHIC PERICARDITIS, UNSPECIFIED CHRONICITY: ICD-10-CM

## 2025-05-20 RX ORDER — PREDNISONE 5 MG/1
TABLET ORAL
Qty: 49 TABLET | Refills: 0 | Status: SHIPPED | OUTPATIENT
Start: 2025-05-20 | End: 2025-06-10

## 2025-05-20 RX ORDER — COLCHICINE 0.6 MG/1
0.6 TABLET ORAL 2 TIMES DAILY
Qty: 180 TABLET | Refills: 3 | Status: SHIPPED | OUTPATIENT
Start: 2025-05-20 | End: 2026-05-20

## 2025-05-20 NOTE — PROGRESS NOTES
HISTORY:    24-year-old male with a history of pericarditis presenting for follow up.      Patient had an initial diagnosis of pericarditis in March 2025 with classic symptoms.  He was treated with ibuprofen and colchicine, although he was not compliant with these meds initially.  In April 2025 he had a progression of symptoms and was diagnosed with a large pericardial effusion and tamponade requiring admission and pericardiocentesis.  He was restarted on colchicine as well as prednisone at that time for possible recurrent pericarditis.  His hospital admission was complicated by development of pleural effusion and pulmonary infection.  He was evaluated by Rheumatology inpatient due to a strong family history of autoimmune disease, mother has a history of autoimmune disease and recurrent pericarditis.    Post discharge he has done well.  Follow-up imaging and labs have been normal.  The patient currently reports feeling much improved on his current regimen.  He has minimal chest symptoms if at all.  He is back to regular activity levels.    Currently tolerates colchicine 0.6 x 2, prednisone 40 x 1, Bactrim 1 tablet 3 times weekly, pantoprazole 40 mg daily.    PHYSICAL EXAM:    NAD, A+Ox3.  Breathing comfortably.    LABS/STUDIES (imaging reviewed during clinic visit):    May 2025 CBC and CMP normal.  CRP normal <- 172 and April.  TTE May 2025 normal LV size and function with EF 60 65%.  Normal diastology.  CVP 3.  Chest x-ray May 2025 normal cardiac silhouette with no infiltrates or effusions.    ASSESSMENT & PLAN:    1. Idiopathic pericarditis, unspecified chronicity        Orders Placed This Encounter    X-Ray Chest PA And Lateral    CBC Without Differential    Comprehensive Metabolic Panel    CRP, High Sensitivity    Echo    predniSONE (DELTASONE) 5 MG tablet    colchicine (COLCRYS) 0.6 mg tablet      Patient being treated for recurrent pericarditis with prednisone and colchicine.  Symptoms much improved  with objective markers that are favorable.  We will start prednisone taper to 20 mg daily for 1 week followed by 10 mg daily for 1 week followed by 5 mg daily for 1 week.  Okay to stop Bactrim once dose decreases 10 mg daily.  Okay to stop pantoprazole once taper completed.  Continue colchicine 0.6 x 2.    Check chest x-ray, echocardiogram, and labs in 2 months.    Follow up in about 3 months (around 8/20/2025).      Jonathon Kelly MD    This visit was a tele visit:  Video.    The patient was in the [facility] during this visit.    The practitioner was in the [facility] during this visit.

## 2025-06-25 ENCOUNTER — RESULTS FOLLOW-UP (OUTPATIENT)
Dept: CARDIOLOGY | Facility: HOSPITAL | Age: 25
End: 2025-06-25

## 2025-07-08 ENCOUNTER — PATIENT MESSAGE (OUTPATIENT)
Dept: CARDIOLOGY | Facility: CLINIC | Age: 25
End: 2025-07-08
Payer: COMMERCIAL

## 2025-07-08 NOTE — TELEPHONE ENCOUNTER
Patient missed echocardiogram and chest x-ray scheduled for yesterday.  Concerned about cost.  He is doing well off prednisone at this point in time.  He has a rheumatology appointment tomorrow.  He has a CBC, CMP, and CRP scheduled.  Okay to wait to draw those labs until after he sees rheumatology.  Okay to defer echocardiogram and chest x-ray due to cost issues at this time.  Remains on colchicine 0.6 x 2.  Plan to treat with colchicine for 6 months and then stop.  The patient will message me in a few months and if feeling well, we can proceed with discontinuation of colchicine without further imaging.

## 2025-07-09 ENCOUNTER — OFFICE VISIT (OUTPATIENT)
Dept: RHEUMATOLOGY | Facility: CLINIC | Age: 25
End: 2025-07-09
Payer: COMMERCIAL

## 2025-07-09 ENCOUNTER — LAB VISIT (OUTPATIENT)
Dept: LAB | Facility: HOSPITAL | Age: 25
End: 2025-07-09
Attending: INTERNAL MEDICINE
Payer: COMMERCIAL

## 2025-07-09 VITALS
HEART RATE: 88 BPM | BODY MASS INDEX: 20.94 KG/M2 | TEMPERATURE: 98 F | HEIGHT: 75 IN | SYSTOLIC BLOOD PRESSURE: 122 MMHG | WEIGHT: 168.44 LBS | DIASTOLIC BLOOD PRESSURE: 87 MMHG

## 2025-07-09 DIAGNOSIS — Z71.89 COUNSELING AND COORDINATION OF CARE: ICD-10-CM

## 2025-07-09 DIAGNOSIS — I30.0 IDIOPATHIC PERICARDITIS, UNSPECIFIED CHRONICITY: ICD-10-CM

## 2025-07-09 DIAGNOSIS — J84.9 INTERSTITIAL PULMONARY DISEASE, UNSPECIFIED: ICD-10-CM

## 2025-07-09 DIAGNOSIS — I30.9 ACUTE PERICARDITIS, UNSPECIFIED TYPE: Primary | ICD-10-CM

## 2025-07-09 DIAGNOSIS — I31.39 PERICARDIAL EFFUSION: ICD-10-CM

## 2025-07-09 LAB
ALBUMIN SERPL BCP-MCNC: 3.9 G/DL (ref 3.5–5.2)
ALP SERPL-CCNC: 76 UNIT/L (ref 40–150)
ALT SERPL W/O P-5'-P-CCNC: 12 UNIT/L (ref 10–44)
ANION GAP (OHS): 10 MMOL/L (ref 8–16)
AST SERPL-CCNC: 14 UNIT/L (ref 11–45)
BILIRUB SERPL-MCNC: 0.3 MG/DL (ref 0.1–1)
BUN SERPL-MCNC: 13 MG/DL (ref 6–20)
CALCIUM SERPL-MCNC: 9.7 MG/DL (ref 8.7–10.5)
CHLORIDE SERPL-SCNC: 106 MMOL/L (ref 95–110)
CO2 SERPL-SCNC: 25 MMOL/L (ref 23–29)
CREAT SERPL-MCNC: 0.9 MG/DL (ref 0.5–1.4)
CRP SERPL-MCNC: 19.01 MG/L
ERYTHROCYTE [DISTWIDTH] IN BLOOD BY AUTOMATED COUNT: 13.9 % (ref 11.5–14.5)
GFR SERPLBLD CREATININE-BSD FMLA CKD-EPI: >60 ML/MIN/1.73/M2
GLUCOSE SERPL-MCNC: 103 MG/DL (ref 70–110)
HCT VFR BLD AUTO: 41.7 % (ref 40–54)
HGB BLD-MCNC: 13.6 GM/DL (ref 14–18)
MCH RBC QN AUTO: 28.6 PG (ref 27–31)
MCHC RBC AUTO-ENTMCNC: 32.6 G/DL (ref 32–36)
MCV RBC AUTO: 88 FL (ref 82–98)
PLATELET # BLD AUTO: 335 K/UL (ref 150–450)
PMV BLD AUTO: 11.3 FL (ref 9.2–12.9)
POTASSIUM SERPL-SCNC: 3.6 MMOL/L (ref 3.5–5.1)
PROT SERPL-MCNC: 7.9 GM/DL (ref 6–8.4)
RBC # BLD AUTO: 4.76 M/UL (ref 4.6–6.2)
SODIUM SERPL-SCNC: 141 MMOL/L (ref 136–145)
WBC # BLD AUTO: 12.36 K/UL (ref 3.9–12.7)

## 2025-07-09 PROCEDURE — 99215 OFFICE O/P EST HI 40 MIN: CPT | Mod: S$GLB,,, | Performed by: STUDENT IN AN ORGANIZED HEALTH CARE EDUCATION/TRAINING PROGRAM

## 2025-07-09 PROCEDURE — 3079F DIAST BP 80-89 MM HG: CPT | Mod: CPTII,S$GLB,, | Performed by: STUDENT IN AN ORGANIZED HEALTH CARE EDUCATION/TRAINING PROGRAM

## 2025-07-09 PROCEDURE — 99999 PR PBB SHADOW E&M-EST. PATIENT-LVL III: CPT | Mod: PBBFAC,,, | Performed by: STUDENT IN AN ORGANIZED HEALTH CARE EDUCATION/TRAINING PROGRAM

## 2025-07-09 PROCEDURE — 85027 COMPLETE CBC AUTOMATED: CPT

## 2025-07-09 PROCEDURE — 86141 C-REACTIVE PROTEIN HS: CPT

## 2025-07-09 PROCEDURE — 3074F SYST BP LT 130 MM HG: CPT | Mod: CPTII,S$GLB,, | Performed by: STUDENT IN AN ORGANIZED HEALTH CARE EDUCATION/TRAINING PROGRAM

## 2025-07-09 PROCEDURE — G2211 COMPLEX E/M VISIT ADD ON: HCPCS | Mod: S$GLB,,, | Performed by: STUDENT IN AN ORGANIZED HEALTH CARE EDUCATION/TRAINING PROGRAM

## 2025-07-09 PROCEDURE — 1159F MED LIST DOCD IN RCRD: CPT | Mod: CPTII,S$GLB,, | Performed by: STUDENT IN AN ORGANIZED HEALTH CARE EDUCATION/TRAINING PROGRAM

## 2025-07-09 PROCEDURE — 36415 COLL VENOUS BLD VENIPUNCTURE: CPT

## 2025-07-09 PROCEDURE — 80053 COMPREHEN METABOLIC PANEL: CPT

## 2025-07-09 PROCEDURE — 3008F BODY MASS INDEX DOCD: CPT | Mod: CPTII,S$GLB,, | Performed by: STUDENT IN AN ORGANIZED HEALTH CARE EDUCATION/TRAINING PROGRAM

## 2025-07-09 NOTE — PROGRESS NOTES
RHEUMATOLOGY OUTPATIENT CLINIC NOTE    7/13/2025    Attending Rheumatologist: Ricarda Callahan  Primary Care Provider: Fracisco Phipps MD   Physician Requesting Consultation: Arianne Chan MD  [address] [place] [address]  Chief Complaint/Reason For Consultation:  Establish Care (Pericardial effusion)      Subjective:       HPI  Shekhar Snyder is a 24 y.o. White male with pmhx noted below referred for recurrent pericarditis.     Patient initially diagnosed with pericarditis in 3/2025 with chest pain that was constant worsening with breathing and position. He reports it happened after Flu.  Initially treated with Colchicine and Colchicine(per records not as complaint with this regimen)   Then 4/2025 his symptoms worsened and were complicated with a large pericardial effusion  and tamponade that required pericardiocentesis and was admitted to the hospital.  Later complicated with pleural effusion and PNA.   Seen by rheumatology at that time and no evidence of CTD   After discharge he has bene doing well but continues to have MCCALLUM and chest discomfort when tries to do too much. Now off PDN and Bactrim- only on Colchicine BID      Currently tolerates colchicine 0.6 x 2, prednisone 40 x 1, Bactrim 1 tablet 3 times weekly, pantoprazole 40 mg daily.    Review of Systems   All other systems reviewed and are negative.       Chronic comorbid conditions affecting medical decision making today:  Past Medical History:   Diagnosis Date    ADHD (attention deficit hyperactivity disorder)     Depression     PTSD (post-traumatic stress disorder)      Past Surgical History:   Procedure Laterality Date    ADENOIDECTOMY      BRONCHOSCOPY N/A 4/17/2025    Procedure: Bronchoscopy;  Surgeon: Elroy Dow MD;  Location: [facility] OR [address] [place] [address];  Service: Pulmonary;  Laterality: N/A;    CIRCUMCISION      OPEN REDUCTION AND INTERNAL FIXATION (ORIF) OF FRACTURE OF CLAVICLE Left 9/20/2022    Procedure: ORIF,  FRACTURE, CLAVICLE;  Surgeon: Satya Reynoso IV, MD;  Location: [facility] OR;  Service: Orthopedics;  Laterality: Left;  Beach chair, padded darling stand (no arm krishnamurthy needed), Large C arm come in from head of bed; acumed clavicle system  dian notified 9/19 @ [facility]    PERICARDIOCENTESIS N/A 4/11/2025    Procedure: Pericardiocentesis;  Surgeon: Ismael Laguna Jr., MD;  Location: [facility] CATH LAB;  Service: Cardiology;  Laterality: N/A;    TONSILLECTOMY      TYMPANOSTOMY TUBE PLACEMENT       Family History   Problem Relation Name Age of Onset    Oswald Vindo sequence Brother Shekhar morillo     Asthma Brother Shekhar morillo     Bipolar disorder Maternal Uncle      Alcohol abuse Father      Drug abuse Father      Personality disorder Father      ADD / ADHD Mother      Depression Mother      Physical abuse Paternal Grandmother       Social History     Substance and Sexual Activity   Alcohol Use No     Tobacco Use History[1]  Social History     Substance and Sexual Activity   Drug Use Yes    Types: Marijuana     Current Medications[2]     Objective:         Vitals:    07/09/25 0841   BP: 122/87   Pulse: 88   Temp: 98.3 °F (36.8 °C)     Physical Exam   Constitutional: normal appearance.   HENT:   Head: Normocephalic.   Nose: Nose normal.   Mouth/Throat: Mucous membranes are moist.   Eyes: Conjunctivae are normal.   Cardiovascular: Normal rate and regular rhythm.   Pulmonary/Chest: Effort normal and breath sounds normal.   Musculoskeletal:         General: Normal range of motion.      Cervical back: Normal range of motion.   Neurological: He is alert.   Psychiatric: Mood normal.       Reviewed old and all outside pertinent medical records available.    All lab results personally reviewed and interpreted by me.  Lab Results   Component Value Date    WBC 12.36 07/09/2025    HGB 13.6 (L) 07/09/2025    HCT 41.7 07/09/2025    MCV 88 07/09/2025    MCH 28.6 07/09/2025    MCHC 32.6 07/09/2025    RDW 13.9 07/09/2025      "07/09/2025    MPV 11.3 07/09/2025       Lab Results   Component Value Date     07/09/2025    K 3.6 07/09/2025     07/09/2025    CO2 25 07/09/2025     07/09/2025    BUN 13 07/09/2025    CALCIUM 9.7 07/09/2025    PROT 7.9 07/09/2025    ALBUMIN 3.9 07/09/2025    BILITOT 0.3 07/09/2025    AST 14 07/09/2025    ALKPHOS 76 07/09/2025    ALT 12 07/09/2025       Lab Results   Component Value Date    COLORU Yellow 04/17/2025    APPEARANCEUA Clear 04/17/2025    SPECGRAV 1.020 04/17/2025    PHUR 6.0 04/17/2025    PROTEINUA Negative 04/17/2025    KETONESU Negative 04/01/2014    LEUKOCYTESUR Negative 04/17/2025    NITRITE Negative 04/17/2025    UROBILINOGEN Negative 04/17/2025       Lab Results   Component Value Date    .0 (H) 04/11/2025       Lab Results   Component Value Date    SEDRATE 27 (H) 04/18/2025       Lab Results   Component Value Date    RF <13.0 04/16/2025    SEDRATE 27 (H) 04/18/2025       No components found for: "25OHVITDTOT", "63MXHYAH6", "92APEQQC2", "METHODNOTE"    No results found for: "URICACID"    No components found for: "TSPOTTB"    No results found for: "DOM"     Imaging:  All imaging reviewed and independently interpreted by me.         ASSESSMENT / PLAN:     Shekhar Snyder is a 24 y.o. White male with:      1. Pericardial effusion  Plan will be to do 8 more weeks of colchicine and then start weaning off- if patient continues to have symptoms or has a flare up he will need immunosuppression- Rilonacept- gave patient information pamphlet   - Ambulatory referral/consult to Rheumatology  - Anti-Histone Antibody; Future  - MyoMarker Panel 3; Future  - Myeloperoxidase Antibody (MPO); Future  - Cryoglobulin; Future  - C3 Complement; Future  - C4 Complement; Future  - Cardiolipin antibody; Future  - Beta-2 Glycoprotein Abs (IgA, IgG, IgM); Future  - Anti-Scleroderma Antibody; Future  - Anti-Centromere Antibody; Future  - HLA B27 Antigen; Future  - Sedimentation rate; Future  - " C-Reactive Protein; Future    2. Pericarditis (Primary)  - Ambulatory referral/consult to Rheumatology  - Anti-Histone Antibody; Future  - MyoMarker Panel 3; Future  - Myeloperoxidase Antibody (MPO); Future  - Cryoglobulin; Future  - C3 Complement; Future  - C4 Complement; Future  - Cardiolipin antibody; Future  - Beta-2 Glycoprotein Abs (IgA, IgG, IgM); Future  - Anti-Scleroderma Antibody; Future  - Anti-Centromere Antibody; Future  - HLA B27 Antigen; Future  - Sedimentation rate; Future  - C-Reactive Protein; Future    3. Interstitial pulmonary disease, unspecified  - CT Chest Without Contrast; Future    4. Other specified counseling  - over 10 minutes spent regarding below topics:  - Immunization counseling done.  - Weight loss counseling done.  - Nutrition and exercise counseling.  - Limitation of alcohol consumption.  - Regular exercise:  Aerobic and resistance.  - Medication counseling provided.      Follow up in about 8 weeks (around 9/3/2025).    Method of contact with patient concerns: [facility]    Disclaimer:  This note is prepared using voice recognition software and as such is likely to have errors and has not been proof read. Please contact me for questions.     Time spent: 60 minutes in face to face discussion concerning diagnosis, prognosis, review of lab and test results, benefits of treatment as well as management of disease, counseling of patient and coordination of care between various health care providers.  Greater than half the time spent was used for coordination of care and counseling of patient.    Ricarda Callahan M.D.  Rheumatology Department   [facility]     Today's visit is associated with current or anticipated ongoing medical care related to this patient's diagnosis of pericarditis which is a chronic disease which will require regular follow up to manage symptoms and progression. The patient is to return to the office within a minimum of 3-6 months to  "review symptoms and function at that time. CPT code          [1]   Social History  Tobacco Use   Smoking Status Every Day    Current packs/day: 0.50    Average packs/day: 0.5 packs/day for 5.5 years (2.8 ttl pk-yrs)    Types: Cigarettes, Vaping with nicotine    Start date: 2020    Passive exposure: Never   Smokeless Tobacco Never   Tobacco Comments    Pt states that he started smoking cigarettes and vaping at age 19, but then quit using nicotine in any form 1 month ago when he started feeling ill.  EMR to be updated to reflect "Former Smoker" status when pt remains without relapse as of 2/12//2026.   [2]   Current Outpatient Medications:     colchicine (COLCRYS) 0.6 mg tablet, Take 1 tablet (0.6 mg total) by mouth 2 (two) times daily., Disp: 180 tablet, Rfl: 3    albuterol (VENTOLIN HFA) 90 mcg/actuation inhaler, Inhale 2 puffs into the lungs every 6 (six) hours as needed for Wheezing. Rescue (Patient not taking: Reported on 7/9/2025), Disp: , Rfl:     azithromycin (Z-GAYLE) 250 MG tablet, Take 1 tablet (250 mg total) by mouth once daily. (Patient not taking: Reported on 7/9/2025), Disp: 1 tablet, Rfl: 0    sulfamethoxazole-trimethoprim 800-160mg (BACTRIM DS) 800-160 mg Tab, Take 1 tablet by mouth every Mon, Wed, Fri. Continue for as long as you are on doses of prednisone greater than 20 mg daily (Patient not taking: Reported on 7/9/2025), Disp: 30 tablet, Rfl: 0    "

## 2025-08-20 ENCOUNTER — PATIENT MESSAGE (OUTPATIENT)
Dept: CARDIOLOGY | Facility: CLINIC | Age: 25
End: 2025-08-20
Payer: COMMERCIAL

## 2025-09-03 ENCOUNTER — HOSPITAL ENCOUNTER (OUTPATIENT)
Dept: RADIOLOGY | Facility: HOSPITAL | Age: 25
Discharge: HOME | End: 2025-09-03
Attending: STUDENT IN AN ORGANIZED HEALTH CARE EDUCATION/TRAINING PROGRAM
Payer: COMMERCIAL

## 2025-09-03 DIAGNOSIS — I30.9 ACUTE PERICARDITIS, UNSPECIFIED TYPE: ICD-10-CM

## 2025-09-03 DIAGNOSIS — J84.9 INTERSTITIAL PULMONARY DISEASE, UNSPECIFIED: ICD-10-CM

## 2025-09-03 PROCEDURE — 71250 CT THORAX DX C-: CPT | Mod: TC

## 2025-09-03 PROCEDURE — 71250 CT THORAX DX C-: CPT | Mod: 26,,, | Performed by: RADIOLOGY

## (undated) DEVICE — ELECTRODE REM PLYHSV RETURN 9

## (undated) DEVICE — APPLICATOR CHLORAPREP ORN 26ML

## (undated) DEVICE — PACK ECLIPSE SET-UP W/O DRAPE

## (undated) DEVICE — BLADE SURG CARBON STEEL #10

## (undated) DEVICE — GOWN POLY REINF BRTH SLV XL

## (undated) DEVICE — KIT CUSTOM WASTE BAG

## (undated) DEVICE — KIT MICROINTRO 4F .018X40X7CM

## (undated) DEVICE — BNDG COFLEX FOAM LF2 ST 4X5YD

## (undated) DEVICE — DRILL QUICK RELEASE 2.8MM 5IN

## (undated) DEVICE — TRAY CATH LAB OMC

## (undated) DEVICE — DRAPE INCISE IOBAN 2 23X23IN

## (undated) DEVICE — PACK SURGERY START

## (undated) DEVICE — BOWL STERILE LARGE 32OZ

## (undated) DEVICE — PAD ABDOMINAL 5X9 STERILE

## (undated) DEVICE — SUT ETHIBOND XTRA 1 CTX

## (undated) DEVICE — SPONGE LAP 18X18 PREWASHED

## (undated) DEVICE — DRESSING XEROFORM FOIL PK 1X8

## (undated) DEVICE — DRAPE THREE-QTR REINF 53X77IN

## (undated) DEVICE — DRESSING AQUACEL AG ADV 3.5X6

## (undated) DEVICE — DRAPE PED LAP SURG 108X77IN

## (undated) DEVICE — GAUZE AVANT SPNG 4PLY STRL 4X4

## (undated) DEVICE — ALCOHOL 70% ISOP W/GREEN 16OZ

## (undated) DEVICE — GOWN POLY REINF X-LONG 2XL

## (undated) DEVICE — SYR 10CC LUER LOCK

## (undated) DEVICE — NDL HYPO REG 25G X 1 1/2

## (undated) DEVICE — GAUZE SPONGE 4X4 12PLY

## (undated) DEVICE — GLOVE BIOGEL PIMICRO INDIC 8.5

## (undated) DEVICE — SYR SLIP TIP 20CC

## (undated) DEVICE — CLOSURE SKIN STERI STRIP 1/4X4

## (undated) DEVICE — MANIFOLD 4 PORT

## (undated) DEVICE — PACK OPTIMA GEN ORTHO

## (undated) DEVICE — SYR 30CC LUER LOCK

## (undated) DEVICE — SUT MONOCRYL 3-0 PS-2 UND

## (undated) DEVICE — SPONGE COTTON TRAY 4X4IN

## (undated) DEVICE — TUBING SUC UNIV W/CONN 12FT

## (undated) DEVICE — SUT CTD VICRYL 2.0

## (undated) DEVICE — GOWN POLY REINF BRTH SLV LG

## (undated) DEVICE — DRAPE C-ARM/MOBILE XRAY 44X80

## (undated) DEVICE — COVER PROXIMA MAYO STAND

## (undated) DEVICE — NDL 22GA X1 1/2 REG BEVEL

## (undated) DEVICE — CONNECTOR DBL. MALE LUER LOCK

## (undated) DEVICE — DRAPE TIBURON ORTHOPEDIC SPLIT

## (undated) DEVICE — INSTRUMENT SUCTION FRAZIER 12F

## (undated) DEVICE — COVER OVERHEAD SURG LT BLUE

## (undated) DEVICE — DRAPE U SPLIT SHEET 54X76IN

## (undated) DEVICE — PENCIL GOLF STERILE

## (undated) DEVICE — SUT 2/0 36IN COATED VICRYL

## (undated) DEVICE — ADHESIVE DERMABOND ADVANCED

## (undated) DEVICE — NDL ASPIRATING VIZISHOT 20-40M

## (undated) DEVICE — SNAP CAP 18 DOME COVERS

## (undated) DEVICE — NDL FLTR 5MCRN BLNT TIP 18GX1

## (undated) DEVICE — SUT CTD VICRYL CT-1 27

## (undated) DEVICE — ADHESIVE MASTISOL VIAL 48/BX

## (undated) DEVICE — SUT MONOCRYL 3-0 PS-1

## (undated) DEVICE — SLING ARM COMFT NAVY BLU MED

## (undated) DEVICE — COVER PROBE US 5.5X58L NON LTX

## (undated) DEVICE — KIT ANTIFOG W/SPONG & FLUID

## (undated) DEVICE — DRAPE STERI INSTRUMENT 1018

## (undated) DEVICE — TOWEL OR DISP STRL BLUE 4/PK

## (undated) DEVICE — KIT PERIVAC W/ STR CATH 8.3FR

## (undated) DEVICE — LUBRICANT SURGILUBE 2 OZ

## (undated) DEVICE — GLOVE BIOGEL SKINSENSE PI 8.5

## (undated) DEVICE — CONTAINER SPECIMEN OR STER 4OZ

## (undated) DEVICE — ADAPTER SWIVEL

## (undated) DEVICE — DRESSING AQUACEL AG 3.5X10IN

## (undated) DEVICE — SYS LABEL CORRECT MED